# Patient Record
Sex: FEMALE | Race: WHITE | NOT HISPANIC OR LATINO | Employment: OTHER | URBAN - METROPOLITAN AREA
[De-identification: names, ages, dates, MRNs, and addresses within clinical notes are randomized per-mention and may not be internally consistent; named-entity substitution may affect disease eponyms.]

---

## 2019-06-09 ENCOUNTER — OFFICE VISIT (OUTPATIENT)
Dept: URGENT CARE | Facility: CLINIC | Age: 69
End: 2019-06-09
Payer: COMMERCIAL

## 2019-06-09 VITALS
SYSTOLIC BLOOD PRESSURE: 130 MMHG | RESPIRATION RATE: 16 BRPM | OXYGEN SATURATION: 100 % | DIASTOLIC BLOOD PRESSURE: 64 MMHG | TEMPERATURE: 99.3 F | HEART RATE: 84 BPM | BODY MASS INDEX: 31.18 KG/M2 | HEIGHT: 66 IN | WEIGHT: 194 LBS

## 2019-06-09 DIAGNOSIS — J06.9 VIRAL URI WITH COUGH: Primary | ICD-10-CM

## 2019-06-09 PROCEDURE — 99203 OFFICE O/P NEW LOW 30 MIN: CPT | Performed by: NURSE PRACTITIONER

## 2019-06-09 RX ORDER — SIMVASTATIN 10 MG
10 TABLET ORAL DAILY
Refills: 0 | COMMUNITY
Start: 2019-05-11 | End: 2021-08-24

## 2019-06-09 RX ORDER — METFORMIN HYDROCHLORIDE 500 MG/1
1000 TABLET, EXTENDED RELEASE ORAL 2 TIMES DAILY
Refills: 0 | COMMUNITY
Start: 2019-06-02

## 2021-04-13 ENCOUNTER — EVALUATION (OUTPATIENT)
Dept: PHYSICAL THERAPY | Facility: CLINIC | Age: 71
End: 2021-04-13
Payer: MEDICARE

## 2021-04-13 ENCOUNTER — TRANSCRIBE ORDERS (OUTPATIENT)
Dept: PHYSICAL THERAPY | Facility: CLINIC | Age: 71
End: 2021-04-13

## 2021-04-13 DIAGNOSIS — M25.552 LEFT HIP PAIN: ICD-10-CM

## 2021-04-13 DIAGNOSIS — M54.16 LUMBAR RADICULOPATHY: Primary | ICD-10-CM

## 2021-04-13 PROCEDURE — 97162 PT EVAL MOD COMPLEX 30 MIN: CPT | Performed by: PHYSICAL THERAPIST

## 2021-04-13 PROCEDURE — 97110 THERAPEUTIC EXERCISES: CPT | Performed by: PHYSICAL THERAPIST

## 2021-04-13 NOTE — LETTER
2021    Ifeoma Patel MD  [address] [place] [id number]    Patient: Trina Winters   YOB: 1950   Date of Visit: 2021     Encounter Diagnosis     ICD-10-CM    1  Lumbar radiculopathy  M54 16    2  Left hip pain  M25 552        Dear Dr Nelly Saez:    Thank you for your recent referral of Trina Winters  Please review the attached evaluation summary from [facility]'s recent visit  Please verify that you agree with the plan of care by signing the attached order  If you have any questions or concerns, please do not hesitate to call  I sincerely appreciate the opportunity to share in the care of one of your patients and hope to have another opportunity to work with you in the near future  Sincerely,    Celestine Lora, PT      Referring Provider:      I certify that I have read the below Plan of Care and certify the need for these services furnished under this plan of treatment while under my care  Ifeoma Patel MD  [address] [place] [id number]  Via Fax: [phone]          PT Evaluation     Today's date: 2021  Patient name: Trina Winters  : 1950  MRN: [id number]  Referring provider: Roz Arroyo MD  Dx:   Encounter Diagnosis     ICD-10-CM    1  Lumbar radiculopathy  M54 16    2  Left hip pain  M25 552                   Assessment  Assessment details: Trina Winters is a 72 y o  female who presents with pain, decreased strength, decreased ROM, decreased joint mobility and ambulatory dysfunction  Due to these impairments, patient has difficulty performing ADL's, prolonged standing, prolonged sitting, functional mobility  Patient's clinical presentation is consistent with early signs of left hip OA combined with lumbar stenosis  Patient has been educated in postural education, home exercise program and plan of care   Patient would benefit from skilled physical therapy services to address their aforementioned functional limitations and progress towards prior level of function and independence with home exercise program         Impairments: abnormal gait, abnormal or restricted ROM, activity intolerance, impaired physical strength, lacks appropriate home exercise program, pain with function, poor posture  and poor body mechanics  Understanding of Dx/Px/POC: excellent  Goals  Short term goals to be accomplished in 2-4weeks:  STG 1: Pt will demo I with HEP to maximize progress between therapy sessions  STG 2: Pt will demo 1/2 MMT grade core/hip strength to improve lumbar stability with functional challenges  STG 3: Pt will reports pain dec freq and intensity 50%    Long term goals to be accomplished in 4-8 weeks:  LTG 1: Pt will be able to negotiate stairs reciprocally >75% of time  LTG 2: Pt will be able to return to walking activity for >1hour pain free as per PLOF  LTG 3: Pt will demo improved SLS balance to >20 sec B  Plan  Plan details: HEP development, stretching, strengthening, A/AA/PROM, joint mobilizations, posture education, body mechanics training for bending and lifting, STM/MI as needed to reduce muscle tension, balance and proprioceptive training, muscle reeducation, PLOC discussed and agreed upon with patient  Patient would benefit from: skilled physical therapy  Planned modality interventions: cryotherapy and thermotherapy: hydrocollator packs  Planned therapy interventions: manual therapy, neuromuscular re-education, self care, therapeutic activities, therapeutic exercise, home exercise program, body mechanics training, patient education, postural training, strengthening and stretching  Frequency: 2x week  Duration in weeks: 6  Treatment plan discussed with: patient        Subjective Evaluation    History of Present Illness  Mechanism of injury: Miesha Champagne reports an onset of left low back and lateral hip pain that began in June 2020    She received an injection at that time by her physiatrist with some relief of pain  She has also been exercises and staying active since then but continues with symptoms in the region  She recently returned to her physiatrist who has referred her to Chuckie Arriaga prior to further injections  Marybel Moraes reports left low back, lateral hip and anterior groin pain that is intermittent  She states she feels stiffness and pain that increase with inactivity and improve with movement  She states pain and stiffness can cause her leg to feel weak which impacts her ability to negotiate stairs  Marybel Moraes reports "Limited a lot" with : moderate activities (moving a table, pushing a vacuum ), climbing several flights of stairs, "limiteed a little" with: walking several blocks, "moderate difficulty" with: performing heavy activities around home, usual hobbies, recreational activities performing usual work, housework  Pain  Current pain ratin  At best pain ratin  At worst pain ratin  Quality: dull ache (stiffness)    Social Support  Steps to enter house: yes  Stairs in house: yes   Lives in: multiple-level home  Lives with: significant other    Employment status: not working  Exercise history: Daily exercise - walking and pilates  Treatments  Previous treatment: injection treatment and physical therapy  Patient Goals  Patient goals for therapy: increased strength, return to sport/leisure activities, independence with ADLs/IADLs, improved balance and decreased pain          Objective     Postural Observations  Seated posture: fair  Standing posture: fair        Palpation   Left   Hypertonic in the quadratus lumborum  Tenderness of the gluteus marla, gluteus medius and quadratus lumborum  Tenderness     Left Hip   Tenderness in the greater trochanter       Active Range of Motion     Lumbar   Flexion:  WFL  Extension:  Restriction level: moderate  Left lateral flexion:  Restriction level: moderate  Right lateral flexion:  Restriction level: minimal  Left Hip   Abduction: 16 degrees with pain  External rotation (90/90): 32 degrees   Internal rotation (90/90): 23 degrees with pain    Right Hip   Abduction: 27 degrees   External rotation (90/90): 22 degrees   Internal rotation (90/90): 29 degrees     Strength/Myotome Testing     Left Hip   Planes of Motion   Flexion: 4  Extension: 3+  Abduction: 3+  External rotation: 4- (Pain)  Internal rotation: 5    Right Hip   Planes of Motion   Flexion: 4-  Extension: 3+  Abduction: 3+  External rotation: 4+  Internal rotation: 4+    Left Knee   Flexion: 4+  Extension: 5    Right Knee   Flexion: 4+  Extension: 5    Left Ankle/Foot   Dorsiflexion: 5    Right Ankle/Foot   Dorsiflexion: 5    Tests     Lumbar     Left   Positive crossed SLR  Negative passive SLR  Left Pelvic Girdle/Sacrum   Negative: active SLR test      Right Pelvic Girdle/Sacrum   Negative: active SLR test      Left Hip   Positive FADIR  Negative KATARZYNA    SLR: Positive  Knee extension: 42  Right Hip   SLR: Negative  Knee extension: 58  Ambulation     Ambulation: Stairs   Ascend stairs: independent  Pattern: non-reciprocal  Railings: one rail  Descend stairs: independent  Pattern: non-reciprocal  Railings: one rail    Observational Gait   Decreased walking speed and stride length  Additional Observational Gait Details  Patient ambulates with increased left hip external rotation and stiff lumbar spine      Functional Assessment        Single Leg Stance   Left: 10 seconds  Right: 5 seconds      Flowsheet Rows      Most Recent Value   PT/OT G-Codes   Current Score  52   Projected Score  63             Precautions: None  [facility]    Manuals 4/13                                       Neuro Re-Ed        Hook PPT 10x5s                                                       Ther Ex        Hook LS rot 15x       Hook knee fallouts 15x                                                       Ther Activity Gait Training                        Modalities

## 2021-04-13 NOTE — PROGRESS NOTES
PT Evaluation     Today's date: 2021  Patient name: Mahsa Villarreal  : 1950  MRN: [id number]  Referring provider: Myesha Gaviria MD  Dx:   Encounter Diagnosis     ICD-10-CM    1  Lumbar radiculopathy  M54 16    2  Left hip pain  M25 552                   Assessment  Assessment details: Mahsa Villarreal is a 72 y o  female who presents with pain, decreased strength, decreased ROM, decreased joint mobility and ambulatory dysfunction  Due to these impairments, patient has difficulty performing ADL's, prolonged standing, prolonged sitting, functional mobility  Patient's clinical presentation is consistent with early signs of left hip OA combined with lumbar stenosis  Patient has been educated in postural education, home exercise program and plan of care  Patient would benefit from skilled physical therapy services to address their aforementioned functional limitations and progress towards prior level of function and independence with home exercise program         Impairments: abnormal gait, abnormal or restricted ROM, activity intolerance, impaired physical strength, lacks appropriate home exercise program, pain with function, poor posture  and poor body mechanics  Understanding of Dx/Px/POC: excellent  Goals  Short term goals to be accomplished in 2-4weeks:  STG 1: Pt will demo I with HEP to maximize progress between therapy sessions  STG 2: Pt will demo 1/2 MMT grade core/hip strength to improve lumbar stability with functional challenges  STG 3: Pt will reports pain dec freq and intensity 50%    Long term goals to be accomplished in 4-8 weeks:  LTG 1: Pt will be able to negotiate stairs reciprocally >75% of time  LTG 2: Pt will be able to return to walking activity for >1hour pain free as per PLOF  LTG 3: Pt will demo improved SLS balance to >20 sec B      Plan  Plan details: HEP development, stretching, strengthening, A/AA/PROM, joint mobilizations, posture education, body mechanics training for bending and lifting, STM/MI as needed to reduce muscle tension, balance and proprioceptive training, muscle reeducation, PLOC discussed and agreed upon with patient  Patient would benefit from: skilled physical therapy  Planned modality interventions: cryotherapy and thermotherapy: hydrocollator packs  Planned therapy interventions: manual therapy, neuromuscular re-education, self care, therapeutic activities, therapeutic exercise, home exercise program, body mechanics training, patient education, postural training, strengthening and stretching  Frequency: 2x week  Duration in weeks: 6  Treatment plan discussed with: patient        Subjective Evaluation    History of Present Illness  Mechanism of injury: Mahsa Villarreal reports an onset of left low back and lateral hip pain that began in 2020  She received an injection at that time by her physiatrist with some relief of pain  She has also been exercises and staying active since then but continues with symptoms in the region  She recently returned to her physiatrist who has referred her to Chuckie Arriaga prior to further injections  Mahsa Villarreal reports left low back, lateral hip and anterior groin pain that is intermittent  She states she feels stiffness and pain that increase with inactivity and improve with movement  She states pain and stiffness can cause her leg to feel weak which impacts her ability to negotiate stairs  Mahsa Villarreal reports "Limited a lot" with : moderate activities (moving a table, pushing a vacuum ), climbing several flights of stairs, "limiteed a little" with: walking several blocks, "moderate difficulty" with: performing heavy activities around home, usual hobbies, recreational activities performing usual work, housework    Pain  Current pain ratin  At best pain ratin  At worst pain ratin  Quality: dull ache (stiffness)    Social Support  Steps to enter house: yes  Stairs in house: yes   Lives in: multiple-level home  Lives with: significant other    Employment status: not working  Exercise history: Daily exercise - walking and pilates  Treatments  Previous treatment: injection treatment and physical therapy  Patient Goals  Patient goals for therapy: increased strength, return to sport/leisure activities, independence with ADLs/IADLs, improved balance and decreased pain          Objective     Postural Observations  Seated posture: fair  Standing posture: fair        Palpation   Left   Hypertonic in the quadratus lumborum  Tenderness of the gluteus marla, gluteus medius and quadratus lumborum  Tenderness     Left Hip   Tenderness in the greater trochanter  Active Range of Motion     Lumbar   Flexion:  WFL  Extension:  Restriction level: moderate  Left lateral flexion:  Restriction level: moderate  Right lateral flexion:  Restriction level: minimal  Left Hip   Abduction: 16 degrees with pain  External rotation (90/90): 32 degrees   Internal rotation (90/90): 23 degrees with pain    Right Hip   Abduction: 27 degrees   External rotation (90/90): 22 degrees   Internal rotation (90/90): 29 degrees     Strength/Myotome Testing     Left Hip   Planes of Motion   Flexion: 4  Extension: 3+  Abduction: 3+  External rotation: 4- (Pain)  Internal rotation: 5    Right Hip   Planes of Motion   Flexion: 4-  Extension: 3+  Abduction: 3+  External rotation: 4+  Internal rotation: 4+    Left Knee   Flexion: 4+  Extension: 5    Right Knee   Flexion: 4+  Extension: 5    Left Ankle/Foot   Dorsiflexion: 5    Right Ankle/Foot   Dorsiflexion: 5    Tests     Lumbar     Left   Positive crossed SLR  Negative passive SLR  Left Pelvic Girdle/Sacrum   Negative: active SLR test      Right Pelvic Girdle/Sacrum   Negative: active SLR test      Left Hip   Positive FADIR  Negative KATARZYNA    SLR: Positive  Knee extension: 42  Right Hip   SLR: Negative  Knee extension: 58       Ambulation     Ambulation: Stairs   Ascend stairs: independent  Pattern: non-reciprocal  Railings: one rail  Descend stairs: independent  Pattern: non-reciprocal  Railings: one rail    Observational Gait   Decreased walking speed and stride length  Additional Observational Gait Details  Patient ambulates with increased left hip external rotation and stiff lumbar spine      Functional Assessment        Single Leg Stance   Left: 10 seconds  Right: 5 seconds      Flowsheet Rows      Most Recent Value   PT/OT G-Codes   Current Score  52   Projected Score  63             Precautions: None  HEP initiated as per [facility]    Manuals 4/13                                       Neuro Re-Ed        Hook PPT 10x5s                                                       Ther Ex        Hook LS rot 15x       Hook knee fallouts 15x                                                       Ther Activity                        Gait Training                        Modalities

## 2021-04-16 ENCOUNTER — OFFICE VISIT (OUTPATIENT)
Dept: PHYSICAL THERAPY | Facility: CLINIC | Age: 71
End: 2021-04-16
Payer: MEDICARE

## 2021-04-16 DIAGNOSIS — M25.552 LEFT HIP PAIN: ICD-10-CM

## 2021-04-16 DIAGNOSIS — M54.16 LUMBAR RADICULOPATHY: Primary | ICD-10-CM

## 2021-04-16 PROCEDURE — 97110 THERAPEUTIC EXERCISES: CPT | Performed by: PHYSICAL THERAPIST

## 2021-04-16 PROCEDURE — 97112 NEUROMUSCULAR REEDUCATION: CPT | Performed by: PHYSICAL THERAPIST

## 2021-04-16 NOTE — PROGRESS NOTES
Daily Note     Today's date: 2021  Patient name: Shaina Prasad  : 1950  MRN: [id number]  Referring provider: Satya Hough MD  Dx:   Encounter Diagnosis     ICD-10-CM    1  Lumbar radiculopathy  M54 16    2  Left hip pain  M25 552                   Subjective: Shaina Prasad states she was experiencing some pain radiating down her left anterior thigh last night after a busy day  She states when she wakes up she feels better  Objective: See treatment diary below      Assessment: Tolerated treatment well  Patient demonstrated fatigue post treatment with severe glut weakness and signs of lateral hip soft tissue irritability  Plan: Continue per plan of care        Precautions: None  HEP initiated as per [facility]: [id number] progressed HEP on     Manuals                                       Neuro Re-Ed        Hook PPT 10x5s 2x10 5s      Hook abd stab SLR  10x B      Bridge with PPT  2x10 5s                                      Ther Ex        Hook LS rot 15x 2x10       Hook knee fallouts 15x 2x10      Rec bike  L2 6'      SL hip abd  10x B      Seated hip IR/ER  10x B                               Ther Activity                        Gait Training                        Modalities

## 2021-04-21 ENCOUNTER — OFFICE VISIT (OUTPATIENT)
Dept: PHYSICAL THERAPY | Facility: CLINIC | Age: 71
End: 2021-04-21
Payer: MEDICARE

## 2021-04-21 DIAGNOSIS — M54.16 LUMBAR RADICULOPATHY: Primary | ICD-10-CM

## 2021-04-21 DIAGNOSIS — M25.552 LEFT HIP PAIN: ICD-10-CM

## 2021-04-21 PROCEDURE — 97110 THERAPEUTIC EXERCISES: CPT | Performed by: PHYSICAL THERAPIST

## 2021-04-21 PROCEDURE — 97112 NEUROMUSCULAR REEDUCATION: CPT | Performed by: PHYSICAL THERAPIST

## 2021-04-21 NOTE — PROGRESS NOTES
Daily Note     Today's date: 2021  Patient name: Anabela Piper  : 1950  MRN: [id number]  Referring provider: Narcisa Holloway MD  Dx:   Encounter Diagnosis     ICD-10-CM    1  Lumbar radiculopathy  M54 16    2  Left hip pain  M25 552                   Subjective: Anabela Piper reports she went to [employer] this week and modified her routine to be able to complete it  She continues with intermittent left lateral hip pain  Objective: See treatment diary below      Assessment: Tolerated treatment well  Patient demonstrated fatigue post treatment and needs cues for correct performance of all exercise and activation of lower abdominals  Plan: Continue per plan of care        Precautions: None  HEP initiated as per [facility]: [id number] progressed HEP on     Manuals                                      Neuro Re-Ed        Hook PPT 10x5s 2x10 5s 2x10 5s     Hook abd stab SLR  10x B      Bridge with PPT  2x10 5s      QPD PPT   20x5s     Biodex DL balance   2x1 min static  2x1 min L10     Sit to stand   15x             Ther Ex        Hook LS rot 15x 2x10  2x10     Hook knee fallouts 15x 2x10      Rec bike  L2 6'      SL hip abd  10x B 20x B     Seated hip IR/ER  10x B                               Ther Activity                        Gait Training                        Modalities

## 2021-04-23 ENCOUNTER — OFFICE VISIT (OUTPATIENT)
Dept: PHYSICAL THERAPY | Facility: CLINIC | Age: 71
End: 2021-04-23
Payer: MEDICARE

## 2021-04-23 DIAGNOSIS — M25.552 LEFT HIP PAIN: ICD-10-CM

## 2021-04-23 DIAGNOSIS — M54.16 LUMBAR RADICULOPATHY: Primary | ICD-10-CM

## 2021-04-23 PROCEDURE — 97110 THERAPEUTIC EXERCISES: CPT | Performed by: PHYSICAL THERAPIST

## 2021-04-23 PROCEDURE — 97112 NEUROMUSCULAR REEDUCATION: CPT | Performed by: PHYSICAL THERAPIST

## 2021-04-23 NOTE — PROGRESS NOTES
Daily Note     Today's date: 2021  Patient name: Chris Urban  : 1950  MRN: [id number]  Referring provider: Amy Johnson MD  Dx:   Encounter Diagnosis     ICD-10-CM    1  Lumbar radiculopathy  M54 16    2  Left hip pain  M25 552                   Subjective: Chris Urban states she hasn't done as much of her HEP as her grandson is in hospital   Today she has soreness along lateral hip and anterior thigh  Objective: See treatment diary below      Assessment: Tolerated treatment well  Patient demonstrated fatigue post treatment and continued left ITB/glut soreness and tightness  She requires tactile cues to achieve SLS with proper patello femoral alignment and recruitment of glut med  Plan: Continue per plan of care        Precautions: None  HEP initiated as per [facility]: [id number] progressed HEP on     Manuals                                     Neuro Re-Ed        Hook PPT 10x5s 2x10 5s 2x10 5s     Hook abd stab SLR  10x B      Bridge with PPT  2x10 5s  Bridge with hip abd 2x10    QPD PPT   20x5s     Biodex DL balance   2x1 min static  2x1 min L10     Sit to stand   15x      ML    15x5s with counter support    Ther Ex        Hook LS rot 15x 2x10  2x10 2x10    Hook knee fallouts 15x 2x10  2x10    Rec bike  L2 6'      SL hip abd  10x B 20x B     Seated hip IR/ER  10x B   2x10 B    SL clamshell     3x10 B    Hook KATARZYNA str    3x30s B    Hook ITB str    3x30s B    Ther Activity                        Gait Training                        Modalities

## 2021-04-28 ENCOUNTER — OFFICE VISIT (OUTPATIENT)
Dept: PHYSICAL THERAPY | Facility: CLINIC | Age: 71
End: 2021-04-28
Payer: MEDICARE

## 2021-04-28 DIAGNOSIS — M25.552 LEFT HIP PAIN: ICD-10-CM

## 2021-04-28 DIAGNOSIS — M54.16 LUMBAR RADICULOPATHY: Primary | ICD-10-CM

## 2021-04-28 PROCEDURE — 97110 THERAPEUTIC EXERCISES: CPT | Performed by: PHYSICAL THERAPIST

## 2021-04-28 PROCEDURE — 97112 NEUROMUSCULAR REEDUCATION: CPT | Performed by: PHYSICAL THERAPIST

## 2021-04-28 NOTE — PROGRESS NOTES
Daily Note     Today's date: 2021  Patient name: Shaina Prasad  : 1950  MRN: [id number]  Referring provider: Satya Hough MD  Dx:   Encounter Diagnosis     ICD-10-CM    1  Lumbar radiculopathy  M54 16    2  Left hip pain  M25 552                   Subjective: Shaina Prasad reports soreness in hip and thigh after piliates yesterday but no pain  Objective: See treatment diary below      Assessment: Tolerated treatment well  Patient demonstrated fatigue post treatment and would benefit from continued PT to improve glut med strength and activation in standing to avoid trendelenburg hip drop in SLS  Plan: Continue per plan of care        Precautions: None  HEP initiated as per [facility]: [id number] progressed HEP on     Manuals                                    Neuro Re-Ed        Hook PPT 10x5s 2x10 5s 2x10 5s     Hook abd stab SLR  10x B      Bridge with PPT  2x10 5s  Bridge with hip abd 2x10    QPD PPT   20x5s     Biodex DL balance   2x1 min static  2x1 min L10     Sit to stand   15x      ML    15x5s with counter support    Side step     [id number]   Stand hip hike     10x5s B   SLS hip abd     15x5s   Ther Ex        Hook LS rot 15x 2x10  2x10 2x10    Hook knee fallouts 15x 2x10  2x10    Rec bike  L2 6'   L2 6'   SL hip abd  10x B 20x B     Seated hip IR/ER  10x B   2x10 B    SL clamshell     3x10 B    Hook KATARZYNA str    3x30s B    Hook ITB str    3x30s B    Seated Ball roll out - L/M/R     10x5s ea   Seated hip flex/ER (cross Leg)     2x10 B                                   Modalities

## 2021-04-30 ENCOUNTER — OFFICE VISIT (OUTPATIENT)
Dept: PHYSICAL THERAPY | Facility: CLINIC | Age: 71
End: 2021-04-30
Payer: MEDICARE

## 2021-04-30 DIAGNOSIS — M54.16 LUMBAR RADICULOPATHY: Primary | ICD-10-CM

## 2021-04-30 DIAGNOSIS — M25.552 LEFT HIP PAIN: ICD-10-CM

## 2021-04-30 PROCEDURE — 97110 THERAPEUTIC EXERCISES: CPT | Performed by: PHYSICAL THERAPIST

## 2021-04-30 PROCEDURE — 97112 NEUROMUSCULAR REEDUCATION: CPT | Performed by: PHYSICAL THERAPIST

## 2021-04-30 NOTE — PROGRESS NOTES
Daily Note     Today's date: 2021  Patient name: Kendlel Holliday  : 1950  MRN: [id number]  Referring provider: Ashish Burroughs MD  Dx:   Encounter Diagnosis     ICD-10-CM    1  Lumbar radiculopathy  M54 16    2  Left hip pain  M25 552                   Subjective: Kendell Holliday states her lateral hip was sore the day after PT but not in pain  Objective: See treatment diary below      Assessment: Tolerated treatment well  Patient demonstrated fatigue post treatment especially in glute med  She requires tactile and verbal cues for forward hip hinge with step ups and SLS activity  Plan: Continue per plan of care        Precautions: None  HEP initiated as per medbridge: [id number] progressed HEP on     Manuals                                    Neuro Re-Ed        Hook PPT   2x10 5s     Hook abd stab SLR        Bridge with PPT Bridge with leg ext  3x5 B   Bridge with hip abd 2x10    QPD PPT   20x5s     Biodex DL balance   2x1 min static  2x1 min L10     Sit to stand 2x10  15x      ML    15x5s with counter support    Side step     [id number]   Stand hip hike     10x5s B   SLS hip abd 2x10 B    15x5s   Fwd step up 15x B 8"       Ther Ex        Hook LS rot 15x  Ft up 2x10  2x10 2x10    Hook knee fallouts    2x10    Rec bike     L2 6'   SL hip abd   20x B     Seated hip IR/ER    2x10 B    SL clamshell     3x10 B    Hook KATARZYNA str Seated 2x30s   3x30s B    Hook ITB str 2x30s   3x30s B    Seated Ball roll out - L/M/R     10x5s ea   Seated hip flex/ER (cross Leg)     2x10 B   Seated piriformis str 2x30s

## 2021-05-05 ENCOUNTER — OFFICE VISIT (OUTPATIENT)
Dept: PHYSICAL THERAPY | Facility: CLINIC | Age: 71
End: 2021-05-05
Payer: MEDICARE

## 2021-05-05 DIAGNOSIS — M54.16 LUMBAR RADICULOPATHY: ICD-10-CM

## 2021-05-05 DIAGNOSIS — M25.552 LEFT HIP PAIN: Primary | ICD-10-CM

## 2021-05-05 PROCEDURE — 97110 THERAPEUTIC EXERCISES: CPT | Performed by: PHYSICAL THERAPIST

## 2021-05-05 PROCEDURE — 97112 NEUROMUSCULAR REEDUCATION: CPT | Performed by: PHYSICAL THERAPIST

## 2021-05-05 NOTE — PROGRESS NOTES
Daily Note     Today's date: 2021  Patient name: Katherine Luz  : 1950  MRN: [id number]  Referring provider: Dominga Fletcher MD  Dx:   Encounter Diagnosis     ICD-10-CM    1  Left hip pain  M25 552    2  Lumbar radiculopathy  M54 16                   Subjective: Katherine Luz states she is having little to no pain  She has had muscle soreness after PT and pilates but decreasing pain  She reports minimal pain along TFL today  Objective: See treatment diary below      Assessment: Tolerated treatment well  Patient demonstrated fatigue post treatment in glutes lateral hip musculature  She also demos limited hip IR ROM on right  Plan: Continue per plan of care        Precautions: None  HEP initiated as per oan[facility]: [id number] progressed HEP on     Manuals    PROM hip IR in prone  15x5s                              Neuro Re-Ed        Hook PPT - hip flex off table  2x15      Hook abd stab SLR        Bridge with PPT Bridge with leg ext  3x5 B   Bridge with hip abd 2x10    Stand on/off horse  10x/5x B      Biodex DL balance        Sit to stand 2x10        ML    15x5s with counter support    Side step     [id number]   Stand hip hike     10x5s B   SLS hip abd 2x10 B    15x5s   Fwd step up 15x B 8" 10x B 6"      Lat step up  10x B 6"      Ther Ex        Hook LS rot 15x  Ft up 2x10   2x10    Hook knee fallouts    2x10    Rec bike     L2 6'   SL hip abd        Seated hip IR/ER    2x10 B    SL clamshell     3x10 B    Hook KATARZYNA str Seated 2x30s   3x30s B    Hook ITB str 2x30s   3x30s B    Seated Ball roll out - L/M/R     10x5s ea   Seated hip flex/ER (cross Leg)     2x10 B   Seated piriformis str 2x30s       Sup quad str w/SOS  3x30s B      Prone hip IR/ER  2x15 2lb

## 2021-05-07 ENCOUNTER — OFFICE VISIT (OUTPATIENT)
Dept: PHYSICAL THERAPY | Facility: CLINIC | Age: 71
End: 2021-05-07
Payer: MEDICARE

## 2021-05-07 DIAGNOSIS — M54.16 LUMBAR RADICULOPATHY: Primary | ICD-10-CM

## 2021-05-07 DIAGNOSIS — M25.552 LEFT HIP PAIN: ICD-10-CM

## 2021-05-07 PROCEDURE — 97110 THERAPEUTIC EXERCISES: CPT | Performed by: PHYSICAL THERAPIST

## 2021-05-07 PROCEDURE — 97112 NEUROMUSCULAR REEDUCATION: CPT | Performed by: PHYSICAL THERAPIST

## 2021-05-07 NOTE — PROGRESS NOTES
Daily Note     Today's date: 2021  Patient name: Chris Urban  : 1950  MRN: [id number]  Referring provider: Amy Johnson MD  Dx:   Encounter Diagnosis     ICD-10-CM    1  Lumbar radiculopathy  M54 16    2  Left hip pain  M25 552                   Subjective: Chris Urban states she feels like she is walking better and feels stronger  Objective: See treatment diary below      Assessment: Tolerated treatment well  Patient demonstrated fatigue post treatment and limited proprioceptive responses during balance activity  Plan: Continue per plan of care        Precautions: None  HEP initiated as per oanh: [id number] progressed HEP on     Manuals    PROM hip IR in prone  15x5s                              Neuro Re-Ed        Hook PPT - hip flex off table  2x15      Hook abd stab SLR        Bridge with PPT Bridge with leg ext  3x5 B  Bridge with band abd  3x10 blue     Stand on/off horse  10x/5x B      Tandem stance EC   2x30s B     Sit to stand 2x10       Tandem walk fwd/retro   5x20ft     Side step     [id number]   Stand hip hike     10x5s B   SLS hip abd 2x10 B    15x5s   Fwd step up 15x B 8" 10x B 6" 15x B BOSU     Lat step up  10x B 6"      Ther Ex        Hook LS rot 15x  Ft up 2x10  15x  Ft up 2x10     Hook knee fallouts        Rec bike   L3 6'  L2 6'   SL hip abd        SL hip IR/ER   2x10 B     SL clamshell         Hook KATARZYNA str Seated 2x30s       Hook ITB str 2x30s       Seated Ball roll out - L/M/R     10x5s ea   Seated hip flex/ER (cross Leg)     2x10 B   Seated piriformis str 2x30s       Sup quad str w/SOS  3x30s B      Prone hip IR/ER  2x15 2lb

## 2021-05-12 ENCOUNTER — OFFICE VISIT (OUTPATIENT)
Dept: PHYSICAL THERAPY | Facility: CLINIC | Age: 71
End: 2021-05-12
Payer: MEDICARE

## 2021-05-12 DIAGNOSIS — M54.16 LUMBAR RADICULOPATHY: ICD-10-CM

## 2021-05-12 DIAGNOSIS — M25.552 LEFT HIP PAIN: Primary | ICD-10-CM

## 2021-05-12 PROCEDURE — 97110 THERAPEUTIC EXERCISES: CPT | Performed by: PHYSICAL THERAPIST

## 2021-05-12 PROCEDURE — 97112 NEUROMUSCULAR REEDUCATION: CPT | Performed by: PHYSICAL THERAPIST

## 2021-05-12 NOTE — PROGRESS NOTES
Daily Note     Today's date: 2021  Patient name: Jovita Mason  : 1950  MRN: [id number]  Referring provider: Jey Logan MD  Dx:   Encounter Diagnosis     ICD-10-CM    1  Left hip pain  M25 552    2  Lumbar radiculopathy  M54 16                   Subjective: Jovita Mason states she had incresed left lateral hip and thigh pain over weekend since last session  Objective: See treatment diary below      Assessment: Tolerated treatment well  Patient demonstrated fatigue post treatment and irritation of left lateral hip and ITB  She requires tactile, visual and verbal cues to improve weight shift to SLS to activate glut med in WB, negotiation of stairs  Plan: Continue per plan of care        Precautions: None  HEP initiated as per medbridge: [id number] progressed HEP on     Manuals     PROM hip IR in prone  15x5s                              Neuro Re-Ed        Hook PPT - hip flex off table  2x15      Hook abd stab SLR        Bridge with PPT Bridge with leg ext  3x5 B  Bridge with band abd  3x10 blue     Stand on/off horse  10x/5x B      Tandem stance EC   2x30s B     Sit to stand 2x10       Tandem walk fwd/retro   5x20ft 5x20ft    Side step    5x20ft    Ice skaters ML    15x5s    SLS hip abd 2x10 B       Fwd step up 15x B 8" 10x B 6" 15x B BOSU 2x10 6" B    Lat step up  10x B 6"      Ther Ex        Hook LS rot 15x  Ft up 2x10  15x  Ft up 2x10 15x    Hook knee fallouts        Rec bike   L3 6'     SL hip abd    2x10 B    SL hip IR/ER   2x10 B     SL clamshell         Hook KATARZYNA str Seated 2x30s   2x30s    Hook ITB str 2x30s   3x30s    Seated Ball roll out - L/M/R        Seated hip flex/ER (cross Leg)        Seated piriformis str 2x30s       Sup quad str w/SOS  3x30s B      Prone hip IR/ER  2x15 2lb

## 2021-05-14 ENCOUNTER — EVALUATION (OUTPATIENT)
Dept: PHYSICAL THERAPY | Facility: CLINIC | Age: 71
End: 2021-05-14
Payer: MEDICARE

## 2021-05-14 DIAGNOSIS — M54.16 LUMBAR RADICULOPATHY: ICD-10-CM

## 2021-05-14 DIAGNOSIS — M25.552 LEFT HIP PAIN: Primary | ICD-10-CM

## 2021-05-14 PROCEDURE — 97110 THERAPEUTIC EXERCISES: CPT | Performed by: PHYSICAL THERAPIST

## 2021-05-14 NOTE — PROGRESS NOTES
PT Re-Evaluation     Today's date: 2021  Patient name: Gurwinder Massey  : 1950  MRN: [id number]  Referring provider: Black Odonnell MD  Dx:   Encounter Diagnosis     ICD-10-CM    1  Left hip pain  M25 552    2  Lumbar radiculopathy  M54 16                   Assessment  Assessment details: Gurwinder Massey has been seen for 10 visits of physical therapy  Gurwinder Massey is demonstrating excellent progress with improving hip and spine strength, ROM, decreasing pain levels, and overall functional mobility levels  She continues with intermittent left low back pain, lateral hip soreness and anterior thigh pain that can radiate down thigh  These symptoms and provocation tests listed below indicate possible mild hip OA related to pain  Patient will continue to benefit from skilled physical therapy to continually address the remaining strength, ROM, balance and proprioceptive deficits, limited walking endurance to be able to return to iADLs, recreational activities at [facility]  Impairments: abnormal gait, abnormal or restricted ROM, activity intolerance, impaired physical strength, lacks appropriate home exercise program, pain with function, poor posture  and poor body mechanics  Understanding of Dx/Px/POC: excellent  Goals  Short term goals to be accomplished in 2-4weeks: - Met  STG 1: Pt will demo I with HEP to maximize progress between therapy sessions  STG 2: Pt will demo 1/2 MMT grade core/hip strength to improve lumbar stability with functional challenges  STG 3: Pt will reports pain dec freq and intensity 50%    Long term goals to be accomplished in 4-8 weeks: - In Progress  LTG 1: Pt will be able to negotiate stairs reciprocally >75% of time  LTG 2: Pt will be able to return to walking activity for >1hour pain free as per PLOF  LTG 3: Pt will demo improved SLS balance to >20 sec B      Plan  Plan details: HEP development, stretching, strengthening, A/AA/PROM, joint mobilizations, posture education, body mechanics training for bending and lifting, STM/MI as needed to reduce muscle tension, balance and proprioceptive training, muscle reeducation, PLOC discussed and agreed upon with patient  Patient would benefit from: skilled physical therapy  Planned modality interventions: thermotherapy: hydrocollator packs and cryotherapy  Planned therapy interventions: manual therapy, neuromuscular re-education, self care, therapeutic activities, therapeutic exercise, home exercise program, body mechanics training, patient education, postural training, strengthening and stretching  Frequency: 2x week  Duration in weeks: 6  Treatment plan discussed with: patient        Subjective Evaluation    History of Present Illness  Mechanism of injury: 65% overall improvement  Right low back pain but less sharp - sitting prolonged, driving  She contninues with left lateral hip and anterior thigh pain after inacitvity and impoves quickly after beginning to move, prolonged walking (>1mile)  She reports its easier to bend now then prior to PT  Michelleeros Andrew reports "Limited a little" with: walking > 1 mile, moderate activies (like vacuuming, moving a table), lifting or carrying groceries, walking several blocks; "Moderate difficulty" with: performing heavy activities around home, performing usual work or housework  Pain  Current pain ratin  At best pain ratin  At worst pain rating: 3  Quality: dull ache (stiffness)    Social Support  Steps to enter house: yes  Stairs in house: yes   Lives in: multiple-level home  Lives with: significant other    Employment status: not working  Exercise history: Daily exercise - walking and pilates      Treatments  Previous treatment: injection treatment and physical therapy  Patient Goals  Patient goals for therapy: increased strength, return to sport/leisure activities, independence with ADLs/IADLs, improved balance and decreased pain          Objective     Postural Observations  Seated posture: fair  Standing posture: fair        Palpation   Left   Hypertonic in the quadratus lumborum  Tenderness of the gluteus marla, gluteus medius and quadratus lumborum  Tenderness     Left Hip   Tenderness in the greater trochanter  Active Range of Motion     Lumbar   Flexion:  WFL  Extension:  Restriction level: minimal  Left lateral flexion:  Restriction level: minimal  Right lateral flexion:  Restriction level: minimal  Left Hip   Abduction: 25 degrees with pain  External rotation (90/90): 36 degrees   Internal rotation (90/90): 23 degrees with pain    Right Hip   Abduction: 29 degrees   External rotation (90/90): 23 degrees   Internal rotation (90/90): 35 degrees     Strength/Myotome Testing     Left Hip   Planes of Motion   Flexion: 4+  Extension: 3+  Abduction: 4-  External rotation: 5 (Pain)  Internal rotation: 4+    Right Hip   Planes of Motion   Flexion: 4+  Extension: 4  Abduction: 4-  External rotation: 5  Internal rotation: 4+    Left Knee   Flexion: 4+  Extension: 5    Right Knee   Flexion: 4+  Extension: 5    Left Ankle/Foot   Dorsiflexion: 5    Right Ankle/Foot   Dorsiflexion: 5    Tests     Lumbar     Left   Positive crossed SLR  Negative passive SLR  Left Pelvic Girdle/Sacrum   Negative: active SLR test      Right Pelvic Girdle/Sacrum   Negative: active SLR test      Left Hip   Positive NEVILLE COLÓN and sepideh  SLR: Positive  Knee extension: 47  Right Hip   SLR: Negative  Knee extension: 58  Ambulation     Ambulation: Stairs   Ascend stairs: independent  Pattern: reciprocal  Railings: one rail  Descend stairs: independent  Pattern: reciprocal  Railings: one rail    Observational Gait   Gait: within functional limits   Walking speed and stride length within functional limits       Functional Assessment        Single Leg Stance - Eyes Open   Left  Trial 1: 5 seconds  Trial 2: 4 seconds  Trial 3: 5 seconds  Average: 4 67 seconds     Right  Trial 1: 10 seconds  Trial 2: 8 seconds  Trial 3: 9 seconds  Average: 9 seconds       Flowsheet Rows      Most Recent Value   PT/OT G-Codes   Current Score  56   Projected Score  63                   Precautions: None  HEP initiated as per oanh: [id number] progressed HEP on 4/16    Manuals 4/30 5/5 5/7 5/12 5/14   PROM hip IR in prone  15x5s                              Neuro Re-Ed        Hook PPT - hip flex off table  2x15      SLS     5x10s B   Bridge with PPT Bridge with leg ext  3x5 B  Bridge with band abd  3x10 blue     Stand on/off horse  10x/5x B      Tandem stance EC   2x30s B     Sit to stand 2x10       Tandem walk fwd/retro   5x20ft 5x20ft    Side step    5x20ft    Ice skaters ML    15x5s    SLS hip abd 2x10 B       Fwd step up 15x B 8" 10x B 6" 15x B BOSU 2x10 6" B    Lat step up  10x B 6"      Ther Ex        Hook LS rot 15x  Ft up 2x10  15x  Ft up 2x10 15x 15x   Hook knee fallouts        Rec bike   L3 6'     SL hip abd    2x10 B 5x B   SL hip IR/ER   2x10 B     SL clamshell         Hook KATARZYNA str Seated 2x30s   2x30s    Hook ITB str 2x30s   3x30s    Seated Ball roll out - L/M/R        Seated hip flex/ER (cross Leg)        Seated piriformis str 2x30s       Sup quad str w/SOS  3x30s B      Prone hip IR/ER  2x15 2lb      Prone hip ext     5x B                              Time spent during todays treatment educating on plan to build independence towards a HEP, cause of pain related to hip pathology and lumbar region  Also continued postural education

## 2021-05-14 NOTE — LETTER
May 17, 2021    Sayra Hernandez MD  [address] [place] [id number]    Patient: Dulce Ibarra   YOB: 1950   Date of Visit: 2021     Encounter Diagnosis     ICD-10-CM    1  Left hip pain  M25 552    2  Lumbar radiculopathy  M54 16        Dear Dr Nemesio Sommer:    Thank you for your recent referral of Dulce Ibarra  Please review the attached evaluation summary from Sahara's recent visit  Please verify that you agree with the plan of care by signing the attached order  If you have any questions or concerns, please do not hesitate to call  I sincerely appreciate the opportunity to share in the care of one of your patients and hope to have another opportunity to work with you in the near future  Sincerely,    Jonathan Snider, PT      Referring Provider:      I certify that I have read the below Plan of Care and certify the need for these services furnished under this plan of treatment while under my care  Sayra Hernandez MD  [address] [place] [id number]  Via Fax: [phone]          PT Re-Evaluation     Today's date: 2021  Patient name: Dulce Ibarra  : 1950  MRN: [id number]  Referring provider: Barney Patiño MD  Dx:   Encounter Diagnosis     ICD-10-CM    1  Left hip pain  M25 552    2  Lumbar radiculopathy  M54 16                   Assessment  Assessment details: Dulce Ibarra has been seen for 10 visits of physical therapy  Dulce Ibarra is demonstrating excellent progress with improving hip and spine strength, ROM, decreasing pain levels, and overall functional mobility levels  She continues with intermittent left low back pain, lateral hip soreness and anterior thigh pain that can radiate down thigh  These symptoms and provocation tests listed below indicate possible mild hip OA related to pain    Patient will continue to benefit from skilled physical therapy to continually address the remaining strength, ROM, balance and proprioceptive deficits, limited walking endurance to be able to return to iADLs, recreational activities at [facility]  Impairments: abnormal gait, abnormal or restricted ROM, activity intolerance, impaired physical strength, lacks appropriate home exercise program, pain with function, poor posture  and poor body mechanics  Understanding of Dx/Px/POC: excellent  Goals  Short term goals to be accomplished in 2-4weeks: - Met  STG 1: Pt will demo I with HEP to maximize progress between therapy sessions  STG 2: Pt will demo 1/2 MMT grade core/hip strength to improve lumbar stability with functional challenges  STG 3: Pt will reports pain dec freq and intensity 50%    Long term goals to be accomplished in 4-8 weeks: - In Progress  LTG 1: Pt will be able to negotiate stairs reciprocally >75% of time  LTG 2: Pt will be able to return to walking activity for >1hour pain free as per PLOF  LTG 3: Pt will demo improved SLS balance to >20 sec B  Plan  Plan details: HEP development, stretching, strengthening, A/AA/PROM, joint mobilizations, posture education, body mechanics training for bending and lifting, STM/MI as needed to reduce muscle tension, balance and proprioceptive training, muscle reeducation, PLOC discussed and agreed upon with patient  Patient would benefit from: skilled physical therapy  Planned modality interventions: thermotherapy: hydrocollator packs and cryotherapy  Planned therapy interventions: manual therapy, neuromuscular re-education, self care, therapeutic activities, therapeutic exercise, home exercise program, body mechanics training, patient education, postural training, strengthening and stretching  Frequency: 2x week  Duration in weeks: 6  Treatment plan discussed with: patient        Subjective Evaluation    History of Present Illness  Mechanism of injury: 65% overall improvement  Right low back pain but less sharp - sitting prolonged, driving   She contninues with left lateral hip and anterior thigh pain after inacitvity and impoves quickly after beginning to move, prolonged walking (>1mile)  She reports its easier to bend now then prior to PT  Calli Chacon reports "Limited a little" with: walking > 1 mile, moderate activies (like vacuuming, moving a table), lifting or carrying groceries, walking several blocks; "Moderate difficulty" with: performing heavy activities around home, performing usual work or housework  Pain  Current pain ratin  At best pain ratin  At worst pain rating: 3  Quality: dull ache (stiffness)    Social Support  Steps to enter house: yes  Stairs in house: yes   Lives in: multiple-level home  Lives with: significant other    Employment status: not working  Exercise history: Daily exercise - walking and pilates  Treatments  Previous treatment: injection treatment and physical therapy  Patient Goals  Patient goals for therapy: increased strength, return to sport/leisure activities, independence with ADLs/IADLs, improved balance and decreased pain          Objective     Postural Observations  Seated posture: fair  Standing posture: fair        Palpation   Left   Hypertonic in the quadratus lumborum  Tenderness of the gluteus marla, gluteus medius and quadratus lumborum  Tenderness     Left Hip   Tenderness in the greater trochanter       Active Range of Motion     Lumbar   Flexion:  WFL  Extension:  Restriction level: minimal  Left lateral flexion:  Restriction level: minimal  Right lateral flexion:  Restriction level: minimal  Left Hip   Abduction: 25 degrees with pain  External rotation (90/90): 36 degrees   Internal rotation (90/90): 23 degrees with pain    Right Hip   Abduction: 29 degrees   External rotation (90/90): 23 degrees   Internal rotation (90/90): 35 degrees     Strength/Myotome Testing     Left Hip   Planes of Motion   Flexion: 4+  Extension: 3+  Abduction: 4-  External rotation: 5 (Pain)  Internal rotation: 4+    Right Hip Planes of Motion   Flexion: 4+  Extension: 4  Abduction: 4-  External rotation: 5  Internal rotation: 4+    Left Knee   Flexion: 4+  Extension: 5    Right Knee   Flexion: 4+  Extension: 5    Left Ankle/Foot   Dorsiflexion: 5    Right Ankle/Foot   Dorsiflexion: 5    Tests     Lumbar     Left   Positive crossed SLR  Negative passive SLR  Left Pelvic Girdle/Sacrum   Negative: active SLR test      Right Pelvic Girdle/Sacrum   Negative: active SLR test      Left Hip   Positive KATARZYNA, FADIR and scour  SLR: Positive  Knee extension: 47  Right Hip   SLR: Negative  Knee extension: 58  Ambulation     Ambulation: Stairs   Ascend stairs: independent  Pattern: reciprocal  Railings: one rail  Descend stairs: independent  Pattern: reciprocal  Railings: one rail    Observational Gait   Gait: within functional limits   Walking speed and stride length within functional limits       Functional Assessment        Single Leg Stance - Eyes Open   Left  Trial 1: 5 seconds  Trial 2: 4 seconds  Trial 3: 5 seconds  Average: 4 67 seconds     Right  Trial 1: 10 seconds  Trial 2: 8 seconds  Trial 3: 9 seconds  Average: 9 seconds       Flowsheet Rows      Most Recent Value   PT/OT G-Codes   Current Score  56   Projected Score  63                   Precautions: None  HEP initiated as per medbridge: [id number] progressed HEP on 4/16    Manuals 4/30 5/5 5/7 5/12 5/14   PROM hip IR in prone  15x5s                              Neuro Re-Ed        Hook PPT - hip flex off table  2x15      SLS     5x10s B   Bridge with PPT Bridge with leg ext  3x5 B  Bridge with band abd  3x10 blue     Stand on/off horse  10x/5x B      Tandem stance EC   2x30s B     Sit to stand 2x10       Tandem walk fwd/retro   5x20ft 5x20ft    Side step    5x20ft    Ice skaters ML    15x5s    SLS hip abd 2x10 B       Fwd step up 15x B 8" 10x B 6" 15x B BOSU 2x10 6" B    Lat step up  10x B 6"      Ther Ex        Hook LS rot 15x  Ft up 2x10  15x  Ft up 2x10 15x 15x   Hook knee fallouts        Rec bike   L3 6'     SL hip abd    2x10 B 5x B   SL hip IR/ER   2x10 B     SL clamshell         Hook KATARZYNA str Seated 2x30s   2x30s    Hook ITB str 2x30s   3x30s    Seated Ball roll out - L/M/R        Seated hip flex/ER (cross Leg)        Seated piriformis str 2x30s       Sup quad str w/SOS  3x30s B      Prone hip IR/ER  2x15 2lb      Prone hip ext     5x B                              Time spent during todays treatment educating on plan to build independence towards a HEP, cause of pain related to hip pathology and lumbar region  Also continued postural education

## 2021-05-17 ENCOUNTER — TRANSCRIBE ORDERS (OUTPATIENT)
Dept: PHYSICAL THERAPY | Facility: CLINIC | Age: 71
End: 2021-05-17

## 2021-05-17 DIAGNOSIS — M25.552 LEFT HIP PAIN: Primary | ICD-10-CM

## 2021-05-17 DIAGNOSIS — M54.16 LUMBAR RADICULOPATHY: ICD-10-CM

## 2021-05-18 ENCOUNTER — APPOINTMENT (OUTPATIENT)
Dept: PHYSICAL THERAPY | Facility: CLINIC | Age: 71
End: 2021-05-18
Payer: MEDICARE

## 2021-05-19 ENCOUNTER — OFFICE VISIT (OUTPATIENT)
Dept: PHYSICAL THERAPY | Facility: CLINIC | Age: 71
End: 2021-05-19
Payer: MEDICARE

## 2021-05-19 DIAGNOSIS — M25.552 LEFT HIP PAIN: Primary | ICD-10-CM

## 2021-05-19 DIAGNOSIS — M54.16 LUMBAR RADICULOPATHY: ICD-10-CM

## 2021-05-19 PROCEDURE — 97112 NEUROMUSCULAR REEDUCATION: CPT | Performed by: PHYSICAL THERAPIST

## 2021-05-19 PROCEDURE — 97110 THERAPEUTIC EXERCISES: CPT | Performed by: PHYSICAL THERAPIST

## 2021-05-19 NOTE — PROGRESS NOTES
Daily Note     Today's date: 2021  Patient name: Lilian Duarte  : 1950  MRN: [id number]  Referring provider: Obed Magallanes MD  Dx:   Encounter Diagnosis     ICD-10-CM    1  Left hip pain  M25 552    2  Lumbar radiculopathy  M54 16                   Subjective: Lilian Duarte states she was very active cleaning around the home and going to pilates  She noticed later in day after sitting she had increased pain when initiating movement following  Objective: See treatment diary below      Assessment: Tolerated treatment well  Patient demonstrated fatigue post treatment but exhibited improved ability to recruit glutes during weight shift after tactile cues and cues to remember "volleyball" movements from her youth  She continues with sign L glut weakness vs R  Plan: Continue per plan of care           Precautions: None  HEP initiated as per oanh: [id number] progressed HEP on     Manuals    PROM hip IR in prone                                Neuro Re-Ed        Hook PPT - hip flex off table        SLS     5x10s B   Bridge with PPT   Bridge with band abd  3x10 blue     Stand hip hike 2x10 5s       Tandem stance EC   2x30s B     Sit to stand [id number]       Tandem walk fwd/retro   5x20ft 5x20ft    Side step    5x20ft    Ice skaters ML    15x5s    Squat weight shift 2x10 5s       Fwd step up   15x B BOSU 2x10 6" B    Lat step up        Ther Ex        Hook LS rot 15x  15x  Ft up 2x10 15x 15x   Hook knee fallouts        Rec bike L3 6'  L3 6'     SL hip abd    2x10 B 5x B   SL hip IR/ER   2x10 B     SL clamshell         Hook KATARZYNA str 2x30s B   2x30s    Hook ITB str 2x30s B   3x30s    Seated Ball roll out - L/M/R        Seated hip flex/ER (cross Leg)        Seated piriformis str        Sup quad str w/SOS        Prone hip IR/ER        Prone hip ext     5x B

## 2021-05-21 ENCOUNTER — OFFICE VISIT (OUTPATIENT)
Dept: PHYSICAL THERAPY | Facility: CLINIC | Age: 71
End: 2021-05-21
Payer: MEDICARE

## 2021-05-21 DIAGNOSIS — M54.16 LUMBAR RADICULOPATHY: ICD-10-CM

## 2021-05-21 DIAGNOSIS — M25.552 LEFT HIP PAIN: Primary | ICD-10-CM

## 2021-05-21 PROCEDURE — 97110 THERAPEUTIC EXERCISES: CPT | Performed by: PHYSICAL THERAPIST

## 2021-05-21 NOTE — PROGRESS NOTES
Daily Note     Today's date: 2021  Patient name: Shaina Prasad  : 1950  MRN: [id number]  Referring provider: Satya Hough MD  Dx:   Encounter Diagnosis     ICD-10-CM    1  Left hip pain  M25 552    2  Lumbar radiculopathy  M54 16                   Subjective: Shaina Prasad states she was able to ride on the motorcycle for >1 hr without hip pain  She still reports soreness and stiffness after a lot of activity around home but responds well to stretching  She reports her left shoulder is really painful and hard to raise overhead  Objective: See treatment diary below  Pt with TTP along left biceps tendon, supra, infra, teres minor  She hikes prematurely with active flexion overhead and reports pain  Assessment: Tolerated treatment well  Patient with signs of left RTC tendonitis with possible underlying tear  She was given basic HEP to work on  Her left lateral/anterior hip pain continues to improve with decreasing pain and improving understanding in proper performance of her HEP  Plan: Continue per plan of care  Progress left hip program to build strength, balance and towards I HEP  May need to fully evaluate left shoulder in the future          Precautions: None  HEP initiated as per [facility]: [id number] progressed HEP on     Manuals    PROM hip IR in prone                                Neuro Re-Ed        Hook PPT - hip flex off table        SLS     5x10s B   Bridge with PPT        Stand hip hike 2x10 5s       Tandem stance EC        Sit to stand [id number]       Tandem walk fwd/retro    5x20ft    Side step    5x20ft    Ice skaters ML    15x5s    Squat weight shift 2x10 5s       Fwd step up    2x10 6" B    Lat step up        Ther Ex        Hook LS rot 15x 20x  15x 15x   Hook knee fallouts        Rec bike L3 6'       SL hip abd    2x10 B 5x B   SL hip IR/ER        SL clamshell         Hook KATARZYNA str 2x30s B   2x30s    Hook ITB str 2x30s B 2x30s B  3x30s Seated Ball roll out - L/M/R        Seated hip flex/ER (cross Leg)        Seated piriformis str        Sup quad str w/SOS        Prone hip IR/ER        Prone hip ext     5x B   Hook cane flex  3x10      Sup cane ER 45 abd  3x10      0 abd ER  3x10

## 2021-05-26 ENCOUNTER — OFFICE VISIT (OUTPATIENT)
Dept: PHYSICAL THERAPY | Facility: CLINIC | Age: 71
End: 2021-05-26
Payer: MEDICARE

## 2021-05-26 DIAGNOSIS — M54.16 LUMBAR RADICULOPATHY: ICD-10-CM

## 2021-05-26 DIAGNOSIS — M25.552 LEFT HIP PAIN: Primary | ICD-10-CM

## 2021-05-26 PROCEDURE — 97110 THERAPEUTIC EXERCISES: CPT | Performed by: PHYSICAL THERAPIST

## 2021-05-26 PROCEDURE — 97112 NEUROMUSCULAR REEDUCATION: CPT | Performed by: PHYSICAL THERAPIST

## 2021-05-28 ENCOUNTER — OFFICE VISIT (OUTPATIENT)
Dept: PHYSICAL THERAPY | Facility: CLINIC | Age: 71
End: 2021-05-28
Payer: MEDICARE

## 2021-05-28 DIAGNOSIS — M25.552 LEFT HIP PAIN: Primary | ICD-10-CM

## 2021-05-28 DIAGNOSIS — M54.16 LUMBAR RADICULOPATHY: ICD-10-CM

## 2021-05-28 PROCEDURE — 97110 THERAPEUTIC EXERCISES: CPT | Performed by: PHYSICAL THERAPIST

## 2021-05-28 PROCEDURE — 97112 NEUROMUSCULAR REEDUCATION: CPT | Performed by: PHYSICAL THERAPIST

## 2021-05-28 NOTE — PROGRESS NOTES
Discharge Note     Today's date: 2021  Patient name: Lilina Duarte  : 1950  MRN: [id number]  Referring provider: Obed Magallanes MD  Dx:   Encounter Diagnosis     ICD-10-CM    1  Left hip pain  M25 552    2  Lumbar radiculopathy  M54 16                   Subjective: Lilian Duarte reports intermittent stiffness and/or pain in the left lateral hip and groin  She reports this occurs following a lot of weight bearing activity  She reports weakness with lifting her left leg to get onto her boyfriends motorcycle  Lilian Duarte reports pain rates from a 0-3/10  She reports a 70% overall improvement regarding left low back hip pain  She reports she continues to have to negotiate stairs non-reciprocal pattern when fatigued  Objective: See treatment diary below  L SLS >30sec  MMT: Left hip flexion 5/5  Hip abd 4/5    Assessment: Tolerated treatment well  Patient has made great progress at [facility]  for her left hip/low back pain  She continues to exhibit signs of left hip OA but has reported decreased pain levels and improved functional mobility  She is now I with [facility] and with continued compliance she will be able to continually address remaining deficits  FOTO Score: 69  Projected Score: 63  Goals  Short term goals to be accomplished in 2-4weeks: - Met  STG 1: Pt will demo I with HEP to maximize progress between therapy sessions  STG 2: Pt will demo 1/2 MMT grade core/hip strength to improve lumbar stability with functional challenges  STG 3: Pt will reports pain dec freq and intensity 50%    Long term goals to be accomplished in 4-8 weeks: - Met  LTG 1: Pt will be able to negotiate stairs reciprocally >75% of time  LTG 2: Pt will be able to return to walking activity for >1hour pain free as per PLOF  LTG 3: Pt will demo improved SLS balance to >20 sec B  Plan: Lilian Duarte is to be discharged to her I HEP to continually address remaining deficits for left hip OA/lumbar radiculopathy    She is to return to [facility] to begin treatment for left shoulder pain          Precautions: None  HEP initiated as per oanh: [id number] progressed HEP on 4/16    Manuals 5/19 5/21 5/26 5/28 5/14   PROM hip IR in prone                                Neuro Re-Ed        Fwd lunge   10xB     SLS   Hip abd  5x10s B   Sup scap retr   15x5s     Stand hip hike 2x10 5s       Tandem stance EC        Sit to stand [id number]       Side step    [id number]    Squat weight shift 2x10 5s       Fwd step up    10x 8" fwd up/lateral/down    Ther Ex        Hook LS rot 15x 20x  15x 15x   Hook knee fallouts    15x    Rec bike L3 6'       SL hip abd    10x 5x B   SL hip IR/ER        SL clamshell         Hook KATARZYNA str 2x30s B   2x30s    Hook ITB str 2x30s B 2x30s B Stand 2x30s 2x30s    Seated Ball roll out - L/M/R        Seated hip flex/ER (cross Leg)        Seated piriformis str        Sup quad str w/SOS        Prone hip IR/ER        Prone hip ext     5x B   Hook cane flex  3x10 2x10 2x10    Sup cane ER 45 abd  3x10  2x10    0 abd ER  3x10 SL ER 2x10 Stand 20x

## 2021-06-02 ENCOUNTER — OFFICE VISIT (OUTPATIENT)
Dept: PHYSICAL THERAPY | Facility: CLINIC | Age: 71
End: 2021-06-02
Payer: MEDICARE

## 2021-06-02 DIAGNOSIS — M25.512 CHRONIC LEFT SHOULDER PAIN: Primary | ICD-10-CM

## 2021-06-02 DIAGNOSIS — M75.82 ROTATOR CUFF TENDONITIS, LEFT: ICD-10-CM

## 2021-06-02 DIAGNOSIS — G89.29 CHRONIC LEFT SHOULDER PAIN: Primary | ICD-10-CM

## 2021-06-02 PROCEDURE — 97112 NEUROMUSCULAR REEDUCATION: CPT | Performed by: PHYSICAL THERAPIST

## 2021-06-02 PROCEDURE — 97110 THERAPEUTIC EXERCISES: CPT | Performed by: PHYSICAL THERAPIST

## 2021-06-02 NOTE — LETTER
2021    Alec Sidhu MD  [address]    Patient: Miesha Champagne   YOB: 1950   Date of Visit: 2021     Encounter Diagnosis     ICD-10-CM    1  Chronic left shoulder pain  M25 512     G89 29    2  Rotator cuff tendonitis, left  M75 82        Dear Dr Althea Quiñonez: Thank you for your recent referral of Miesha Champagne  Please review the attached evaluation summary from [facility]'s recent visit  Please verify that you agree with the plan of care by signing the attached order  If you have any questions or concerns, please do not hesitate to call  I sincerely appreciate the opportunity to share in the care of one of your patients and hope to have another opportunity to work with you in the near future  Sincerely,    Fermin Parrish, PT      Referring Provider:      I certify that I have read the below Plan of Care and certify the need for these services furnished under this plan of treatment while under my care  Alec Sidhu MD  [address]  Via Fax: [phone]          PT Evaluation     Today's date: 2021  Patient name: Miesha Champagne  : 1950  MRN: [id number]  Referring provider: Self, Referral  Dx:   Encounter Diagnosis     ICD-10-CM    1  Chronic left shoulder pain  M25 512     G89 29    2  Rotator cuff tendonitis, left  M75 82                   Assessment  Assessment details: Miesha Champagne is a 72 y o  female who presents with pain, decreased strength and decreased ROM Due to these impairments, patient has difficulty performing ADL's, recreational activities, lifting/carrying, reaching  Patient's clinical presentation is consistent with left rotator cuff tendonitis  Patient has been educated in postural education, home exercise program and plan of care   Patient would benefit from skilled physical therapy services to address their aforementioned functional limitations and progress towards prior level of function and independence with home exercise program     Impairments: abnormal or restricted ROM, activity intolerance, impaired physical strength, lacks appropriate home exercise program, pain with function, poor posture  and poor body mechanics  Understanding of Dx/Px/POC: good   Prognosis: good    Goals  STG:  to be achieved within 2-4 weeks  1  Pt will demo 50% improvement in shoulder AROM to improve self care and household ADLs   2  Improve shoulder strength in all deficient planes by 1/2 MMT  3  Pt will have good understanding of basic HEP  LTG:  to be achieved within 4-8 weeks  1  Improve strength to be able to put dishes into an overhead cabinet  2  Pt will be able to sleep without disturbance secondary to shoulder pain  3  Pt will demo full shoulder AROM to return to household duties  4  Pt will be I with comprehensive I HEP  Plan  Plan details:  HEP development, stretching, strengthening, A/AA/PROM, joint mobilizations, posture education, STM/MI as needed to reduce muscle tension, muscle reeducation, PLOC discussed and agreed upon with patient  Patient would benefit from: PT eval and skilled physical therapy  Planned modality interventions: cryotherapy and thermotherapy: hydrocollator packs  Planned therapy interventions: manual therapy, neuromuscular re-education, self care, therapeutic exercise, therapeutic activities, home exercise program, joint mobilization, postural training, patient education, strengthening and stretching  Frequency: 2x week  Duration in weeks: 6  Treatment plan discussed with: patient        Subjective Evaluation    History of Present Illness  Mechanism of injury: Khushboo Shane states she has been experiencing left shoulder pain since June 2020  She believes is began as a result of compensation from increased left low back and hip pain       She reports difficulty and pain with lifting and reaching her arm overhead and she feels weak   She reports pain and tightness with difficulty reaching behind her back  She also reports pain with laying on left shoulder pain  As per  FOTO pt reports "Much difficulty" with: Reach a shelf that is at shoulder height; Turn on a faucet in the same direction as your affected arm; Turn on a faucet in the opposite direction as your affect arm  "Some difficulty" with: Flushing the toilet  "Little bit of difficulty" with: Taking off glasses or sunglasses  Pain  Current pain ratin  At best pain ratin  At worst pain ratin  Location: Anterior and posterior left shoulder pain  Quality: sharp  Relieving factors: medications    Social Support  Lives with: alone    Employment status: not working  Hand dominance: right      Diagnostic Tests  No diagnostic tests performed  Patient Goals  Patient goals for therapy: increased strength, return to sport/leisure activities, independence with ADLs/IADLs, decreased pain and increased motion          Objective     Palpation   Left   Hypertonic in the latissimus, levator scapulae and pectoralis minor  Tenderness of the biceps, infraspinatus, pectoralis minor, supraspinatus and teres minor       Active Range of Motion   Left Shoulder   Flexion: 142 degrees with pain  Abduction: 90 degrees with pain  External rotation BTH: T2 with pain  Internal rotation BTB: T9 with pain    Right Shoulder   Flexion: 167 degrees   Abduction: 180 degrees   External rotation BTH: T3   Internal rotation BTB: T5     Scapular Mobility   Left Shoulder   Scapular Mobility with Shoulder to 90° FF   Scapular elevation: minimal  Upward rotation: premature    Strength/Myotome Testing     Left Shoulder     Planes of Motion   Flexion: 3 (Pain with all MMT on Left)   Abduction: 3-   External rotation at 0°: 3+   Internal rotation at 0°: 4     Right Shoulder     Planes of Motion   Flexion: 5   Abduction: 4+   External rotation at 0°: 4+   Internal rotation at 0°: 5     Tests     Left Shoulder Positive drop arm, empty can, Neer's and painful arc         Flowsheet Rows      Most Recent Value   PT/OT G-Codes   Current Score  38   Projected Score  59             Precautions: None  Initiated HEP as per [employer] access code: [email or web address]    Manuals 6/2                                       Neuro Re-Ed        Sup scap ret 2x10 5s       Sup GH alpha 1x 90degrees                                               Ther Ex        SL ER 2x10       Sup cane flex 2x10       Sup cane ER 2x10 45 abd       Pendulums 30x cw/ccw                                       Ther Activity                        Gait Training                        Modalities

## 2021-06-02 NOTE — PROGRESS NOTES
PT Evaluation     Today's date: 2021  Patient name: Sol Erazo  : 1950  MRN: [id number]  Referring provider: Self, Referral  Dx:   Encounter Diagnosis     ICD-10-CM    1  Chronic left shoulder pain  M25 512     G89 29    2  Rotator cuff tendonitis, left  M75 82                   Assessment  Assessment details: Sol Erazo is a 72 y o  female who presents with pain, decreased strength and decreased ROM Due to these impairments, patient has difficulty performing ADL's, recreational activities, lifting/carrying, reaching  Patient's clinical presentation is consistent with left rotator cuff tendonitis  Patient has been educated in postural education, home exercise program and plan of care  Patient would benefit from skilled physical therapy services to address their aforementioned functional limitations and progress towards prior level of function and independence with home exercise program     Impairments: abnormal or restricted ROM, activity intolerance, impaired physical strength, lacks appropriate home exercise program, pain with function, poor posture  and poor body mechanics  Understanding of Dx/Px/POC: good   Prognosis: good    Goals  STG:  to be achieved within 2-4 weeks  1  Pt will demo 50% improvement in shoulder AROM to improve self care and household ADLs   2  Improve shoulder strength in all deficient planes by 1/2 MMT  3  Pt will have good understanding of basic HEP  LTG:  to be achieved within 4-8 weeks  1  Improve strength to be able to put dishes into an overhead cabinet  2  Pt will be able to sleep without disturbance secondary to shoulder pain  3  Pt will demo full shoulder AROM to return to household duties  4  Pt will be I with comprehensive I HEP      Plan  Plan details:  HEP development, stretching, strengthening, A/AA/PROM, joint mobilizations, posture education, STM/MI as needed to reduce muscle tension, muscle reeducation, PLOC discussed and agreed upon with patient  Patient would benefit from: PT eval and skilled physical therapy  Planned modality interventions: cryotherapy and thermotherapy: hydrocollator packs  Planned therapy interventions: manual therapy, neuromuscular re-education, self care, therapeutic exercise, therapeutic activities, home exercise program, joint mobilization, postural training, patient education, strengthening and stretching  Frequency: 2x week  Duration in weeks: 6  Treatment plan discussed with: patient        Subjective Evaluation    History of Present Illness  Mechanism of injury: Devon Heard states she has been experiencing left shoulder pain since 2020  She believes is began as a result of compensation from increased left low back and hip pain  She reports difficulty and pain with lifting and reaching her arm overhead and she feels weak  She reports pain and tightness with difficulty reaching behind her back  She also reports pain with laying on left shoulder pain  As per  FOTO pt reports "Much difficulty" with: Reach a shelf that is at shoulder height; Turn on a faucet in the same direction as your affected arm; Turn on a faucet in the opposite direction as your affect arm  "Some difficulty" with: Flushing the toilet  "Little bit of difficulty" with: Taking off glasses or sunglasses  Pain  Current pain ratin  At best pain ratin  At worst pain ratin  Location: Anterior and posterior left shoulder pain  Quality: sharp  Relieving factors: medications    Social Support  Lives with: alone    Employment status: not working  Hand dominance: right      Diagnostic Tests  No diagnostic tests performed  Patient Goals  Patient goals for therapy: increased strength, return to sport/leisure activities, independence with ADLs/IADLs, decreased pain and increased motion          Objective     Palpation   Left   Hypertonic in the latissimus, levator scapulae and pectoralis minor     Tenderness of the biceps, infraspinatus, pectoralis minor, supraspinatus and teres minor  Active Range of Motion   Left Shoulder   Flexion: 142 degrees with pain  Abduction: 90 degrees with pain  External rotation BTH: T2 with pain  Internal rotation BTB: T9 with pain    Right Shoulder   Flexion: 167 degrees   Abduction: 180 degrees   External rotation BTH: T3   Internal rotation BTB: T5     Scapular Mobility   Left Shoulder   Scapular Mobility with Shoulder to 90° FF   Scapular elevation: minimal  Upward rotation: premature    Strength/Myotome Testing     Left Shoulder     Planes of Motion   Flexion: 3 (Pain with all MMT on Left)   Abduction: 3-   External rotation at 0°: 3+   Internal rotation at 0°: 4     Right Shoulder     Planes of Motion   Flexion: 5   Abduction: 4+   External rotation at 0°: 4+   Internal rotation at 0°: 5     Tests     Left Shoulder   Positive drop arm, empty can, Neer's and painful arc         Flowsheet Rows      Most Recent Value   PT/OT G-Codes   Current Score  38   Projected Score  59             Precautions: None  Initiated HEP as per [employer] access code: [email or web address]    Manuals 6/2                                       Neuro Re-Ed        Sup scap ret 2x10 5s       Sup GH alpha 1x 90degrees                                               Ther Ex        SL ER 2x10       Sup cane flex 2x10       Sup cane ER 2x10 45 abd       Pendulums 30x cw/ccw                                       Ther Activity                        Gait Training                        Modalities

## 2021-06-04 ENCOUNTER — OFFICE VISIT (OUTPATIENT)
Dept: PHYSICAL THERAPY | Facility: CLINIC | Age: 71
End: 2021-06-04
Payer: MEDICARE

## 2021-06-04 DIAGNOSIS — M25.512 CHRONIC LEFT SHOULDER PAIN: Primary | ICD-10-CM

## 2021-06-04 DIAGNOSIS — G89.29 CHRONIC LEFT SHOULDER PAIN: Primary | ICD-10-CM

## 2021-06-04 DIAGNOSIS — M75.82 ROTATOR CUFF TENDONITIS, LEFT: ICD-10-CM

## 2021-06-04 PROCEDURE — 97112 NEUROMUSCULAR REEDUCATION: CPT | Performed by: PHYSICAL THERAPIST

## 2021-06-04 NOTE — PROGRESS NOTES
Daily Note     Today's date: 2021  Patient name: Jovita Mason  : 1950  MRN: [id number]  Referring provider: Self, Referral  Dx:   Encounter Diagnosis     ICD-10-CM    1  Chronic left shoulder pain  M25 512     G89 29    2  Rotator cuff tendonitis, left  M75 82                   Subjective: Jovita Mason reports she felt a little soreness in her shoulder since starting new HEP but not significant pain  Objective: See treatment diary below      Assessment: Tolerated treatment well  Patient needed cues to correctly perform all of her HEP and time was spent reviewing for ability to perform I at home  She continues with poor RTC strength and scapular stability with tendency to compensate with anterior musculature  Plan: Continue per plan of care        Precautions: None  Initiated HEP as per [employer] access code: [email or web address]    Manuals                                       Neuro Re-Ed        Sup scap ret 2x10 5s 2x10 5s      Sup GH alpha 1x 90degrees 2x 90      Sup GH stabs  90 2x30s                                      Ther Ex        SL ER 2x10 2x10      Sup cane flex 2x10 2x10      Sup cane ER 2x10 45 abd 2x10      Pendulums 30x cw/ccw 30x cw/ccw      Behind Back IR str  3x30s                              Ther Activity                        Gait Training                        Modalities

## 2021-06-09 ENCOUNTER — OFFICE VISIT (OUTPATIENT)
Dept: PHYSICAL THERAPY | Facility: CLINIC | Age: 71
End: 2021-06-09
Payer: MEDICARE

## 2021-06-09 DIAGNOSIS — G89.29 CHRONIC LEFT SHOULDER PAIN: Primary | ICD-10-CM

## 2021-06-09 DIAGNOSIS — M75.82 ROTATOR CUFF TENDONITIS, LEFT: ICD-10-CM

## 2021-06-09 DIAGNOSIS — M25.512 CHRONIC LEFT SHOULDER PAIN: Primary | ICD-10-CM

## 2021-06-09 PROCEDURE — 97110 THERAPEUTIC EXERCISES: CPT | Performed by: PHYSICAL THERAPIST

## 2021-06-09 PROCEDURE — 97112 NEUROMUSCULAR REEDUCATION: CPT | Performed by: PHYSICAL THERAPIST

## 2021-06-09 NOTE — PROGRESS NOTES
Daily Note     Today's date: 2021  Patient name: Yogi Gutiérrez  : 1950  MRN: [id number]  Referring provider: Self, Referral  Dx:   Encounter Diagnosis     ICD-10-CM    1  Chronic left shoulder pain  M25 512     G89 29    2  Rotator cuff tendonitis, left  M75 82                   Subjective: Yoig Gutiérrez reports her shoulder is feeling better and is able to reach into cabinet easier while feeling stronger  Objective: See treatment diary below      Assessment: Tolerated treatment well  Patient continues to demo low RTC strength and scapular stability with need for cues to avoid fwd rounding of shoulder with active flexion against gravity  Plan: Continue per plan of care  Precautions: None  Initiated HEP as per [employer] access code: [email or web address]    Manuals                                      Neuro Re-Ed        Sup scap ret 2x10 5s 2x10 5s      Sup GH alpha 1x 90degrees 2x 90 2x 90  1x 60     Sup GH stabs  90 2x30s      Seated 90/90 ER   2x10 cues to scap stab     Seated 90/90 IR   2x10 conc only w/MR                     Ther Ex        SL ER 2x10 2x10      Sup cane flex 2x10 2x10 2x10     Sup cane ER 2x10 45 abd 2x10      Pendulums 30x cw/ccw 30x cw/ccw      Behind Back IR str  3x30s      Dasha ER/IR   3x10 ea   0 5 ER  1 5 IR                     Ther Activity                        Gait Training                        Modalities                        Patient treated by YOLI Reyes under my direct supervision

## 2021-06-11 ENCOUNTER — OFFICE VISIT (OUTPATIENT)
Dept: PHYSICAL THERAPY | Facility: CLINIC | Age: 71
End: 2021-06-11
Payer: MEDICARE

## 2021-06-11 DIAGNOSIS — G89.29 CHRONIC LEFT SHOULDER PAIN: Primary | ICD-10-CM

## 2021-06-11 DIAGNOSIS — M25.512 CHRONIC LEFT SHOULDER PAIN: Primary | ICD-10-CM

## 2021-06-11 DIAGNOSIS — M75.82 ROTATOR CUFF TENDONITIS, LEFT: ICD-10-CM

## 2021-06-11 PROCEDURE — 97112 NEUROMUSCULAR REEDUCATION: CPT | Performed by: PHYSICAL THERAPIST

## 2021-06-11 PROCEDURE — 97110 THERAPEUTIC EXERCISES: CPT | Performed by: PHYSICAL THERAPIST

## 2021-06-11 NOTE — PROGRESS NOTES
Daily Note     Today's date: 2021  Patient name: Khushboo Shane  : 1950  MRN: [id number]  Referring provider: Self, Referral  Dx:   Encounter Diagnosis     ICD-10-CM    1  Chronic left shoulder pain  M25 512     G89 29    2  Rotator cuff tendonitis, left  M75 82        Start Time: 930  Stop Time: 1015  Total time in clinic (min): 45 minutes    Subjective: Khushboo Shane stated that the previous day, she was experiencing some aching pain in her left upper trapezius muscle and left RTC  Objective: See treatment diary below      Assessment: Tolerated treatment well  Patient exhibited good technique with therapeutic exercises but required frequent cueing throughout the session to maintain proper scapular position  Patient was unable to consistently flex shoulder beyond shoulder height without active assistance and tactile cues for proper scapular position  Patient demonstrated fatigue in RTC musculature post-treatment  Plan: Continue per plan of care  Precautions: None  Initiated HEP as per [employer] access code: [email or web address]    Manuals                                     Neuro Re-Ed        Sup scap ret 2x10 5s 2x10 5s      Sup GH alpha 1x 90degrees 2x 90 2x 90  1x 60     Sup GH stabs  90 2x30s      Seated 90/90 ER   2x10 cues to scap stab     Seated 90/90 IR   2x10 conc only w/MR     Standing flex AROM    3x10  AAROM 3x10 w/ cane    SL flexion    3x10    SL ER    3x10    SL abd    3x10    Ther Ex        SL ER 2x10 2x10      Sup cane flex 2x10 2x10 2x10     Sup cane ER 2x10 45 abd 2x10      Pendulums 30x cw/ccw 30x cw/ccw      Behind Back IR str  3x30s      Dasha ER/IR   3x10 ea   0 5 ER  1 5 IR     Upper trap str    4x30s BL    UBE    5'    Ther Activity                        Gait Training                        Modalities                        Patient treated by YOLI Mckenzie under my direct supervision

## 2021-06-14 ENCOUNTER — OFFICE VISIT (OUTPATIENT)
Dept: PHYSICAL THERAPY | Facility: CLINIC | Age: 71
End: 2021-06-14
Payer: MEDICARE

## 2021-06-14 ENCOUNTER — APPOINTMENT (OUTPATIENT)
Dept: PHYSICAL THERAPY | Facility: CLINIC | Age: 71
End: 2021-06-14
Payer: MEDICARE

## 2021-06-14 DIAGNOSIS — M25.512 CHRONIC LEFT SHOULDER PAIN: Primary | ICD-10-CM

## 2021-06-14 DIAGNOSIS — G89.29 CHRONIC LEFT SHOULDER PAIN: Primary | ICD-10-CM

## 2021-06-14 DIAGNOSIS — M75.82 ROTATOR CUFF TENDONITIS, LEFT: ICD-10-CM

## 2021-06-14 PROCEDURE — 97110 THERAPEUTIC EXERCISES: CPT | Performed by: PHYSICAL THERAPIST

## 2021-06-14 PROCEDURE — 97112 NEUROMUSCULAR REEDUCATION: CPT | Performed by: PHYSICAL THERAPIST

## 2021-06-14 NOTE — PROGRESS NOTES
Daily Note     Today's date: 2021  Patient name: Mahsa Villarreal  : 1950  MRN: [id number]  Referring provider: Self, Referral  Dx:   Encounter Diagnosis     ICD-10-CM    1  Chronic left shoulder pain  M25 512     G89 29    2  Rotator cuff tendonitis, left  M75 82                   Subjective: Mahsa Villarreal reports her shoulder had pain over weekend with reaching in certain directions, especially reaching overhead  Objective: See treatment diary below      Assessment: Tolerated treatment fair  Patient continues with low RTC strength limiting active flexion against gravity leading to scapular hiking and increased signs of pain and subacromial impingement  Plan: Continue per plan of care  Goal to focus on improving RTC strength/endurance to restore reaching at and above shld height       Precautions: None  Initiated HEP as per [employer] access code: [email or web address]    Manuals                                    Neuro Re-Ed        Sup scap ret 2x10 5s 2x10 5s      Sup GH alpha 1x 90degrees 2x 90 2x 90  1x 60  Sup GH flex 60-full 2x10   Sup GH stabs  90 2x30s      Seated 90/90 ER   2x10 cues to scap stab     Seated 90/90 IR   2x10 conc only w/MR     Standing flex AROM    3x10  AAROM 3x10 w/ cane    SL flexion    3x10 2x10   SL ER    3x10 Stand ecc MR 2x10   SL abd    3x10    Prone scap ret     2x10 5s   + w/row 2x10   Ther Ex        SL ER 2x10 2x10      Sup cane flex 2x10 2x10 2x10     Sup cane ER 2x10 45 abd 2x10   45/90 abd 15x ea (PT assist)   Pendulums 30x cw/ccw 30x cw/ccw   30x cw/ccw   Behind Back IR str  3x30s      Dasha ER/IR   3x10 ea   0 5 ER  1 5 IR     Upper trap str    4x30s BL    UBE    5'    Ther Activity                        Gait Training                        Modalities

## 2021-06-16 ENCOUNTER — OFFICE VISIT (OUTPATIENT)
Dept: PHYSICAL THERAPY | Facility: CLINIC | Age: 71
End: 2021-06-16
Payer: MEDICARE

## 2021-06-16 DIAGNOSIS — G89.29 CHRONIC LEFT SHOULDER PAIN: Primary | ICD-10-CM

## 2021-06-16 DIAGNOSIS — M25.512 CHRONIC LEFT SHOULDER PAIN: Primary | ICD-10-CM

## 2021-06-16 DIAGNOSIS — M75.82 ROTATOR CUFF TENDONITIS, LEFT: ICD-10-CM

## 2021-06-16 PROCEDURE — 97110 THERAPEUTIC EXERCISES: CPT | Performed by: PHYSICAL THERAPIST

## 2021-06-16 PROCEDURE — 97112 NEUROMUSCULAR REEDUCATION: CPT | Performed by: PHYSICAL THERAPIST

## 2021-06-16 NOTE — PROGRESS NOTES
Daily Note     Today's date: 2021  Patient name: Chris Urban  : 1950  MRN: [id number]  Referring provider: Self, Referral  Dx:   Encounter Diagnosis     ICD-10-CM    1  Chronic left shoulder pain  M25 512     G89 29    2  Rotator cuff tendonitis, left  M75 82                   Subjective: Chris Urban stated that she was feeling sore following her last session  However, she reported no significant increases in pain  She stated that she is avoiding lifting objects, especially beyond shoulder height  Objective: See treatment diary below      Assessment: Tolerated treatment well  Patient exhibited good technique with therapeutic exercises but required frequent verbal and tactile cueing to maintain proper scapular position  Patient experienced some pain and decreased ROM with ER that was addressed with PROM  Patient demonstrates increased tolerance to activity without pain  Plan: Continue per plan of care        Precautions: None  Initiated HEP as per [employer] access code: [email or web address]    Manuals    PROM ER                               Neuro Re-Ed        Sup scap ret  2x10 5s      Sup GH alpha  2x 90 2x 90  1x 60  Sup GH flex 60-full 2x10   Sup GH stabs  90 2x30s      Seated 90/90 ER   2x10 cues to scap stab     Seated 90/90 IR   2x10 conc only w/MR     Standing flex AROM    3x10  AAROM 3x10 w/ cane    SL flexion    3x10 2x10   SL ER 3x10 ecc MR   3x10 Stand ecc MR 2x10   SL abd 2x10   3x10    Prone scap ret     2x10 5s   + w/row 2x10   SL scaption 2x10 with VCs for proper scapular position        Serratus wall walks 2x10 M/L  2x10 A/P       Ther Ex        SL ER  2x10      Sup cane flex 2x10 2x10 2x10     Sup cane ER  2x10   45/90 abd 15x ea (PT assist)   Pendulums  30x cw/ccw   30x cw/ccw   Behind Back IR str  3x30s      [place] ER/IR   3x10 ea   0 5 ER  1 5 IR     Upper trap str    4x30s BL    ER AROM 2x20       UBE    5'    Ther Activity                        Gait Training                        Modalities                  Patient treated by YOLI Araujo under my direct supervision

## 2021-06-16 NOTE — PROGRESS NOTES
Daily Note     Today's date: 2021  Patient name: Miesha Champagne  : 1950  MRN: [id number]  Referring provider: Self, Referral  Dx:   Encounter Diagnosis     ICD-10-CM    1  Chronic left shoulder pain  M25 512     G89 29    2  Rotator cuff tendonitis, left  M75 82                   Subjective: ***      Objective: See treatment diary below      Assessment: Tolerated treatment {Tolerated treatment :1173258661}   Patient {assessment:8944381016}      Plan: {PLAN:7202179288}     Precautions: None  Initiated HEP as per [employer] access code: [email or web address]    Manuals                                    Neuro Re-Ed        Sup scap ret 2x10 5s 2x10 5s      Sup GH alpha 1x 90degrees 2x 90 2x 90  1x 60  Sup GH flex 60-full 2x10   Sup GH stabs  90 2x30s      Seated 90/90 ER   2x10 cues to scap stab     Seated 90/90 IR   2x10 conc only w/MR     Standing flex AROM    3x10  AAROM 3x10 w/ cane    SL flexion    3x10 2x10   SL ER    3x10 Stand ecc MR 2x10   SL abd    3x10    Prone scap ret     2x10 5s   + w/row 2x10   Ther Ex        SL ER 2x10 2x10      Sup cane flex 2x10 2x10 2x10     Sup cane ER 2x10 45 abd 2x10   45/90 abd 15x ea (PT assist)   Pendulums 30x cw/ccw 30x cw/ccw   30x cw/ccw   Behind Back IR str  3x30s      Dasha ER/IR   3x10 ea   0 5 ER  1 5 IR     Upper trap str    4x30s BL    UBE    5'    Ther Activity                        Gait Training                        Modalities

## 2021-06-18 ENCOUNTER — APPOINTMENT (OUTPATIENT)
Dept: PHYSICAL THERAPY | Facility: CLINIC | Age: 71
End: 2021-06-18
Payer: MEDICARE

## 2021-06-23 ENCOUNTER — OFFICE VISIT (OUTPATIENT)
Dept: PHYSICAL THERAPY | Facility: CLINIC | Age: 71
End: 2021-06-23
Payer: MEDICARE

## 2021-06-23 DIAGNOSIS — M75.82 ROTATOR CUFF TENDONITIS, LEFT: ICD-10-CM

## 2021-06-23 DIAGNOSIS — G89.29 CHRONIC LEFT SHOULDER PAIN: Primary | ICD-10-CM

## 2021-06-23 DIAGNOSIS — M25.512 CHRONIC LEFT SHOULDER PAIN: Primary | ICD-10-CM

## 2021-06-23 PROCEDURE — 97110 THERAPEUTIC EXERCISES: CPT

## 2021-06-23 PROCEDURE — 97140 MANUAL THERAPY 1/> REGIONS: CPT

## 2021-06-23 PROCEDURE — 97112 NEUROMUSCULAR REEDUCATION: CPT

## 2021-06-23 NOTE — PROGRESS NOTES
Daily Note     Today's date: 2021  Patient name: Anabela Piper  : 1950  MRN: [id number]  Referring provider: Self, Referral  Dx:   Encounter Diagnosis     ICD-10-CM    1  Chronic left shoulder pain  M25 512     G89 29    2  Rotator cuff tendonitis, left  M75 82        Start Time: 1615  Stop Time: 1700  Total time in clinic (min): 45 minutes    Subjective: Pt reports slight pinching in her shoulder today and it is sore from pilates earlier  Pt reports that she felt sore after her previous session but knows that the exercises are helping her and that it is going to take time for her shoulder to get better  Pt reports 2/10 pain prior to the start of the treatment session  Objective: See treatment diary below      Assessment: Tolerated treatment well  Patient demonstrated fatigue post treatment, exhibited good technique with therapeutic exercises and would benefit from continued PT  Pt required moderate verbal and tactile cueing to maintain scapular retraction and depression during active [address] ER and scaption exercises  Plan: Continue per plan of care  Progress treatment as tolerated         Precautions: None  Initiated HEP as per [employer] access code: [facility]    Manuals    PROM ER Flx, Abd, ER                              Neuro Re-Ed        Sup scap ret        Sup GH alpha   2x 90  1x 60  Sup GH flex 60-full 2x10   Sup GH stabs        Seated 90/90 ER   2x10 cues to scap stab     Seated 90/90 IR   2x10 conc only w/MR     Standing flex AROM    3x10  AAROM 3x10 w/ cane    SL flexion    3x10 2x10   SL ER 3x10 ecc MR 3x10 ecc MR  3x10 Stand ecc MR 2x10   SL abd 2x10 2x10  3x10    Prone scap ret     2x10 5s   + w/row 2x10   SL scaption 2x10 with VCs for proper scapular position  2x10 with VCs for proper scapular position       Serratus wall walks 2x10 M/L  2x10 A/P       Ther Ex        SL ER        Sup cane flex 2x10 2x10 2x10     Sup cane ER     45/90 abd 15x ea (PT assist)   Pendulums     30x cw/ccw   Pulleys  Flx&Abd 3 min ea      Dasha ER/IR   3x10 ea   0 5 ER  1 5 IR     Upper trap str    4x30s BL    ER AROM 2x20 2x20      UBE    5'    Ther Activity                        Gait Training                        Modalities

## 2021-06-25 ENCOUNTER — OFFICE VISIT (OUTPATIENT)
Dept: PHYSICAL THERAPY | Facility: CLINIC | Age: 71
End: 2021-06-25
Payer: MEDICARE

## 2021-06-25 DIAGNOSIS — G89.29 CHRONIC LEFT SHOULDER PAIN: Primary | ICD-10-CM

## 2021-06-25 DIAGNOSIS — M25.512 CHRONIC LEFT SHOULDER PAIN: Primary | ICD-10-CM

## 2021-06-25 DIAGNOSIS — M75.82 ROTATOR CUFF TENDONITIS, LEFT: ICD-10-CM

## 2021-06-25 PROCEDURE — 97112 NEUROMUSCULAR REEDUCATION: CPT

## 2021-06-25 PROCEDURE — 97110 THERAPEUTIC EXERCISES: CPT

## 2021-06-25 NOTE — PROGRESS NOTES
Daily Note     Today's date: 2021  Patient name: Anabela Piper  : 1950  MRN: [id number]  Referring provider: Self, Referral  Dx:   Encounter Diagnosis     ICD-10-CM    1  Chronic left shoulder pain  M25 512     G89 29    2  Rotator cuff tendonitis, left  M75 82        Start Time: 0845  Stop Time: 09  Total time in clinic (min): 45 minutes    Subjective: Anabela Piper stated that she was experiencing some pain and soreness today  She stated that she understands her functional limitations and tries not to utilize her left shoulder for overhead lifting activities, like putting dishes away  Objective: See treatment diary below      Assessment: Tolerated treatment well  Patient demonstrated fatigue post treatment and required frequent verbal and tactile cueing throughout the session to maintain proper scapular position during therapeutic exercise  Patient also experienced intermittent sharp pain throughout the session, which required some exercise modification or termination  Plan: Continue per plan of care        Precautions: None  Initiated HEP as per [employer] access code: [facility]    Manuals    PROM ER Flx, Abd, ER Flex, abd, ER                             Neuro Re-Ed        Sup scap ret   2x10  Standing 1x10     Sup GH alpha     Sup GH flex 60-full 2x10   Sup GH stabs        Seated 90/90 ER        Seated 90/90 IR        Standing flex AROM    3x10  AAROM 3x10 w/ cane    SL flexion    3x10 2x10   SL ER 3x10 ecc MR 3x10 ecc MR 2x15 3x10 Stand ecc MR 2x10   SL abd 2x10 2x10 Attempted 4x but terminated due to pain 3x10    Prone scap ret     2x10 5s   + w/row 2x10   SL scaption 2x10 with VCs for proper scapular position  2x10 with VCs for proper scapular position       Serratus wall walks 2x10 M/L  2x10 A/P       Standing low row   3x10 yellow tubing  Required constant TC for scap stab     Ther Ex        SL ER        Sup cane flex 2x10 2x10      Sup cane ER   3x10 45/90 abd 15x ea (PT assist)   Pendulums     30x cw/ccw   Pulleys  Flx&Abd 3 min ea      Dasha ER/IR        Upper trap str    4x30s BL    ER AROM 2x20 2x20 2x10 yellow tube     UBE    5'    Ther Activity                        Gait Training                        Modalities                    Patient treated by YOLI Cabrera under my direct supervision

## 2021-06-30 ENCOUNTER — OFFICE VISIT (OUTPATIENT)
Dept: PHYSICAL THERAPY | Facility: CLINIC | Age: 71
End: 2021-06-30
Payer: MEDICARE

## 2021-06-30 DIAGNOSIS — M25.512 CHRONIC LEFT SHOULDER PAIN: Primary | ICD-10-CM

## 2021-06-30 DIAGNOSIS — G89.29 CHRONIC LEFT SHOULDER PAIN: Primary | ICD-10-CM

## 2021-06-30 DIAGNOSIS — M75.82 ROTATOR CUFF TENDONITIS, LEFT: ICD-10-CM

## 2021-06-30 PROCEDURE — 97112 NEUROMUSCULAR REEDUCATION: CPT

## 2021-06-30 PROCEDURE — 97110 THERAPEUTIC EXERCISES: CPT

## 2021-07-02 ENCOUNTER — OFFICE VISIT (OUTPATIENT)
Dept: PHYSICAL THERAPY | Facility: CLINIC | Age: 71
End: 2021-07-02
Payer: MEDICARE

## 2021-07-02 DIAGNOSIS — G89.29 CHRONIC LEFT SHOULDER PAIN: Primary | ICD-10-CM

## 2021-07-02 DIAGNOSIS — M25.512 CHRONIC LEFT SHOULDER PAIN: Primary | ICD-10-CM

## 2021-07-02 DIAGNOSIS — M75.82 ROTATOR CUFF TENDONITIS, LEFT: ICD-10-CM

## 2021-07-02 PROCEDURE — 97140 MANUAL THERAPY 1/> REGIONS: CPT

## 2021-07-02 PROCEDURE — 97112 NEUROMUSCULAR REEDUCATION: CPT

## 2021-07-02 PROCEDURE — 97110 THERAPEUTIC EXERCISES: CPT

## 2021-07-02 NOTE — PROGRESS NOTES
Daily Note     Today's date: 2021  Patient name: Cheryl Gutierrez  : 1950  MRN: [id number]  Referring provider: Self, Referral  Dx:   Encounter Diagnosis     ICD-10-CM    1  Chronic left shoulder pain  M25 512     G89 29    2  Rotator cuff tendonitis, left  M75 82        Start Time: 0845  Stop Time: 09  Total time in clinic (min): 50 minutes    Subjective: Pt reports minimal pain prior to session  She reports her pain is increased with certain movements  Objective: See treatment diary below      Assessment: Tolerated treatment well  Patient Consistent cues to focus on task and [address] setting  Plan: Continue per plan of care        Precautions: None  Initiated HEP as per [employer] access code: [facility]    Manuals    PROM ER Flx, Abd, ER Flex, abd, ER Flex/scap/ER in 45/abd/ER in 90                            Neuro Re-Ed        Sup scap ret   2x10  Standing 1x10     Sup GH alpha        Sup GH stabs        Seated 90/90 ER    2x10 with VCs for scap stab 2x10 with VCs for scap stab   Seated 90/90 IR        Standing flex AROM        SL flexion        SL ER 3x10 ecc MR 3x10 ecc MR 2x15 AROM 1x10  AROM with OP 2x10    SL abd 2x10 2x10 Attempted 4x but terminated due to pain  10x   Prone scap ret        SL scaption 2x10 with VCs for proper scapular position  2x10 with VCs for proper scapular position   AROM 3x10  AROM with OP 1x10, attempted a second set but terminated due to fatigue Standing 3x10   Serratus wall walks 2x10 M/L  2x10 A/P       Standing low row   3x10 yellow tubing  Required constant TC for scap stab  YTB 2x10   Ther Ex        SL ER        Sup cane flex 2x10 2x10      Sup cane ER   3x10     Pendulums        Pulleys  Flx&Abd 3 min ea      [place] ER/IR        Upper trap str        ER AROM 2x20 2x20 2x10 yellow tube     UBE    5' 5'   Ther Activity                        Gait Training                        Modalities                    Patient treated by Jackson Montemayor Mikaela, SPT under my direct supervision

## 2021-07-07 ENCOUNTER — EVALUATION (OUTPATIENT)
Dept: PHYSICAL THERAPY | Facility: CLINIC | Age: 71
End: 2021-07-07
Payer: MEDICARE

## 2021-07-07 DIAGNOSIS — M75.82 ROTATOR CUFF TENDONITIS, LEFT: ICD-10-CM

## 2021-07-07 DIAGNOSIS — G89.29 CHRONIC LEFT SHOULDER PAIN: Primary | ICD-10-CM

## 2021-07-07 DIAGNOSIS — M25.512 CHRONIC LEFT SHOULDER PAIN: Primary | ICD-10-CM

## 2021-07-07 PROCEDURE — 97110 THERAPEUTIC EXERCISES: CPT | Performed by: PHYSICAL THERAPIST

## 2021-07-07 PROCEDURE — 97112 NEUROMUSCULAR REEDUCATION: CPT | Performed by: PHYSICAL THERAPIST

## 2021-07-07 NOTE — PROGRESS NOTES
PT Re-Evaluation     Today's date: 2021  Patient name: Wolf Mobley  : 1950  MRN: [id number]  Referring provider: Self, Referral  Dx:   Encounter Diagnosis     ICD-10-CM    1  Chronic left shoulder pain  M25 512     G89 29    2  Rotator cuff tendonitis, left  M75 82                   Assessment  Assessment details: Wolf Mobley has been seen for 11 visits of physical therapy  Wolf Mobley is demonstrating fair progress with improving RTC strength, GH AROM, decreasing pain levels  She continues with overall decreased left RTC weakness, signs of GH impingement and possible RTC tear  These impairments impact her ability to perform activity at and above shoulder height without pain  Patient will continue to benefit from skilled physical therapy to continually address the remaining strength, ROM, proprioceptive deficits to be able to return to iADLs, reaching, recreational activities at [facility]  Impairments: abnormal or restricted ROM, abnormal movement, activity intolerance, impaired physical strength, pain with function, poor posture  and poor body mechanics  Understanding of Dx/Px/POC: good   Prognosis: good    Goals  STG:  to be achieved within 2-4 weeks  1  Pt will demo 50% improvement in shoulder AROM to improve self care and household ADLs - In progress  2  Improve shoulder strength in all deficient planes by 1/2 MMT  - Met  3  Pt will have good understanding of basic HEP  - Met      LTG:  to be achieved within 4-8 weeks  1  Improve strength to be able to put dishes into an overhead cabinet  - Not met  2  Pt will be able to sleep without disturbance secondary to shoulder pain  - Met  3  Pt will demo full shoulder AROM to return to household duties  - Not met  4   Pt will be I with comprehensive I HEP  - Not met    Plan  Plan details:  HEP development, stretching, strengthening, A/AA/PROM, joint mobilizations, posture education, STM/MI as needed to reduce muscle tension, muscle reeducation, PLOC discussed and agreed upon with patient  Patient would benefit from: skilled physical therapy  Planned modality interventions: cryotherapy and thermotherapy: hydrocollator packs  Planned therapy interventions: manual therapy, neuromuscular re-education, self care, therapeutic exercise, therapeutic activities, home exercise program, joint mobilization, postural training, patient education, strengthening and stretching  Frequency: 2x week  Duration in weeks: 6  Treatment plan discussed with: patient        Subjective Evaluation    History of Present Illness  Mechanism of injury: Paresh Otto presents to therapy today stating she was busy over the weekend and was not as compliant as usual with her HEP  She noticed increased pain as a result compared to last week  Overall she states she feels "a little bit stronger", improved ability to reach behind her back and to lay on her left side with decreased pain  She does report she continues to avoid movements overhead and carrying weigh due to pain  As per  FOTO pt reports  "Much difficulty" with: Reach a shelf that is at shoulder height;   "Some difficulty" with:  and drink out of a full glass of water  Turn on a faucet in the same direction as your affected arm; Turn on a faucet in the opposite direction as your affect arm  Pain  Current pain ratin  At best pain ratin  At worst pain ratin  Location: Anterior and posterior left shoulder pain  Quality: sharp  Relieving factors: medications    Social Support  Lives with: alone    Employment status: not working  Hand dominance: right      Diagnostic Tests  No diagnostic tests performed  Patient Goals  Patient goals for therapy: increased strength, return to sport/leisure activities, independence with ADLs/IADLs, decreased pain and increased motion          Objective     Palpation   Left   No palpable tenderness to the levator scapulae and pectoralis major     Hypertonic in the latissimus, levator scapulae, pectoralis minor and teres minor  Tenderness of the biceps, infraspinatus, pectoralis minor, supraspinatus and teres minor  Active Range of Motion   Left Shoulder   Flexion: 152 degrees with pain  Abduction: 165 degrees with pain  External rotation BTH: T2 with pain  Internal rotation BTB: T6 with pain    Right Shoulder   Flexion: 167 degrees   Abduction: 180 degrees   External rotation BTH: T3   Internal rotation BTB: T5     Scapular Mobility   Left Shoulder   Scapular Mobility with Shoulder to 90° FF   Scapular elevation: minimal  Upward rotation: premature    Strength/Myotome Testing     Left Shoulder     Planes of Motion   Flexion: 3+ (Pain with all MMT on Left)   Abduction: 3+   External rotation at 0°: 3+   Internal rotation at 0°: 5     Right Shoulder     Planes of Motion   Flexion: 5   Abduction: 4+   External rotation at 0°: 4+   Internal rotation at 0°: 5     Tests     Left Shoulder   Positive drop arm, empty can, Neer's and painful arc         Flowsheet Rows      Most Recent Value   PT/OT G-Codes   Current Score  45   Projected Score  59             Precautions: None  [facility]iated [facility] [employer] [facility]        Manuals 7/7 6/23 6/25 6/30 7/2   PROM  Flx, Abd, ER Flex, abd, ER Flex/scap/ER in 45/abd/ER in 90                            Neuro Re-Ed        Sup scap ret 20x5s  2x10  Standing 1x10     Sup GH alpha        Sup GH stabs        Seated 90/90 ER    2x10 with VCs for scap stab 2x10 with VCs for scap stab   Seated 90/90 IR        Standing flex AROM Flex + scap to 90  10x ea with need for cues for scap retr/dep       SL flexion        SL ER  3x10 ecc MR 2x15 AROM 1x10  AROM with OP 2x10    SL abd  2x10 Attempted 4x but terminated due to pain  10x   Prone scap ret        SL scaption  2x10 with VCs for proper scapular position   AROM 3x10  AROM with OP 1x10, attempted a second set but terminated due to fatigue Standing 3x10   Serratus wall walks Standing low row   3x10 yellow tubing  Required constant TC for scap stab  YTB 2x10   Ther Ex        SL ER 2x10 0lb       Sup cane flex 20x 2x10      Sup cane ER 20x  3x10     Pendulums 30x cw/ccw       Pulleys  Flx&Abd 3 min ea      Dasha ER/IR        Upper trap str        ER AROM Standing 30x 0lb 2x20 2x10 yellow tube     UBE    5' 5'   Ther Activity                        Gait Training                        Modalities

## 2021-07-09 ENCOUNTER — OFFICE VISIT (OUTPATIENT)
Dept: PHYSICAL THERAPY | Facility: CLINIC | Age: 71
End: 2021-07-09
Payer: MEDICARE

## 2021-07-09 DIAGNOSIS — M75.82 ROTATOR CUFF TENDONITIS, LEFT: ICD-10-CM

## 2021-07-09 DIAGNOSIS — G89.29 CHRONIC LEFT SHOULDER PAIN: Primary | ICD-10-CM

## 2021-07-09 DIAGNOSIS — M25.512 CHRONIC LEFT SHOULDER PAIN: Primary | ICD-10-CM

## 2021-07-09 PROCEDURE — 97110 THERAPEUTIC EXERCISES: CPT | Performed by: PHYSICAL THERAPIST

## 2021-07-09 PROCEDURE — 97112 NEUROMUSCULAR REEDUCATION: CPT | Performed by: PHYSICAL THERAPIST

## 2021-07-09 NOTE — PROGRESS NOTES
Daily Note     Today's date: 2021  Patient name: Shamika Mulligan  : 1950  MRN: [id number]  Referring provider: Self, Referral  Dx:   Encounter Diagnosis     ICD-10-CM    1  Chronic left shoulder pain  M25 512     G89 29    2  Rotator cuff tendonitis, left  M75 82                   Subjective: Shamika Mulligan reports her shoulder is doing ok today but still has pain along the front when attempting to raise overhead  Objective: See treatment diary below      Assessment: Tolerated increased strengthening activity well with pain at end ranges of 90/90 ER but decreased with limiting ROM during exercise  Patient demonstrated fatigue post treatment and continued cues throughout session for scapular positioning and proper recruitment of posterior cuff  Pt's significant RTC weakness and poor SH rhythm continue to lead to signs of subacromial impingement with functional reaching with left UE  Plan: Continue per plan of care  Focus on improving RTC strength and SH rhythm with elevation of arm       Precautions: None  Initiated HEP as per [employer] access code: [facility]        Manuals    PROM   Flex, abd, ER Flex/scap/ER in 45/abd/ER in 90                            Neuro Re-Ed        Sup scap ret 20x5s  2x10  Standing 1x10     Sup GH alpha        Sup GH stabs        Seated 90/90 ER  Prone 90/90 ER 2x10 5s  2x10 with VCs for scap stab 2x10 with VCs for scap stab   Serratus wall walk  horiz + vertical 10x ea B      Standing flex AROM Flex + scap to 90  10x ea with need for cues for scap retr/dep       SL flexion        SL ER   2x15 AROM 1x10  AROM with OP 2x10    SL abd   Attempted 4x but terminated due to pain  10x   Prone horizontal abd in ER  2x10      SL scaption    AROM 3x10  AROM with OP 1x10, attempted a second set but terminated due to fatigue Standing 3x10   Standing low row   3x10 yellow tubing  Required constant TC for scap stab  YTB 2x10   Ther Ex        SL ER 2x10 0lb Sup cane flex 20x       Sup cane ER 20x  3x10     Pendulums 30x cw/ccw       Door pec str  3x30s      Tube ER + IR  3x10 ea  Yellow  Blue        Behind Back IR str  3x30s      ER AROM Standing 30x 0lb  2x10 yellow tube     UBE    5' 5'   Ther Activity                        Gait Training                        Modalities

## 2021-07-14 ENCOUNTER — OFFICE VISIT (OUTPATIENT)
Dept: PHYSICAL THERAPY | Facility: CLINIC | Age: 71
End: 2021-07-14
Payer: MEDICARE

## 2021-07-14 DIAGNOSIS — G89.29 CHRONIC LEFT SHOULDER PAIN: Primary | ICD-10-CM

## 2021-07-14 DIAGNOSIS — M25.512 CHRONIC LEFT SHOULDER PAIN: Primary | ICD-10-CM

## 2021-07-14 DIAGNOSIS — M75.82 ROTATOR CUFF TENDONITIS, LEFT: ICD-10-CM

## 2021-07-14 PROCEDURE — 97110 THERAPEUTIC EXERCISES: CPT | Performed by: PHYSICAL THERAPIST

## 2021-07-14 PROCEDURE — 97112 NEUROMUSCULAR REEDUCATION: CPT | Performed by: PHYSICAL THERAPIST

## 2021-07-14 NOTE — PROGRESS NOTES
Daily Note     Today's date: 2021  Patient name: Amanda Aaron  : 1950  MRN: [id number]  Referring provider: Vinh Gaxiola MD  Dx:   Encounter Diagnosis     ICD-10-CM    1  Chronic left shoulder pain  M25 512     G89 29    2  Rotator cuff tendonitis, left  M75 82                   Subjective: Amanda Aaron reports her arm was sore from exercise after last session but no increase in pain  She states she has been able to raise her arm overhead easier  Objective: See treatment diary below      Assessment: Tolerated treatment well  Patient tolerated progression of strengthening well with improving ability to recruit posterior cuff with cues from PT  She however continued with significant RTC weakness impacting ability to complete activities in gravity dependant positions requiring RTC stabilization  Plan: Continue per plan of care        Precautions: None  Initiated HEP as per [employer] access code: [facility]        Manuals  7   PROM    Flex/scap/ER in 45/abd/ER in 90                            Neuro Re-Ed        Sup scap ret 20x5s       Ball wall cw/ccw   90 only 30x/15x ea     Sup GH stabs        Seated 90/90 ER  Prone 90/90 ER 2x10 5s Seated ER 3x10 0lb  IR 10x Green TB  Tactile cues throughout 2x10 with VCs for scap stab 2x10 with VCs for scap stab   Serratus wall walk  horiz + vertical 10x ea B      Standing flex AROM Flex + scap to 90  10x ea with need for cues for scap retr/dep       0 abd ER iso walk outs   3x5 yellow tube     SL ER    AROM 1x10  AROM with OP 2x10    SL abd     10x   Prone horizontal abd in ER  2x10      SL scaption    AROM 3x10  AROM with OP 1x10, attempted a second set but terminated due to fatigue Standing 3x10   Standing low row     YTB 2x10   Ther Ex        SL ER 2x10 0lb       Sup cane flex 20x       Sup cane ER 20x       Pendulums 30x cw/ccw       Door pec str  3x30s      Tube ER + IR  3x10 ea  Yellow  Blue        Behind Back IR str 3x30s 3x30s     ER AROM Standing 30x 0lb       UBE   5' 5' 5'   Ther Activity                        Gait Training                        Modalities

## 2021-07-16 ENCOUNTER — OFFICE VISIT (OUTPATIENT)
Dept: PHYSICAL THERAPY | Facility: CLINIC | Age: 71
End: 2021-07-16
Payer: MEDICARE

## 2021-07-16 DIAGNOSIS — M25.512 CHRONIC LEFT SHOULDER PAIN: Primary | ICD-10-CM

## 2021-07-16 DIAGNOSIS — G89.29 CHRONIC LEFT SHOULDER PAIN: Primary | ICD-10-CM

## 2021-07-16 DIAGNOSIS — M75.82 ROTATOR CUFF TENDONITIS, LEFT: ICD-10-CM

## 2021-07-16 PROCEDURE — 97112 NEUROMUSCULAR REEDUCATION: CPT | Performed by: PHYSICAL THERAPIST

## 2021-07-16 PROCEDURE — 97110 THERAPEUTIC EXERCISES: CPT | Performed by: PHYSICAL THERAPIST

## 2021-07-16 NOTE — PROGRESS NOTES
Daily Note     Today's date: 2021  Patient name: Paresh Otto  : 1950  MRN: [id number]  Referring provider: Jayson Dumont MD  Dx:   Encounter Diagnosis     ICD-10-CM    1  Chronic left shoulder pain  M25 512     G89 29    2  Rotator cuff tendonitis, left  M75 82                   Subjective: Paresh Otto reports her shoulder felt tired after last session but improved the next day  She states she continues to feel stronger  Objective: See treatment diary below      Assessment: Tolerated treatment well  Patient demonstrated fatigue post treatment, low RTC strength leading to impingement and pain with active flexion > shoulder height against gravity  She does demo slow strength gains and ability to self correct scapular position to decrease signs of impingement to 90 flexion  Plan: Continue per plan of care  Focus on improving RTC strength and ability to reach and lift pain free overhead       Precautions: None  Initiated HEP as per [employer] access code: [facility]        Manuals  7   PROM                                Neuro Re-Ed        Sup scap ret 20x5s       Ball wall cw/ccw   90 only 30x/15x ea 90 only 30x->15x    Sup GH stabs        Seated 90/90 ER  Prone 90/90 ER 2x10 5s Seated ER 3x10 0lb  IR 10x Green TB  Tactile cues throughout  2x10 with VCs for scap stab   Serratus wall walk  horiz + vertical 10x ea B      Standing flex AROM Flex + scap to 90  10x ea with need for cues for scap retr/dep       0 abd ER iso walk outs   3x5 yellow tube     Punch to ceiling then eccentric lower in scaption    10x w/PT assist    SL abd     10x   Prone horizontal abd in ER  2x10  Prone horiz abd 10x  Prone scaption 10x  Prone flexion 10x    SL scaption     Standing 3x10   Standing low row     YTB 2x10   ecceentric ER at side    Eccentric ER 3x10 manual resistance by PT    Ther Ex        SL ER 2x10 0lb       Sup cane flex 20x   Sup gh flex to full 2x10    Sup cane ER 20x Pendulums 30x cw/ccw       Door pec str  3x30s      Tube ER + IR  3x10 ea  Yellow  Blue        Behind Back IR str  3x30s 3x30s     ER AROM Standing 30x 0lb       UBE   5' 5' L1 5'   Ther Activity                        Gait Training                        Modalities

## 2021-07-21 ENCOUNTER — OFFICE VISIT (OUTPATIENT)
Dept: PHYSICAL THERAPY | Facility: CLINIC | Age: 71
End: 2021-07-21
Payer: MEDICARE

## 2021-07-21 DIAGNOSIS — M75.82 ROTATOR CUFF TENDONITIS, LEFT: ICD-10-CM

## 2021-07-21 DIAGNOSIS — M25.512 CHRONIC LEFT SHOULDER PAIN: Primary | ICD-10-CM

## 2021-07-21 DIAGNOSIS — G89.29 CHRONIC LEFT SHOULDER PAIN: Primary | ICD-10-CM

## 2021-07-21 PROCEDURE — 97112 NEUROMUSCULAR REEDUCATION: CPT | Performed by: PHYSICAL THERAPIST

## 2021-07-21 PROCEDURE — 97110 THERAPEUTIC EXERCISES: CPT | Performed by: PHYSICAL THERAPIST

## 2021-07-21 NOTE — PROGRESS NOTES
Daily Note     Today's date: 2021  Patient name: Katelynn Lopez  : 1950  MRN: [id number]  Referring provider: Génesis Zacarias MD  Dx:   Encounter Diagnosis     ICD-10-CM    1  Chronic left shoulder pain  M25 512     G89 29    2  Rotator cuff tendonitis, left  M75 82                   Subjective: Katelynn Lopez reports she tried doing her ball on wall exercise at home but was unable to do it without pain  She continues to [facility] to hospitals[facility] but is unable to use any resistance with her right arm and has difficulty trying to raise her arm overhead without pain  Objective: See treatment diary below      Assessment: Tolerated treatment well  Patient continues with significant RTC weakness and poor scapular stability with need for cues for proper positioning  Plan: Continue per plan of care        Precautions: None  Initiated HEP as per [employer] access code: [facility]        Manuals    PROM     Abduction + ER in 90 abd           Neuro Re-Ed        Sup scap ret 20x5s       Ball wall cw/ccw   90 only 30x/15x ea 90 only 30x->15x 90 only 30x->15x   Sup GH stabs        Seated 90/90 ER  Prone 90/90 ER 2x10 5s Seated ER 3x10 0lb  IR 10x Green TB  Tactile cues throughout     Serratus wall walk  horiz + vertical 10x ea B      Standing flex AROM Flex + scap to 90  10x ea with need for cues for scap retr/dep       0 abd ER iso walk outs   3x5 yellow tube     Punch to ceiling then eccentric lower in scaption    10x w/PT assist    Prone horizontal abd in ER  2x10  Prone horiz abd 10x  Prone scaption 10x  Prone flexion 10x Prone horiz abd 10x  Prone scaption 10x  Prone flexion 10x  5s each   Standing low row        ecceentric ER at side    Eccentric ER 3x10 manual resistance by PT Standing 2x10   Ther Ex        SL ER 2x10 0lb       Sup cane flex 20x   Sup gh flex to full 2x10    Sup cane ER 20x       Pendulums 30x cw/ccw       Door pec str  3x30s      Tube ER + IR  3x10 ea  Yellow  Blue Behind Back IR str  3x30s 3x30s     ER AROM Standing 30x 0lb       UBE   5' 5' L1 5' L1   SL gh abd     2x10   Ther Activity                        Gait Training                        Modalities

## 2021-07-23 ENCOUNTER — OFFICE VISIT (OUTPATIENT)
Dept: PHYSICAL THERAPY | Facility: CLINIC | Age: 71
End: 2021-07-23
Payer: MEDICARE

## 2021-07-23 DIAGNOSIS — M75.82 ROTATOR CUFF TENDONITIS, LEFT: ICD-10-CM

## 2021-07-23 DIAGNOSIS — M25.512 CHRONIC LEFT SHOULDER PAIN: Primary | ICD-10-CM

## 2021-07-23 DIAGNOSIS — G89.29 CHRONIC LEFT SHOULDER PAIN: Primary | ICD-10-CM

## 2021-07-23 PROCEDURE — 97112 NEUROMUSCULAR REEDUCATION: CPT | Performed by: PHYSICAL THERAPIST

## 2021-07-23 NOTE — PROGRESS NOTES
Daily Note     Today's date: 2021  Patient name: Katelynn Lopez  : 1950  MRN: [id number]  Referring provider: Génesis Zacarias MD  Dx:   Encounter Diagnosis     ICD-10-CM    1  Chronic left shoulder pain  M25 512     G89 29    2  Rotator cuff tendonitis, left  M75 82                   Subjective: Katelynn Lopez reported experiencing some soreness in her L shoulder since her previous session  She stated that she underwent a procedure the day before and was careful to protect her L shoulder prior and following  She reported some mild soreness throughout the session but no instances of sharp pain  At the conclusion of the session, Katelynn Lopez reported that her L shoulder was "tired "      Objective: See treatment diary below      Assessment: Tolerated treatment well  Patient exhibited good technique with therapeutic exercises and required consistent cueing throughout the session to maintain proper scapular positioning to prevent pain and compensation  When cued, patient demonstrated excellent neuromuscular control and stabilization  Patient demonstrated limited but progressively improving global shoulder strength  Plan: Continue per plan of care        Precautions: None  Initiated HEP as per [email or web address] access code: [facility]        Manuals    PROM ER in 90 abd    Abduction + ER in 90 abd           Neuro Re-Ed        Sup scap ret        Ball wall cw/ccw 2x30 90 only  3 rolls, reset, alternate cw/ccw  90 only 30x/15x ea 90 only 30x->15x 90 only 30x->15x   Sup GH stabs        Seated 90/90 ER  Prone 90/90 ER 2x10 5s Seated ER 3x10 0lb  IR 10x Green TB  Tactile cues throughout     Serratus wall walk  horiz + vertical 10x ea B      Standing flex AROM To 90 10x        0 abd ER iso walk outs   3x5 yellow tube     Punch to ceiling then eccentric lower in scaption    10x w/PT assist    Prone horizontal abd in ER Prone flexion 10x  Prone scap 10x  Prone horiz abd 10x 2x10  Prone horiz abd 10x  Prone scaption 10x  Prone flexion 10x Prone horiz abd 10x  Prone scaption 10x  Prone flexion 10x  5s each   Standing low row        ecceentric ER at side    Eccentric ER 3x10 manual resistance by PT Standing 2x10   Ther Ex        SL ER        Sup cane flex    Sup gh flex to full 2x10    Sup cane ER        Pendulums Intermittent throughout session for relief        Door pec str  3x30s      Tube ER + IR  3x10 ea  Yellow  Blue        Behind Back IR str  3x30s 3x30s     ER AROM        UBE   5' 5' L1 5' L1   SL gh abd 2x10    2x10   Ther Activity                        Gait Training                        Modalities                    Patient treated by Luke Waller SPT under my direct supervision

## 2021-07-28 ENCOUNTER — OFFICE VISIT (OUTPATIENT)
Dept: PHYSICAL THERAPY | Facility: CLINIC | Age: 71
End: 2021-07-28
Payer: MEDICARE

## 2021-07-28 DIAGNOSIS — M25.512 CHRONIC LEFT SHOULDER PAIN: Primary | ICD-10-CM

## 2021-07-28 DIAGNOSIS — G89.29 CHRONIC LEFT SHOULDER PAIN: Primary | ICD-10-CM

## 2021-07-28 DIAGNOSIS — M75.82 ROTATOR CUFF TENDONITIS, LEFT: ICD-10-CM

## 2021-07-28 PROCEDURE — 97112 NEUROMUSCULAR REEDUCATION: CPT | Performed by: PHYSICAL THERAPIST

## 2021-07-28 NOTE — PROGRESS NOTES
Daily Note     Today's date: 2021  Patient name: Shamika Mulligan  : 1950  MRN: [id number]  Referring provider: Lynnette Rodriguez MD  Dx:   Encounter Diagnosis     ICD-10-CM    1  Chronic left shoulder pain  M25 512     G89 29    2  Rotator cuff tendonitis, left  M75 82                   Subjective: Shamika Mulligan reported feeling sore prior to her session  She stated that she attended piliates the day before and modified some of the exercises to fit her needs  Objective: See treatment diary below      Assessment: Tolerated treatment well  Patient demonstrated improving technique with therapeutic exercise with intermittent cueing throughout the session to maintain proper scapular position  Patient experienced infrequent instances of pain throughout the session and reported that she felt tired at the conclusion of the session  Patient would continue to benefit from OPPT in order to address remaining strength deficits  Plan: Continue per plan of care        Precautions: None  Initiated HEP as per [employer] access code: [facility]        Manuals    PROM ER in 90 abd Flex/ER   Abduction + ER in 90 abd           Neuro Re-Ed        Sup scap ret        Ball wall cw/ccw 2x30 90 only  3 rolls, reset, alternate cw/ccw 2x30 90 only  3 rolls, reset, alternate cw/ccw 90 only 30x/15x ea 90 only 30x->15x 90 only 30x->15x   Sup GH stabs        Seated 90/90 ER  Seated ER 1x10  1x10 with elbow lift Seated ER 3x10 0lb  IR 10x Green TB  Tactile cues throughout     Serratus wall walk        Standing flex AROM To 90 10x        0 abd ER iso walk outs  2x10 yellow  2x10 IR yellow 3x5 yellow tube     Punch to ceiling then eccentric lower in scaption    10x w/PT assist    Prone horizontal abd in ER Prone flexion 10x  Prone scap 10x  Prone horiz abd 10x   Prone horiz abd 10x  Prone scaption 10x  Prone flexion 10x Prone horiz abd 10x  Prone scaption 10x  Prone flexion 10x  5s each   Standing low row        Ecceentric ER at side    Eccentric ER 3x10 manual resistance by PT Standing 2x10   Seated scap  Resting on table to 110 active 10x  110 active with horiz add 5x      Ther Ex        SL ER        Sup cane flex  Sup gh flex to full 2x10  Sup gh flex to full 2x10    Sup cane ER        Pendulums Intermittent throughout session for relief  2x30 ea cw/ccw      Door pec str        Tube ER + IR        Behind Back IR str   3x30s     ER AROM        UBE   5' 5' L1 5' L1   SL gh abd 2x10 2x10   2x10   Ther Activity                        Gait Training                        Modalities                    Patient treated by Luke Waller SPT under my direct supervision

## 2021-07-30 ENCOUNTER — OFFICE VISIT (OUTPATIENT)
Dept: PHYSICAL THERAPY | Facility: CLINIC | Age: 71
End: 2021-07-30
Payer: MEDICARE

## 2021-07-30 DIAGNOSIS — M75.82 ROTATOR CUFF TENDONITIS, LEFT: ICD-10-CM

## 2021-07-30 DIAGNOSIS — G89.29 CHRONIC LEFT SHOULDER PAIN: Primary | ICD-10-CM

## 2021-07-30 DIAGNOSIS — M25.512 CHRONIC LEFT SHOULDER PAIN: Primary | ICD-10-CM

## 2021-07-30 PROCEDURE — 97110 THERAPEUTIC EXERCISES: CPT | Performed by: PHYSICAL THERAPIST

## 2021-07-30 PROCEDURE — 97112 NEUROMUSCULAR REEDUCATION: CPT | Performed by: PHYSICAL THERAPIST

## 2021-07-30 NOTE — PROGRESS NOTES
Daily Note     Today's date: 2021  Patient name: Wendie Cushing  : 1950  MRN: [id number]  Referring provider: Feliberto Henry MD  Dx:   Encounter Diagnosis     ICD-10-CM    1  Chronic left shoulder pain  M25 512     G89 29    2  Rotator cuff tendonitis, left  M75 82                   Subjective: Wendie Cushing reported some soreness prior to her session  She stated that she had a busy day before and was experiencing some soreness and pain with overhead activities  She continued to report fluctuating pain levels throughout the session in addition to general fatigue of her L shoulder  Objective: See treatment diary below      Assessment: Tolerated treatment well  Patient required frequent verbal and tactile cueing throughout the session to maintain proper scapular positioning  When cued, patient experienced minimal pain levels with overhead and endurance based activities  Once patient began compensating, she began feeling pain with each overhead motion  Maintaining proper scapular stabilization frequently fatigued patient throughout session  Patient would continue to benefit from OPPT to address remaining L shoulder strength and endurance deficits  Plan: Continue per plan of care        Precautions: None  [facility]iated [facility] [employer] [facility]        Manuals    PROM ER in 90 abd Flex/ER Flex/ER in 90 abd  Abduction + ER in 90 abd           Neuro Re-Ed        Sup scap ret        Ball wall cw/ccw 2x30 90 only  3 rolls, reset, alternate cw/ccw 2x30 90 only  3 rolls, reset, alternate cw/ccw 30x 90 only  3 rolls, reset, alternate cw/ccw 90 only 30x->15x 90 only 30x->15x   Sup GH stabs        Seated 90/90 ER  Seated ER 1x10  1x10 with elbow lift      Serratus wall walk        Standing flex AROM To 90 10x        0 abd ER iso walk outs  2x10 yellow  2x10 IR yellow      Punch to ceiling then eccentric lower in scaption    10x w/PT assist    Prone horizontal abd in ER Prone flexion 10x  Prone scap 10x  Prone horiz abd 10x   Prone horiz abd 10x  Prone scaption 10x  Prone flexion 10x Prone horiz abd 10x  Prone scaption 10x  Prone flexion 10x  5s each   Standing low row        Ecceentric ER at side    Eccentric ER 3x10 manual resistance by PT Standing 2x10   Seated scap  Resting on table to 110 active 10x  110 active with horiz add 5x Resting on table to 110 active 15x  110 active with horiz add 10x     Ther Ex        SL ER   15x     Sup cane flex  Sup gh flex to full 2x10  Sup gh flex to full 2x10    Sup cane ER        Pendulums Intermittent throughout session for relief  2x30 ea cw/ccw 2x20 ea cw/ccw     Door pec str        Tube ER + IR        Behind Back IR str        ER AROM        UBE   5min L1 5' L1 5' L1   SL gh abd 2x10 2x10 15x  2x10   Ther Activity                        Gait Training                        Modalities                    Patient treated by Amina Iraheta, SPT under my direct supervision

## 2021-08-04 ENCOUNTER — OFFICE VISIT (OUTPATIENT)
Dept: PHYSICAL THERAPY | Facility: CLINIC | Age: 71
End: 2021-08-04
Payer: MEDICARE

## 2021-08-04 DIAGNOSIS — G89.29 CHRONIC LEFT SHOULDER PAIN: Primary | ICD-10-CM

## 2021-08-04 DIAGNOSIS — M75.82 ROTATOR CUFF TENDONITIS, LEFT: ICD-10-CM

## 2021-08-04 DIAGNOSIS — M25.512 CHRONIC LEFT SHOULDER PAIN: Primary | ICD-10-CM

## 2021-08-04 PROCEDURE — 97112 NEUROMUSCULAR REEDUCATION: CPT | Performed by: PHYSICAL THERAPIST

## 2021-08-04 NOTE — PROGRESS NOTES
Daily Note     Today's date: 2021  Patient name: Amanda Aaron  : 1950  MRN: [id number]  Referring provider: Vinh Gaxiola MD  Dx:   Encounter Diagnosis     ICD-10-CM    1  Chronic left shoulder pain  M25 512     G89 29    2  Rotator cuff tendonitis, left  M75 82                   Subjective: Amanda Aaron reported soreness and continued difficulty with ADLs  She reported that she requires BL UE support to lift dishes beyond shoulder height and into a cabinet  She confirmed compliance with HEP, and stated that some of the exercises also make her sore  Objective: See treatment diary below      Assessment: Tolerated treatment well  Patient demonstrated fatigue post treatment and exhibited good technique with therapeutic exercises  Patient required minimal cueing throughout session to maintain proper scapular positioning with therapeutic exercises  Patient was not progressed in therapeutic exercise due to high irritability levels with more challenging activity  Patient would continue to benefit from OPPT in order to address remaining strength and ROM deficits in addition to fluctuating pain levels  Plan: Continue per plan of care        Precautions: None  Initiated HEP as per [employer] access code: [email or web address]        Manuals    PROM ER in 90 abd Flex/ER Flex/ER in 90 abd  Abduction + ER in 90 abd           Neuro Re-Ed        Sup scap ret        Ball wall cw/ccw 2x30 90 only  3 rolls, reset, alternate cw/ccw 2x30 90 only  3 rolls, reset, alternate cw/ccw 30x 90 only  3 rolls, reset, alternate cw/ccw 40x 90 only  10 rolls, reset, alternate cw/ccw 90 only 30x->15x   Sup GH stabs        Seated 90/90 ER  Seated ER 1x10  1x10 with elbow lift  Seated ER  2x10  10x w elbow lift    Serratus wall walk        Standing flex AROM To 90 10x        0 abd ER iso walk outs  2x10 yellow  2x10 IR yellow      Punch to ceiling then eccentric lower in scaption        Prone horizontal abd in ER Prone flexion 10x  Prone scap 10x  Prone horiz abd 10x    Prone horiz abd 10x  Prone scaption 10x  Prone flexion 10x  5s each   Standing low row        Ecceentric ER at side     Standing 2x10   Seated scap  Resting on table to 110 active 10x  110 active with horiz add 5x Resting on table to 110 active 15x  110 active with horiz add 10x Resting on table to 120 active 15x  120 active w horiz abd 15x    Ther Ex        SL ER   15x     Sup cane flex  Sup gh flex to full 2x10  SL flex to full 2x10    Sup cane ER        Pendulums Intermittent throughout session for relief  2x30 ea cw/ccw 2x20 ea cw/ccw 2x20 ea cw/ccw    Door pec str        Tube ER + IR        Behind Back IR str        ER AROM        UBE   5min L1 5min L1 5' L1   SL gh abd 2x10 2x10 15x 2x10 2x10   Ther Activity                        Gait Training                        Modalities                    Patient treated by Carolyn Santana, SPT under my direct supervision

## 2021-08-06 ENCOUNTER — EVALUATION (OUTPATIENT)
Dept: PHYSICAL THERAPY | Facility: CLINIC | Age: 71
End: 2021-08-06
Payer: MEDICARE

## 2021-08-06 DIAGNOSIS — M75.82 ROTATOR CUFF TENDONITIS, LEFT: ICD-10-CM

## 2021-08-06 DIAGNOSIS — G89.29 CHRONIC LEFT SHOULDER PAIN: Primary | ICD-10-CM

## 2021-08-06 DIAGNOSIS — M25.512 CHRONIC LEFT SHOULDER PAIN: Primary | ICD-10-CM

## 2021-08-06 PROCEDURE — 97110 THERAPEUTIC EXERCISES: CPT | Performed by: PHYSICAL THERAPIST

## 2021-08-06 NOTE — LETTER
2021    Jacqueline Espinal MD  [address]    Patient: Xochitl Amin   YOB: 1950   Date of Visit: 2021     Encounter Diagnosis     ICD-10-CM    1  Chronic left shoulder pain  M25 512     G89 29    2  Rotator cuff tendonitis, left  M75 82        Dear Dr Anne Turner: Thank you for your recent referral of Xochitl Amin  Please review the attached evaluation summary from [facility]'s recent visit  Please verify that you agree with the plan of care by signing the attached order  If you have any questions or concerns, please do not hesitate to call  I sincerely appreciate the opportunity to share in the care of one of your patients and hope to have another opportunity to work with you in the near future  Sincerely,    Kieran Mayo, PT      Referring Provider:      I certify that I have read the below Plan of Care and certify the need for these services furnished under this plan of treatment while under my care  Jacqueline Espinal MD  [address]  Via Fax: [phone]          PT Re-Evaluation     Today's date: 2021  Patient name: Xochitl Amin  : 1950  MRN: [id number]  Referring provider: Eugenio Rojas MD  Dx:   Encounter Diagnosis     ICD-10-CM    1  Chronic left shoulder pain  M25 512     G89 29    2  Rotator cuff tendonitis, left  M75 82                   Assessment  Assessment details: Xochitl Amin has been seen for 19 visits of physical therapy  Xochitl Amin is demonstrating fair progress with slowly improving RTC strength, GH AROM, decreasing pain levels  She continues with overall decreased left RTC weakness, signs of GH impingement and possible RTC tear  These impairments impact her ability to perform activity at and above shoulder height without pain   Patient was instructed to contact orthopedist concerning rotator cuff repair as she continues to demonstrate limited and minimally improving L RTC strength  Patient will continue to benefit from skilled physical therapy to continually address the remaining strength, ROM, proprioceptive deficits to be able to return to iADLs, reaching, recreational activities at [facility]  Impairments: abnormal or restricted ROM, abnormal movement, activity intolerance, impaired physical strength, pain with function, poor posture  and poor body mechanics  Understanding of Dx/Px/POC: good   Prognosis: good    Goals  STG:  to be achieved within 2-4 weeks  1  Pt will demo 50% improvement in shoulder AROM to improve self care and household ADLs - In progress  2  Improve shoulder strength in all deficient planes by 1/2 MMT  - Met  3  Pt will have good understanding of basic HEP  - Met      LTG:  to be achieved within 4-8 weeks  1  Improve strength to be able to put dishes into an overhead cabinet  - In progress  2  Pt will be able to sleep without disturbance secondary to shoulder pain  - Met  3  Pt will demo full shoulder AROM to return to household duties  - Not met  4  Pt will be I with comprehensive I HEP  - Not met    Plan  Plan details:  HEP development, stretching, strengthening, A/AA/PROM, joint mobilizations, posture education, STM/MI as needed to reduce muscle tension, muscle reeducation, PLOC discussed and agreed upon with patient        Patient would benefit from: skilled physical therapy  Planned modality interventions: cryotherapy and thermotherapy: hydrocollator packs  Planned therapy interventions: manual therapy, neuromuscular re-education, self care, therapeutic exercise, therapeutic activities, home exercise program, joint mobilization, postural training, patient education, strengthening and stretching  Frequency: 2x week  Duration in weeks: 6  Treatment plan discussed with: patient        Subjective Evaluation    History of Present Illness  Mechanism of injury: Katelynn Lopez reports feeling overall "much better" following her previous re-evaluation  She reports that she feels like she has improved 60% since beginning OPPT for her L shoulder  She reports that the remaining 40% is from her lack of strength and pain she experiences while reaching overhead  When reaching overhead, she experiences most of her pain in her anterior shoulder, with a "pull" through her biceps  She reports that reaching behind her back has become easier, but she still experiences pain when she does it  She also reports that she is able to fall asleep on her L shoulder, but she will wake up with pain  She also states that she has been consistnetly compliant with HEP  Pain  Current pain ratin  At best pain ratin  At worst pain rating: 3  Location: anterior shoulder pain  Quality: sharp, dull ache and tight  Relieving factors: medications  Aggravating factors: overhead activity and lifting    Social Support  Lives with: alone    Employment status: not working  Hand dominance: right      Diagnostic Tests  No diagnostic tests performed  Patient Goals  Patient goals for therapy: increased strength, return to sport/leisure activities, independence with ADLs/IADLs, decreased pain and increased motion          Objective     Palpation   Left   Hypertonic in the pectoralis major and pectoralis minor  Tenderness of the biceps, infraspinatus, pectoralis major, pectoralis minor, supraspinatus and teres minor  Tenderness     Left Shoulder   Tenderness in the [facility] joint, biceps tendon (proximal), infraspinatus tendon and supraspinatus tendon       Active Range of Motion   Left Shoulder   Flexion: 160 degrees with pain  Abduction: 91 degrees with pain  External rotation BTH: T3 with pain  Internal rotation BTB: T11 with pain    Right Shoulder   Flexion: 165 degrees   Abduction: 180 degrees   External rotation BTH: T4   Internal rotation BTB: T6     Passive Range of Motion   Left Shoulder   Flexion: 150 degrees with pain  Abduction: 108 degrees with pain  External rotation 90°: 48 degrees with pain  Internal rotation 90°: 48 degrees with pain    Right Shoulder   Flexion: 170 degrees   Abduction: 180 degrees   External rotation 90°: 75 degrees   Internal rotation 90°: 55 degrees     Strength/Myotome Testing     Left Shoulder     Planes of Motion   Flexion: 3+ (Pain with all MMT on Left)   Abduction: 3+   External rotation at 0°: 4   Internal rotation at 0°: 5     Right Shoulder     Planes of Motion   Flexion: 5   Abduction: 4   External rotation at 90°: 4   Internal rotation at 90°: 5              Precautions: None  Initiated HEP as per [employer] access code: [email or web address]    Pt educated in possibility of RTC tear and urged to make appointment with orthopedist   Discussion regarding conservative vs operative treatments for RTC, what recovery from RTC repair entails in regards to timeline, restrictions and rehab              Manuals 7/23 7/28 7/30 8/4 8/6   PROM ER in 90 abd Flex/ER Flex/ER in 90 abd             Neuro Re-Ed        Sup scap ret        Ball wall cw/ccw 2x30 90 only  3 rolls, reset, alternate cw/ccw 2x30 90 only  3 rolls, reset, alternate cw/ccw 30x 90 only  3 rolls, reset, alternate cw/ccw 40x 90 only  10 rolls, reset, alternate cw/ccw    Sup GH stabs        Seated 90/90 ER  Seated ER 1x10  1x10 with elbow lift  Seated ER  2x10  10x w elbow lift    Serratus wall walk        Standing flex AROM To 90 10x        0 abd ER iso walk outs  2x10 yellow  2x10 IR yellow      Punch to ceiling then eccentric lower in scaption        Prone horizontal abd in ER Prone flexion 10x  Prone scap 10x  Prone horiz abd 10x       Standing low row        Ecceentric ER at side        Seated scap  Resting on table to 110 active 10x  110 active with horiz add 5x Resting on table to 110 active 15x  110 active with horiz add 10x Resting on table to 120 active 15x  120 active w horiz abd 15x    Ther Ex        SL ER   15x     Sup cane flex  Sup gh flex to full 2x10  SL flex to full 2x10    Sup cane ER Pendulums Intermittent throughout session for relief  2x30 ea cw/ccw 2x20 ea cw/ccw 2x20 ea cw/ccw    Door pec str        Tube ER + IR        Behind Back IR str        ER AROM        UBE   5min L1 5min L1    SL gh abd 2x10 2x10 15x 2x10    Education     PT   Ther Activity                        Gait Training                        Modalities                    Patient treated by Paige Duvall, SPT under my direct supervision

## 2021-08-06 NOTE — PROGRESS NOTES
PT Re-Evaluation     Today's date: 2021  Patient name: Paresh Otto  : 1950  MRN: [id number]  Referring provider: Jayson Dumont MD  Dx:   Encounter Diagnosis     ICD-10-CM    1  Chronic left shoulder pain  M25 512     G89 29    2  Rotator cuff tendonitis, left  M75 82                   Assessment  Assessment details: Paresh Otto has been seen for 19 visits of physical therapy  Paresh Otto is demonstrating fair progress with slowly improving RTC strength, GH AROM, decreasing pain levels  She continues with overall decreased left RTC weakness, signs of GH impingement and possible RTC tear  These impairments impact her ability to perform activity at and above shoulder height without pain  Patient was instructed to contact orthopedist concerning rotator cuff repair as she continues to demonstrate limited and minimally improving L RTC strength  Patient will continue to benefit from skilled physical therapy to continually address the remaining strength, ROM, proprioceptive deficits to be able to return to iADLs, reaching, recreational activities at [facility]  Impairments: abnormal or restricted ROM, abnormal movement, activity intolerance, impaired physical strength, pain with function, poor posture  and poor body mechanics  Understanding of Dx/Px/POC: good   Prognosis: good    Goals  STG:  to be achieved within 2-4 weeks  1  Pt will demo 50% improvement in shoulder AROM to improve self care and household ADLs - In progress  2  Improve shoulder strength in all deficient planes by 1/2 MMT  - Met  3  Pt will have good understanding of basic HEP  - Met      LTG:  to be achieved within 4-8 weeks  1  Improve strength to be able to put dishes into an overhead cabinet  - In progress  2  Pt will be able to sleep without disturbance secondary to shoulder pain  - Met  3  Pt will demo full shoulder AROM to return to household duties  - Not met  4   Pt will be I with comprehensive I HEP  - Not met    Plan  Plan details:  HEP development, stretching, strengthening, A/AA/PROM, joint mobilizations, posture education, STM/MI as needed to reduce muscle tension, muscle reeducation, PLOC discussed and agreed upon with patient  Patient would benefit from: skilled physical therapy  Planned modality interventions: cryotherapy and thermotherapy: hydrocollator packs  Planned therapy interventions: manual therapy, neuromuscular re-education, self care, therapeutic exercise, therapeutic activities, home exercise program, joint mobilization, postural training, patient education, strengthening and stretching  Frequency: 2x week  Duration in weeks: 6  Treatment plan discussed with: patient        Subjective Evaluation    History of Present Illness  Mechanism of injury: Lane Casas reports feeling overall "much better" following her previous re-evaluation  She reports that she feels like she has improved 60% since beginning OPPT for her L shoulder  She reports that the remaining 40% is from her lack of strength and pain she experiences while reaching overhead  When reaching overhead, she experiences most of her pain in her anterior shoulder, with a "pull" through her biceps  She reports that reaching behind her back has become easier, but she still experiences pain when she does it  She also reports that she is able to fall asleep on her L shoulder, but she will wake up with pain  She also states that she has been consistnetly compliant with HEP    Pain  Current pain ratin  At best pain ratin  At worst pain rating: 3  Location: anterior shoulder pain  Quality: sharp, dull ache and tight  Relieving factors: medications  Aggravating factors: overhead activity and lifting    Social Support  Lives with: alone    Employment status: not working  Hand dominance: right      Diagnostic Tests  No diagnostic tests performed  Patient Goals  Patient goals for therapy: increased strength, return to sport/leisure activities, independence with ADLs/IADLs, decreased pain and increased motion          Objective     Palpation   Left   Hypertonic in the pectoralis major and pectoralis minor  Tenderness of the biceps, infraspinatus, pectoralis major, pectoralis minor, supraspinatus and teres minor  Tenderness     Left Shoulder   Tenderness in the [facility] joint, biceps tendon (proximal), infraspinatus tendon and supraspinatus tendon  Active Range of Motion   Left Shoulder   Flexion: 160 degrees with pain  Abduction: 91 degrees with pain  External rotation BTH: T3 with pain  Internal rotation BTB: T11 with pain    Right Shoulder   Flexion: 165 degrees   Abduction: 180 degrees   External rotation BTH: T4   Internal rotation BTB: T6     Passive Range of Motion   Left Shoulder   Flexion: 150 degrees with pain  Abduction: 108 degrees with pain  External rotation 90°: 48 degrees with pain  Internal rotation 90°: 48 degrees with pain    Right Shoulder   Flexion: 170 degrees   Abduction: 180 degrees   External rotation 90°: 75 degrees   Internal rotation 90°: 55 degrees     Strength/Myotome Testing     Left Shoulder     Planes of Motion   Flexion: 3+ (Pain with all MMT on Left)   Abduction: 3+   External rotation at 0°: 4   Internal rotation at 0°: 5     Right Shoulder     Planes of Motion   Flexion: 5   Abduction: 4   External rotation at 90°: 4   Internal rotation at 90°: 5              Precautions: None  Initiated HEP as per medbridge access code: [email or web address]    Pt educated in possibility of RTC tear and urged to make appointment with orthopedist   Discussion regarding conservative vs operative treatments for RTC, what recovery from RTC repair entails in regards to timeline, restrictions and rehab              Manuals 7/23 7/28 7/30 8/4 8/6   PROM ER in 90 abd Flex/ER Flex/ER in 90 abd             Neuro Re-Ed        Sup scap ret        Ball wall cw/ccw 2x30 90 only  3 rolls, reset, alternate cw/ccw 2x30 90 only  3 rolls, reset, alternate cw/ccw 30x 90 only  3 rolls, reset, alternate cw/ccw 40x 90 only  10 rolls, reset, alternate cw/ccw    Sup GH stabs        Seated 90/90 ER  Seated ER 1x10  1x10 with elbow lift  Seated ER  2x10  10x w elbow lift    Serratus wall walk        Standing flex AROM To 90 10x        0 abd ER iso walk outs  2x10 yellow  2x10 IR yellow      Punch to ceiling then eccentric lower in scaption        Prone horizontal abd in ER Prone flexion 10x  Prone scap 10x  Prone horiz abd 10x       Standing low row        Ecceentric ER at side        Seated scap  Resting on table to 110 active 10x  110 active with horiz add 5x Resting on table to 110 active 15x  110 active with horiz add 10x Resting on table to 120 active 15x  120 active w horiz abd 15x    Ther Ex        SL ER   15x     Sup cane flex  Sup gh flex to full 2x10  SL flex to full 2x10    Sup cane ER        Pendulums Intermittent throughout session for relief  2x30 ea cw/ccw 2x20 ea cw/ccw 2x20 ea cw/ccw    Door pec str        Tube ER + IR        Behind Back IR str        ER AROM        UBE   5min L1 5min L1    SL gh abd 2x10 2x10 15x 2x10    Education     PT   Ther Activity                        Gait Training                        Modalities                    Patient treated by YOLI Bunch under my direct supervision

## 2021-08-11 ENCOUNTER — OFFICE VISIT (OUTPATIENT)
Dept: PHYSICAL THERAPY | Facility: CLINIC | Age: 71
End: 2021-08-11
Payer: MEDICARE

## 2021-08-11 DIAGNOSIS — M75.82 ROTATOR CUFF TENDONITIS, LEFT: ICD-10-CM

## 2021-08-11 DIAGNOSIS — M25.512 CHRONIC LEFT SHOULDER PAIN: Primary | ICD-10-CM

## 2021-08-11 DIAGNOSIS — G89.29 CHRONIC LEFT SHOULDER PAIN: Primary | ICD-10-CM

## 2021-08-11 PROCEDURE — 97112 NEUROMUSCULAR REEDUCATION: CPT | Performed by: PHYSICAL THERAPIST

## 2021-08-11 PROCEDURE — 97110 THERAPEUTIC EXERCISES: CPT | Performed by: PHYSICAL THERAPIST

## 2021-08-11 NOTE — PROGRESS NOTES
Daily Note     Today's date: 2021  Patient name: Amanda Aaron  : 1950  MRN: [id number]  Referring provider: Vinh Gaxiola MD  Dx:   Encounter Diagnosis     ICD-10-CM    1  Chronic left shoulder pain  M25 512     G89 29    2  Rotator cuff tendonitis, left  M75 82                   Subjective: Amanda Aaron reported some soreness prior to her session  She reported that she had lingering pain through the evening following her previous evaluation  She reported that she is seeking an appointment with an orthopedist       Objective: See treatment diary below      Assessment: Tolerated treatment well  Patient demonstrated fatigue post treatment and exhibited good technique with therapeutic exercises  Patient required intermittent cueing with seated supported scaption to maintain proper scapular positioning  Patient additionally required AA during scaption ROM due to weakness and fatigue  Patient would continue to benefit from OPPT in order to address remaining strength and ROM deficits in addition to fluctuating pain levels  Plan: Continue per plan of care        Precautions: None  Initiated HEP as per [employer] access code: [facility] AND [facility]        Manuals    PROM  Flex/ER Flex/ER in 90 abd             Neuro Re-Ed        Sup scap ret        Ball wall cw/ccw 3x15 3 rolls, reset, alternate cw/ccw 2x30 90 only  3 rolls, reset, alternate cw/ccw 30x 90 only  3 rolls, reset, alternate cw/ccw 40x 90 only  10 rolls, reset, alternate cw/ccw    Sup GH stabs        Seated 90/90 ER 3x10 Seated ER 1x10  1x10 with elbow lift  Seated ER  2x10  10x w elbow lift    Serratus wall walk        Standing flex AROM        0 abd ER iso walk outs  2x10 yellow  2x10 IR yellow      Punch to ceiling then eccentric lower in scaption        Prone horizontal abd in ER        Standing low row        Ecceentric ER at side        Seated scap 10x AROM to 110  2x10 AAROM to ~150 focus on eccentrics Resting on table to 110 active 10x  110 active with horiz add 5x Resting on table to 110 active 15x  110 active with horiz add 10x Resting on table to 120 active 15x  120 active w horiz abd 15x    Ther Ex        SL ER 2x10  15x     Sup cane flex  Sup gh flex to full 2x10  SL flex to full 2x10    Sup cane ER        Pendulums 30x ea cw/ccw 2x30 ea cw/ccw 2x20 ea cw/ccw 2x20 ea cw/ccw    Door pec str        Tube ER + IR        Behind Back IR str        ER AROM        UBE   5min L1 5min L1    SL gh abd 2x10 2x10 15x 2x10    Education     PT   Ther Activity                        Gait Training                        Modalities                    Patient treated by Rohini Telles, SPT under my direct supervision

## 2021-08-13 ENCOUNTER — OFFICE VISIT (OUTPATIENT)
Dept: PHYSICAL THERAPY | Facility: CLINIC | Age: 71
End: 2021-08-13
Payer: MEDICARE

## 2021-08-13 DIAGNOSIS — M75.82 ROTATOR CUFF TENDONITIS, LEFT: ICD-10-CM

## 2021-08-13 DIAGNOSIS — M25.512 CHRONIC LEFT SHOULDER PAIN: Primary | ICD-10-CM

## 2021-08-13 DIAGNOSIS — G89.29 CHRONIC LEFT SHOULDER PAIN: Primary | ICD-10-CM

## 2021-08-13 PROCEDURE — 97110 THERAPEUTIC EXERCISES: CPT

## 2021-08-13 PROCEDURE — 97112 NEUROMUSCULAR REEDUCATION: CPT

## 2021-08-13 NOTE — PROGRESS NOTES
Daily Note     Today's date: 2021  Patient name: Vivek Graves  : 1950  MRN: [id number]  Referring provider: Randy Galindo MD  Dx:   Encounter Diagnosis     ICD-10-CM    1  Chronic left shoulder pain  M25 512     G89 29    2  Rotator cuff tendonitis, left  M75 82                    Subjective: Vivek Graves reported some soreness and continued weakness in her L shoulder prior to her session  She reported fatigue throughout the session with fluctuating pain levels  Objective: See treatment diary below      Assessment: Tolerated treatment well  Patient demonstrated fatigue post treatment and exhibited good technique with therapeutic exercises  Patient required intermittent cueing throughout the session to address compensations and proper RTC and scapular recruitment with exercise  Patient reported high pain irritability with isometric ER walkouts on [place]  Patient additionally reported feeling biceps engagement with SL abduction and was cued to stabilize scapular in order to properly utilize RTC  Plan: Continue per plan of care        Precautions: None  Initiated HEP as per [employer] access code: [facility] AND [facility]        Manuals    PROM  Flex/ER in 90 abd Flex/ER in 90 abd             Neuro Re-Ed        Sup scap ret        Ball wall cw/ccw 3x15 3 rolls, reset, alternate cw/ccw 4x15 3 rolls, reset, alternate cw/ccw 30x 90 only  3 rolls, reset, alternate cw/ccw 40x 90 only  10 rolls, reset, alternate cw/ccw    Sup GH stabs        Seated 90/90 ER 3x10   Seated ER  2x10  10x w elbow lift    Serratus wall walk        Standing flex AROM        0 abd ER iso walk outs  10x [place] 2 0  10x [place] 0 2 due to fatigue and pain      Punch to ceiling then eccentric lower in scaption        Prone horizontal abd in ER        Standing low row  2x10 yellow tube      Ecceentric ER at side        Seated scap 10x AROM to 110  2x10 AAROM to ~150 focus on eccentrics  Resting on table to 110 active 15x  110 active with horiz add 10x Resting on table to 120 active 15x  120 active w horiz abd 15x    Ther Ex        SL ER 2x10 2x10 15x     Sup cane flex    SL flex to full 2x10    Sup cane ER        Pendulums 30x ea cw/ccw 30x ea cw/ccw 2x20 ea cw/ccw 2x20 ea cw/ccw    Door pec str  3x30s      Tube ER + IR        Behind Back IR str        ER AROM        UBE  5min L1 5min L1 5min L1    SL gh abd 2x10 2x10 15x 2x10    Education     PT   Ther Activity                        Gait Training                        Modalities                    Patient treated by Carolyn Santana, SPT under my direct supervision

## 2021-08-18 ENCOUNTER — OFFICE VISIT (OUTPATIENT)
Dept: PHYSICAL THERAPY | Facility: CLINIC | Age: 71
End: 2021-08-18
Payer: MEDICARE

## 2021-08-18 DIAGNOSIS — M75.82 ROTATOR CUFF TENDONITIS, LEFT: ICD-10-CM

## 2021-08-18 DIAGNOSIS — G89.29 CHRONIC LEFT SHOULDER PAIN: Primary | ICD-10-CM

## 2021-08-18 DIAGNOSIS — M25.512 CHRONIC LEFT SHOULDER PAIN: Primary | ICD-10-CM

## 2021-08-18 PROCEDURE — 97140 MANUAL THERAPY 1/> REGIONS: CPT

## 2021-08-18 PROCEDURE — 97112 NEUROMUSCULAR REEDUCATION: CPT

## 2021-08-18 PROCEDURE — 97110 THERAPEUTIC EXERCISES: CPT

## 2021-08-18 NOTE — PROGRESS NOTES
Daily Note     Today's date: 2021  Patient name: David Farah  : 1950  MRN: [id number]  Referring provider: Liliana Claire MD  Dx:   Encounter Diagnosis     ICD-10-CM    1  Chronic left shoulder pain  M25 512     G89 29    2  Rotator cuff tendonitis, left  M75 82                   Subjective: Bert Gil reports that her shoulder is a little sore and that lifting overhead is still difficult  Objective: See treatment diary below      Assessment: Tolerated treatment well  Patient demonstrated fatigue post treatment and would benefit from continued PT  She requires frequent verbal and tactile cues for scapular positioning throughout session  She was able to tolerate addition of rhythmic stabilization in supine today with cues for improved positioning  She demonstrates fatigue of scapular stabilizers requiring rest breaks  Plan: Continue per plan of care        Precautions: None  Initiated HEP as per [employer] access code: [facility]        Manuals  8   PROM Flex, abd, ER, IR   Flex/ER in 90 abd     MRE ER and rhythmic stab at 90 MRE ER in scapular plane; rhythmic stab at 90 flex       Neuro Re-Ed        Sup scap ret        Ball wall cw/ccw 3x15 cc/ccw 3x15 3 rolls, reset, alternate cw/ccw 4x15 3 rolls, reset, alternate cw/ccw 40x 90 only  10 rolls, reset, alternate cw/ccw    Sup GH stabs        Seated 90/90 ER  3x10  Seated ER  2x10  10x w elbow lift    Serratus wall walk        Standing flex AROM        0 abd ER iso walk outs 1 0 [place] 3x8 with cues throughout  10x [place] 2 0  10x [place] 0 2 due to fatigue and pain     Punch to ceiling then eccentric lower in scaption        Prone horizontal abd in ER        Standing low row 2x10 yellow   2x10 yellow tube     Lower trap pull Yellow 2x10       Ecceentric ER at side 2x10       Seated scap  10x AROM to 110  2x10 AAROM to ~150 focus on eccentrics  Resting on table to 120 active 15x  120 active w horiz abd 15x    Ther Ex SL ER 3x10 2x10 2x10     Sup cane flex    SL flex to full 2x10    Sup cane ER        Pendulums  30x ea cw/ccw 30x ea cw/ccw 2x20 ea cw/ccw    Door pec str   3x30s     Tube ER + IR        Behind Back IR str        ER AROM        UBE   5min L1 5min L1    SL gh abd  2x10 2x10 2x10    Education     PT   Ther Activity                        Gait Training                        Modalities                    Patient treated by Henrietta Chris, SPT under my direct supervision

## 2021-08-20 ENCOUNTER — OFFICE VISIT (OUTPATIENT)
Dept: PHYSICAL THERAPY | Facility: CLINIC | Age: 71
End: 2021-08-20
Payer: MEDICARE

## 2021-08-20 DIAGNOSIS — M25.512 CHRONIC LEFT SHOULDER PAIN: Primary | ICD-10-CM

## 2021-08-20 DIAGNOSIS — M75.82 ROTATOR CUFF TENDONITIS, LEFT: ICD-10-CM

## 2021-08-20 DIAGNOSIS — G89.29 CHRONIC LEFT SHOULDER PAIN: Primary | ICD-10-CM

## 2021-08-20 PROCEDURE — 97110 THERAPEUTIC EXERCISES: CPT | Performed by: PHYSICAL THERAPIST

## 2021-08-20 PROCEDURE — 97112 NEUROMUSCULAR REEDUCATION: CPT | Performed by: PHYSICAL THERAPIST

## 2021-08-20 NOTE — PROGRESS NOTES
Daily Note     Today's date: 2021  Patient name: Slade Turk  : 1950  MRN: [id number]  Referring provider: Dave Estes MD  Dx:   Encounter Diagnosis     ICD-10-CM    1  Chronic left shoulder pain  M25 512     G89 29    2  Rotator cuff tendonitis, left  M75 82                   Subjective: Slade Turk reports soreness along top of shoulder the next day but recovered quickly after PT  Objective: See treatment diary below      Assessment: Tolerated treatment well  Patient demonstrated fatigue post treatment in posterior cuff and scapular stabilizers  She demod improving ability to actively flex overhead but continues with anterior shoulder pain and forward rounding with eccentric lowering  Plan: Continue per plan of care  Pt to see orthopedist for consult next week  Plan for PT is to begin transition to 1x/wk pending ortho visit results  Precautions: None  Initiated HEP as per [employer] access code: [facility]        Manuals  8   PROM  Flex, abd, ER, IR   Flex/ER in 90 abd    MRE ER and rhythmic stab at 90  MRE ER in scapular plane; rhythmic stab at 90 flex      Neuro Re-Ed        Sup scap ret        Ball wall cw/ccw 90 only 40x ea cw/ccw 3x15 cc/ccw 3x15 3 rolls, reset, alternate cw/ccw 4x15 3 rolls, reset, alternate cw/ccw    Sup GH stabs        Seated 90/90 ER   3x10     Serratus wall walk horiz 10x B  vert 10x B       Standing flex AROM 15x with cues and AA to eccentrically lower         0 abd ER iso walk outs  1 0 [place] 3x8 with cues throughout  10x [place] 2 0  10x [place] 0 2 due to fatigue and pain    Punch to ceiling then eccentric lower in scaption        Prone horizontal abd in ER 3x10  +  2x10 in Y       Standing low row  2x10 yellow   2x10 yellow tube    Lower trap pull  Yellow 2x10      Ecceentric ER at side  2x10      Seated scap   10x AROM to 110  2x10 AAROM to ~150 focus on eccentrics     Ther Ex        SL ER  3x10 2x10 2x10    Sup cane flex Sup cane ER        Pendulums   30x ea cw/ccw 30x ea cw/ccw    Door pec str    3x30s    Tube ER + IR        Behind Back IR str        ER AROM [place] 3x10 0 4       UBE L1 5 min   5min L1    SL gh abd   2x10 2x10    Education     PT   Ther Activity                        Gait Training                        Modalities

## 2021-08-24 ENCOUNTER — OFFICE VISIT (OUTPATIENT)
Dept: OBGYN CLINIC | Facility: CLINIC | Age: 71
End: 2021-08-24
Payer: MEDICARE

## 2021-08-24 ENCOUNTER — APPOINTMENT (OUTPATIENT)
Dept: RADIOLOGY | Facility: CLINIC | Age: 71
End: 2021-08-24
Payer: MEDICARE

## 2021-08-24 VITALS
HEART RATE: 82 BPM | SYSTOLIC BLOOD PRESSURE: 128 MMHG | BODY MASS INDEX: 30.28 KG/M2 | DIASTOLIC BLOOD PRESSURE: 72 MMHG | WEIGHT: 188.4 LBS | HEIGHT: 66 IN

## 2021-08-24 DIAGNOSIS — M75.02 ADHESIVE CAPSULITIS OF LEFT SHOULDER: Primary | ICD-10-CM

## 2021-08-24 DIAGNOSIS — M25.512 LEFT SHOULDER PAIN, UNSPECIFIED CHRONICITY: ICD-10-CM

## 2021-08-24 DIAGNOSIS — M75.52 SUBACROMIAL BURSITIS OF LEFT SHOULDER JOINT: ICD-10-CM

## 2021-08-24 PROCEDURE — 20610 DRAIN/INJ JOINT/BURSA W/O US: CPT | Performed by: ORTHOPAEDIC SURGERY

## 2021-08-24 PROCEDURE — 99204 OFFICE O/P NEW MOD 45 MIN: CPT | Performed by: ORTHOPAEDIC SURGERY

## 2021-08-24 PROCEDURE — 73030 X-RAY EXAM OF SHOULDER: CPT

## 2021-08-24 RX ORDER — DEXAMETHASONE SODIUM PHOSPHATE 100 MG/10ML
40 INJECTION INTRAMUSCULAR; INTRAVENOUS
Status: COMPLETED | OUTPATIENT
Start: 2021-08-24 | End: 2021-08-24

## 2021-08-24 RX ORDER — BUPIVACAINE HYDROCHLORIDE 2.5 MG/ML
4 INJECTION, SOLUTION INFILTRATION; PERINEURAL
Status: COMPLETED | OUTPATIENT
Start: 2021-08-24 | End: 2021-08-24

## 2021-08-24 RX ADMIN — BUPIVACAINE HYDROCHLORIDE 4 ML: 2.5 INJECTION, SOLUTION INFILTRATION; PERINEURAL at 10:54

## 2021-08-24 RX ADMIN — DEXAMETHASONE SODIUM PHOSPHATE 40 MG: 100 INJECTION INTRAMUSCULAR; INTRAVENOUS at 10:54

## 2021-08-24 NOTE — PROGRESS NOTES
Assessment/Plan:  1  Adhesive capsulitis of left shoulder  Ambulatory referral to Physical Therapy    Large joint arthrocentesis: L subacromial bursa   2  Subacromial bursitis of left shoulder joint  Ambulatory referral to Physical Therapy    Large joint arthrocentesis: L subacromial bursa   3  Left shoulder pain, unspecified chronicity  XR shoulder 2+ vw left       Scribe Attestation    I,:  Buddy Burger am acting as a scribe while in the presence of the attending physician :       I,:  Andres Bowers MD personally performed the services described in this documentation    as scribed in my presence :             Left shoulder x-rays were obtained in the office and reviewed with Jhony Troy  Her signs and symptoms are consistent with subacromial bursitis and adhesive capsulitis  It was discussed with Jhony Troy that she may have a rotator cuff tear  A right shoulder MRI was offered today for further evaluation  Jhony Troy declined the MRI at this time as she is not interested in surgical intervention  A one time subacromial CSI was performed in the office without complication  Post injection protocol/expectations were reviewed  Jhony Troy will continue PT, a updated script was provided  Follow up in 6 weeks time for clinical re-evaluation  Subjective:   Vivek Graves is a 80 y o  female who presents to the office today for left shoulder pain  Jhony Troy states that she has been experiencing pain, weakness and stiffness to her left shoulder for aprox  1 year  Pain is located to the anterior aspect of her shoulder  [place] states pain will increase with lifting as she feels the shoulder is weak  Jhony Troy has been attending PT for close to 3 months regarding her left shoulder  Jhony Troy states that the physical therapist has been treating her for rotator cuff tendinitis and wanted to make sure the rotator cuff is not torn  She feels physical therapy has been beneficial for her   Jhony Troy is not currently taking anything for pain control  Review of Systems   Constitutional: Negative for chills, fever and unexpected weight change  HENT: Negative for hearing loss, nosebleeds and sore throat  Eyes: Negative for pain, redness and visual disturbance  Respiratory: Negative for cough, shortness of breath and wheezing  Cardiovascular: Negative for chest pain, palpitations and leg swelling  Gastrointestinal: Negative for abdominal pain, nausea and vomiting  Endocrine: Negative for polydipsia and polyuria  Genitourinary: Negative for difficulty urinating and hematuria  Musculoskeletal: Negative for arthralgias, joint swelling and myalgias  Skin: Negative for rash and wound  Neurological: Negative for dizziness, numbness and headaches  Psychiatric/Behavioral: Negative for decreased concentration, dysphoric mood and suicidal ideas  The patient is not nervous/anxious            Past Medical History:   Diagnosis Date    Borderline high cholesterol     Diabetes mellitus ([facility] [address] )        Past Surgical History:   Procedure Laterality Date    BREAST SURGERY      double masectomy     SECTION      LEG SURGERY      TONSILLECTOMY         Family History   Problem Relation Age of Onset    Cancer Mother     Heart disease Father        Social History     Occupational History    Not on file   Tobacco Use    Smoking status: Never Smoker    Smokeless tobacco: Never Used   Substance and Sexual Activity    Alcohol use: Never    Drug use: Never    Sexual activity: Not on file         Current Outpatient Medications:     metFORMIN (GLUCOPHAGE-XR) 500 mg 24 hr tablet, Take 1,000 mg by mouth 2 (two) times a day Every morning and at bedtime, Disp: , Rfl: 0    No Known Allergies    Objective:  Vitals:    21 1012   BP: 128/72   Pulse: 82       Left Shoulder Exam   Left shoulder exam is normal     Range of Motion   Active abduction: 100   External rotation: 70   Forward flexion: 160   Left shoulder internal rotation 0 degrees: 10      Muscle Strength   Abduction: 4/5   Internal rotation: 5/5   External rotation: 5/5     Other   Erythema: absent  Scars: absent  Sensation: normal  Pulse: present           Physical Exam  Vitals and nursing note reviewed  Constitutional:       Appearance: She is well-developed  HENT:      Head: Normocephalic and atraumatic  Eyes:      Pupils: Pupils are equal, round, and reactive to light  Pulmonary:      Effort: Pulmonary effort is normal       Breath sounds: Normal breath sounds  Musculoskeletal:      Cervical back: Neck supple  Comments: As per HPI   Skin:     General: Skin is warm and dry  Neurological:      Mental Status: She is alert and oriented to person, place, and time  Large joint arthrocentesis: L subacromial bursa  Universal Protocol:  Consent: Verbal consent obtained  Written consent not obtained  Risks and benefits: risks, benefits and alternatives were discussed  Consent given by: patient  Time out: Immediately prior to procedure a "time out" was called to verify the correct patient, procedure, equipment, support staff and site/side marked as required  Patient identity confirmed: verbally with patient    Supporting Documentation  Indications: pain   Procedure Details  Location: shoulder - L subacromial bursa  Preparation: Patient was prepped and draped in the usual sterile fashion  Needle size: 22 G  Ultrasound guidance: no  Medications administered: 4 mL bupivacaine 0 25 %; 40 mg dexamethasone 100 mg/10 mL    Patient tolerance: patient tolerated the procedure well with no immediate complications  Dressing:  Sterile dressing applied          I have personally reviewed pertinent films in PACS and my interpretation is as follows:    X-ray of the left shoulder demonstrates chronic grade 3 AC separation with small fracture and calcific tendinitis  No significant degenerative changes

## 2021-08-25 ENCOUNTER — OFFICE VISIT (OUTPATIENT)
Dept: PHYSICAL THERAPY | Facility: CLINIC | Age: 71
End: 2021-08-25
Payer: MEDICARE

## 2021-08-25 DIAGNOSIS — M25.512 CHRONIC LEFT SHOULDER PAIN: Primary | ICD-10-CM

## 2021-08-25 DIAGNOSIS — M75.82 ROTATOR CUFF TENDONITIS, LEFT: ICD-10-CM

## 2021-08-25 DIAGNOSIS — G89.29 CHRONIC LEFT SHOULDER PAIN: Primary | ICD-10-CM

## 2021-08-25 PROCEDURE — 97112 NEUROMUSCULAR REEDUCATION: CPT | Performed by: PHYSICAL THERAPIST

## 2021-08-25 PROCEDURE — 97110 THERAPEUTIC EXERCISES: CPT | Performed by: PHYSICAL THERAPIST

## 2021-08-25 PROCEDURE — 97140 MANUAL THERAPY 1/> REGIONS: CPT | Performed by: PHYSICAL THERAPIST

## 2021-08-26 NOTE — PROGRESS NOTES
Daily Note     Today's date: 2021  Patient name: Paresh Otto  : 1950  MRN: [id number]  Referring provider: Jayson Dumont MD  Dx:   Encounter Diagnosis     ICD-10-CM    1  Chronic left shoulder pain  M25 512     G89 29    2  Rotator cuff tendonitis, left  M75 82                   Subjective: Paresh Otto states she saw an orthopedist, Dr Lizy Herndon, for a consult who took x-rays which revealed calcific tendonitis  He administered a steroid injection and told her to continue with PT with follow up appt scheduled for 6 weeks  Objective: See treatment diary below      Assessment: Tolerated treatment well  Patient with slowly improving ability to raise arm overhead with decreasing pain levels but continued with scapular hiking that quickly worsens with fatigue  Her functional use of arm away from her side to reach, lift or push continues to be significantly limited  Plan: Continue per plan of care  Focus on improving RTC strength, SH rhythm with elevation of arm at and above shoulder height  Precautions: None  Initiated HEP as per [employer] access code: [facility]        Manuals    PROM All directions  Flex, abd, ER, IR   Flex/ER in 90 abd    MRE ER and rhythmic stab at 90   MRE ER in scapular plane; rhythmic stab at 90 flex      Neuro Re-Ed         Sup scap ret         Ball wall cw/ccw 90 only 40x cw/ccw 90 only 40x ea cw/ccw 3x15 cc/ccw 3x15 3 rolls, reset, alternate cw/ccw 4x15 3 rolls, reset, alternate cw/ccw    Sup GH stabs         Seated 90/90 ER 3x10   0lb - ER  IR red TB  TC for scap stab t/o   3x10     Serratus wall walk  horiz 10x B  vert 10x B       Standing flex AROM scaption  2x10 cues t/o for scapular position 15x with cues and AA to eccentrically lower         0 abd ER iso walk outs   1 0 [place] 3x8 with cues throughout  10x [place] 2 0  10x [place] 0 2 due to fatigue and pain    Punch to ceiling then eccentric lower in scaption         Prone horizontal abd in ER  3x10  +  2x10 in Y       Standing low row   2x10 yellow   2x10 yellow tube    Lower trap pull   Yellow 2x10      Ecceentric ER at side   2x10      Seated scap    10x AROM to 110  2x10 AAROM to ~150 focus on eccentrics     Ther Ex         SL ER   3x10 2x10 2x10    Sup cane flex         Sup cane ER         Pendulums    30x ea cw/ccw 30x ea cw/ccw    Door pec str     3x30s    Tube ER + IR         Behind Back IR str         ER AROM Ball under elbow 50x 0lb [place] 3x10 0 4       UBE L1 5min L1 5 min   5min L1    SL gh abd    2x10 2x10    Education      PT

## 2021-08-27 ENCOUNTER — APPOINTMENT (OUTPATIENT)
Dept: PHYSICAL THERAPY | Facility: CLINIC | Age: 71
End: 2021-08-27
Payer: MEDICARE

## 2021-09-01 ENCOUNTER — APPOINTMENT (OUTPATIENT)
Dept: PHYSICAL THERAPY | Facility: CLINIC | Age: 71
End: 2021-09-01
Payer: MEDICARE

## 2021-09-03 ENCOUNTER — OFFICE VISIT (OUTPATIENT)
Dept: PHYSICAL THERAPY | Facility: CLINIC | Age: 71
End: 2021-09-03
Payer: MEDICARE

## 2021-09-03 DIAGNOSIS — M25.512 CHRONIC LEFT SHOULDER PAIN: Primary | ICD-10-CM

## 2021-09-03 DIAGNOSIS — G89.29 CHRONIC LEFT SHOULDER PAIN: Primary | ICD-10-CM

## 2021-09-03 DIAGNOSIS — M75.82 ROTATOR CUFF TENDONITIS, LEFT: ICD-10-CM

## 2021-09-03 PROCEDURE — 97110 THERAPEUTIC EXERCISES: CPT | Performed by: PHYSICAL THERAPIST

## 2021-09-03 PROCEDURE — 97112 NEUROMUSCULAR REEDUCATION: CPT | Performed by: PHYSICAL THERAPIST

## 2021-09-03 NOTE — PROGRESS NOTES
Daily Note     Today's date: 9/3/2021  Patient name: Xochitl Amin  : 1950  MRN: [id number]  Referring provider: Eugenio Rojas MD  Dx:   Encounter Diagnosis     ICD-10-CM    1  Chronic left shoulder pain  M25 512     G89 29    2  Rotator cuff tendonitis, left  M75 82                   Subjective: Xochitl Amin reports she was busy this week helping her daughter who had surgery and caring for her granddaughter  She states she did not get as much of her HEP complete but was very active and noticed her shoulder held up pretty well with decreasing overall pain levels  Objective: See treatment diary below       Assessment: Tolerated treatment well  Patient demos slowly improving but limited postrior cuff strength and endurance impacting ability to elevate arm without premature and excessive scapular hiking  Plan: Continue per plan of care  Transitioning to 1x/wk and progression of hep  Precautions: None  Initiated HEP as per [employer] access code: [facility]        Manuals 9/3 8/25 8/20 8/18 8/13   PROM  All directions  Flex, abd, ER, IR  Flex/ER in 90 abd   MRE ER and rhythmic stab at 90    MRE ER in scapular plane; rhythmic stab at 90 flex    Neuro Re-Ed        Sup scap ret        Ball wall cw/ccw  90 only 40x cw/ccw 90 only 40x ea cw/ccw 3x15 cc/ccw 4x15 3 rolls, reset, alternate cw/ccw   Sup GH stabs        Seated 90/90 ER  3x10   0lb - ER  IR red TB  TC for scap stab t/o      Serratus wall walk   horiz 10x B  vert 10x B     Standing flex AROM Flex/scap 15x ea w/ scap up rot MWM scaption  2x10 cues t/o for scapular position 15x with cues and AA to eccentrically lower       0 abd ER iso walk outs    1 0 [place] 3x8 with cues throughout 10x [place] 2 0  10x [place] 0 2 due to fatigue and pain   Serratus wall pushups 2x10 5s       Prone horizontal abd in ER 3x10  3x10  +  2x10 in Y     Standing low row    2x10 yellow  2x10 yellow tube   Lower trap pull    Yellow 2x10    Ecceentric ER at side    2x10    Seated scap        Ther Ex        SL ER 2x15 2lb cues for scap pos and inhibit biceps   3x10 2x10   Sup cane flex        Sup cane ER        Pendulums     30x ea cw/ccw   Door pec str     3x30s   Tube ER + IR        Behind Back IR str        ER AROM  Ball under elbow 50x 0lb [place] 3x10 0 4     UBE L1 5 min L1 5min L1 5 min  5min L1   SL gh abd     2x10   Education

## 2021-09-08 ENCOUNTER — APPOINTMENT (OUTPATIENT)
Dept: PHYSICAL THERAPY | Facility: CLINIC | Age: 71
End: 2021-09-08
Payer: MEDICARE

## 2021-09-10 ENCOUNTER — EVALUATION (OUTPATIENT)
Dept: PHYSICAL THERAPY | Facility: CLINIC | Age: 71
End: 2021-09-10
Payer: MEDICARE

## 2021-09-10 DIAGNOSIS — M25.512 CHRONIC LEFT SHOULDER PAIN: Primary | ICD-10-CM

## 2021-09-10 DIAGNOSIS — G89.29 CHRONIC LEFT SHOULDER PAIN: Primary | ICD-10-CM

## 2021-09-10 DIAGNOSIS — M75.82 ROTATOR CUFF TENDONITIS, LEFT: ICD-10-CM

## 2021-09-10 PROCEDURE — 97110 THERAPEUTIC EXERCISES: CPT | Performed by: PHYSICAL THERAPIST

## 2021-09-10 PROCEDURE — 97112 NEUROMUSCULAR REEDUCATION: CPT | Performed by: PHYSICAL THERAPIST

## 2021-09-10 NOTE — PROGRESS NOTES
PT Re-Evaluation     Today's date: 9/10/2021  Patient name: Arnav Limon  : 1950  MRN: [id number]  Referring provider: Ciro Hardwick MD  Dx:   Encounter Diagnosis     ICD-10-CM    1  Chronic left shoulder pain  M25 512     G89 29    2  Rotator cuff tendonitis, left  M75 82                   Assessment  Assessment details: Arnav Limon has been seen for 27 visits of physical therapy  Arnav Limon has shown a significant decrease in pain levels and improved ROM since her recent steroid injection  She has also seen some improvement in functional use of shoulder below shoulder height  She does however continue with significant RTC weakness indicative of a RTC tear  Due to the fact that she does not want to pursue surgery for any repair she will benefit from continued therapy for 1x/wk for 2-4 weeks to improve her independence with her HEP to address remaining ROM, strength deficits and educate in ways to prevent further damage to RTC  Impairments: abnormal or restricted ROM, abnormal movement, activity intolerance, impaired physical strength, pain with function, poor posture  and poor body mechanics  Understanding of Dx/Px/POC: good   Prognosis: good    Goals  STG:  to be achieved within 2-4 weeks  1  Pt will demo 50% improvement in shoulder AROM to improve self care and household ADLs - Met  2  Improve shoulder strength in all deficient planes by 1/2 MMT  - Met  3  Pt will have good understanding of basic HEP  - Met      LTG:  to be achieved within 4-8 weeks  1  Improve strength to be able to put dishes into an overhead cabinet  - In progress  2  Pt will be able to sleep without disturbance secondary to shoulder pain  - Met  3  Pt will demo full shoulder AROM to return to household duties  - Not met  4   Pt will be I with comprehensive I HEP  - In Progress    Plan  Plan details:  HEP development, stretching, strengthening, A/AA/PROM, joint mobilizations, posture education, STM/MI as needed to reduce muscle tension, muscle reeducation, PLOC discussed and agreed upon with patient  Patient would benefit from: skilled physical therapy  Planned modality interventions: cryotherapy and thermotherapy: hydrocollator packs  Planned therapy interventions: manual therapy, neuromuscular re-education, self care, therapeutic exercise, therapeutic activities, home exercise program, joint mobilization, postural training, patient education, strengthening and stretching  Frequency: 1x week  Duration in weeks: 4  Treatment plan discussed with: patient        Subjective Evaluation    History of Present Illness  Mechanism of injury: Amanda Aaron report momentary left shoulder pain that is sharp in nature when moving in certain directions  She is in little to no pain while at rest at this time  She has difficulty lifting things with left arm, reaching to overhead shelf  She reports a "catching" while lowering arm from overhead  As per  FOTO pt reports:  "Some difficulty" with: Performing heavy activities around your home, reach a shelf that is overhead, lower a lightweight object from top shelf  "Little bit of difficulty" with: Reach a shelf that is at shoulder height  Pain  Current pain ratin  At best pain ratin  At worst pain rating: 3  Location: anterior shoulder pain  Quality: sharp, dull ache and tight  Relieving factors: medications  Aggravating factors: overhead activity and lifting    Social Support  Lives with: alone    Employment status: not working  Hand dominance: right      Diagnostic Tests  No diagnostic tests performed  Patient Goals  Patient goals for therapy: increased strength, return to sport/leisure activities, independence with ADLs/IADLs, decreased pain and increased motion          Objective     Palpation   Left   No palpable tenderness to the biceps  Hypertonic in the latissimus, pectoralis major and pectoralis minor     Tenderness of the infraspinatus, latissimus, pectoralis major and pectoralis minor  Tenderness     Left Shoulder   Tenderness in the infraspinatus tendon  No tenderness in the [facility] joint, biceps tendon (proximal) and supraspinatus tendon  Active Range of Motion   Left Shoulder   Flexion: 168 degrees with pain  Abduction: 153 degrees with pain  External rotation BTH: T4 with pain  Internal rotation BTB: T6 with pain    Right Shoulder   Flexion: 165 degrees   Abduction: 180 degrees   External rotation BTH: T4   Internal rotation BTB: T6     Passive Range of Motion   Left Shoulder   Flexion: 158 degrees with pain  Abduction: 160 degrees with pain  External rotation 90°: 72 degrees with pain  Internal rotation 90°: 55 degrees with pain    Right Shoulder   Flexion: 170 degrees   Abduction: 180 degrees   External rotation 90°: 75 degrees   Internal rotation 90°: 55 degrees     Strength/Myotome Testing     Left Shoulder     Planes of Motion   Flexion: 4- (Pain with all MMT on Left)   Abduction: 3+   External rotation at 0°: 4   Internal rotation at 0°: 5     Right Shoulder     Planes of Motion   Flexion: 5   Abduction: 4   External rotation at 90°: 4   Internal rotation at 90°: 5     Tests     Left Shoulder   Positive empty can and painful arc  Precautions: None  Initiated HEP as per [employer] access code: [email or web address] updated and reviewed on 9/10/21        Manuals 9/10 9/3 8/25 8/20 8/18   PROM   All directions  Flex, abd, ER, IR    MRE ER and rhythmic stab at 90     MRE ER in scapular plane; rhythmic stab at 90 flex   Neuro Re-Ed        Sup scap ret        Ball wall cw/ccw   90 only 40x cw/ccw 90 only 40x ea cw/ccw 3x15 cc/ccw   Sup GH stabs        Seated 90/90 ER   3x10   0lb - ER  IR red TB  TC for scap stab t/o     Serratus wall walk    horiz 10x B  vert 10x B    Standing flex AROM  Flex/scap 15x ea w/ scap up rot MWM scaption  2x10 cues t/o for scapular position 15x with cues and AA to eccentrically lower      0 abd ER iso walk outs     1 0 [place] 3x8 with cues throughout   Serratus wall pushups 2x10 5s 2x10 5s      Scaption 2x10       Prone horizontal abd in ER  3x10  3x10  +  2x10 in Y    Standing low row     2x10 yellow    Lower trap pull     Yellow 2x10   Ecceentric ER at side     2x10   Seated scap        Ther Ex        SL ER 3x10 1lb 2x15 2lb cues for scap pos and inhibit biceps   3x10   Sup cane flex        Sup cane ER        Pendulums        Door pec str        Tube ER + IR        Behind Back IR str        ER AROM   Ball under elbow 50x 0lb [place] 3x10 0 4    UBE  L1 5 min L1 5min L1 5 min    SL gh abd        Education Review of HEP

## 2021-09-10 NOTE — LETTER
2021    Ally Young MD  [address]    Patient: Casi Vallejo   YOB: 1950   Date of Visit: 9/10/2021     Encounter Diagnosis     ICD-10-CM    1  Chronic left shoulder pain  M25 512     G89 29    2  Rotator cuff tendonitis, left  M75 82        Dear Dr Aidee Rubi: Thank you for your recent referral of Casi Vallejo  Please review the attached evaluation summary from [facility]'s recent visit  Please verify that you agree with the plan of care by signing the attached order  If you have any questions or concerns, please do not hesitate to call  I sincerely appreciate the opportunity to share in the care of one of your patients and hope to have another opportunity to work with you in the near future  Sincerely,    Isabel Funk, PT      Referring Provider:      I certify that I have read the below Plan of Care and certify the need for these services furnished under this plan of treatment while under my care  Ally Young MD  [address]  Via Fax: [phone]          PT Re-Evaluation     Today's date: 9/10/2021  Patient name: Casi Vallejo  : 1950  MRN: [id number]  Referring provider: Yaya Tamayo MD  Dx:   Encounter Diagnosis     ICD-10-CM    1  Chronic left shoulder pain  M25 512     G89 29    2  Rotator cuff tendonitis, left  M75 82                   Assessment  Assessment details: Casi Vallejo has been seen for 27 visits of physical therapy  Casi Vallejo has shown a significant decrease in pain levels and improved ROM since her recent steroid injection  She has also seen some improvement in functional use of shoulder below shoulder height  She does however continue with significant RTC weakness indicative of a RTC tear    Due to the fact that she does not want to pursue surgery for any repair she will benefit from continued therapy for 1x/wk for 2-4 weeks to improve her independence with her HEP to address remaining ROM, strength deficits and educate in ways to prevent further damage to RTC  Impairments: abnormal or restricted ROM, abnormal movement, activity intolerance, impaired physical strength, pain with function, poor posture  and poor body mechanics  Understanding of Dx/Px/POC: good   Prognosis: good    Goals  STG:  to be achieved within 2-4 weeks  1  Pt will demo 50% improvement in shoulder AROM to improve self care and household ADLs - Met  2  Improve shoulder strength in all deficient planes by 1/2 MMT  - Met  3  Pt will have good understanding of basic HEP  - Met      LTG:  to be achieved within 4-8 weeks  1  Improve strength to be able to put dishes into an overhead cabinet  - In progress  2  Pt will be able to sleep without disturbance secondary to shoulder pain  - Met  3  Pt will demo full shoulder AROM to return to household duties  - Not met  4  Pt will be I with comprehensive I HEP  - In Progress    Plan  Plan details:  HEP development, stretching, strengthening, A/AA/PROM, joint mobilizations, posture education, STM/MI as needed to reduce muscle tension, muscle reeducation, PLOC discussed and agreed upon with patient  Patient would benefit from: skilled physical therapy  Planned modality interventions: cryotherapy and thermotherapy: hydrocollator packs  Planned therapy interventions: manual therapy, neuromuscular re-education, self care, therapeutic exercise, therapeutic activities, home exercise program, joint mobilization, postural training, patient education, strengthening and stretching  Frequency: 1x week  Duration in weeks: 4  Treatment plan discussed with: patient        Subjective Evaluation    History of Present Illness  Mechanism of injury: Ryan Quiroz report momentary left shoulder pain that is sharp in nature when moving in certain directions  She is in little to no pain while at rest at this time    She has difficulty lifting things with left arm, reaching to overhead shelf  She reports a "catching" while lowering arm from overhead  As per  FOTO pt reports:  "Some difficulty" with: Performing heavy activities around your home, reach a shelf that is overhead, lower a lightweight object from top shelf  "Little bit of difficulty" with: Reach a shelf that is at shoulder height  Pain  Current pain ratin  At best pain ratin  At worst pain rating: 3  Location: anterior shoulder pain  Quality: sharp, dull ache and tight  Relieving factors: medications  Aggravating factors: overhead activity and lifting    Social Support  Lives with: alone    Employment status: not working  Hand dominance: right      Diagnostic Tests  No diagnostic tests performed  Patient Goals  Patient goals for therapy: increased strength, return to sport/leisure activities, independence with ADLs/IADLs, decreased pain and increased motion          Objective     Palpation   Left   No palpable tenderness to the biceps  Hypertonic in the latissimus, pectoralis major and pectoralis minor  Tenderness of the infraspinatus, latissimus, pectoralis major and pectoralis minor  Tenderness     Left Shoulder   Tenderness in the infraspinatus tendon  No tenderness in the [facility] [place] joint, biceps tendon (proximal) and supraspinatus tendon       Active Range of Motion   Left Shoulder   Flexion: 168 degrees with pain  Abduction: 153 degrees with pain  External rotation BTH: T4 with pain  Internal rotation BTB: T6 with pain    Right Shoulder   Flexion: 165 degrees   Abduction: 180 degrees   External rotation BTH: T4   Internal rotation BTB: T6     Passive Range of Motion   Left Shoulder   Flexion: 158 degrees with pain  Abduction: 160 degrees with pain  External rotation 90°: 72 degrees with pain  Internal rotation 90°: 55 degrees with pain    Right Shoulder   Flexion: 170 degrees   Abduction: 180 degrees   External rotation 90°: 75 degrees   Internal rotation 90°: 55 degrees     Strength/Myotome Testing     Left Shoulder     Planes of Motion   Flexion: 4- (Pain with all MMT on Left)   Abduction: 3+   External rotation at 0°: 4   Internal rotation at 0°: 5     Right Shoulder     Planes of Motion   Flexion: 5   Abduction: 4   External rotation at 90°: 4   Internal rotation at 90°: 5     Tests     Left Shoulder   Positive empty can and painful arc  Precautions: None  Initiated HEP as per [employer] access code: [email or web address] updated and reviewed on 9/10/21        Manuals 9/10 9/3 8/25 8/20 8/18   PROM   All directions  Flex, abd, ER, IR    MRE ER and rhythmic stab at 90     MRE ER in scapular plane; rhythmic stab at 90 flex   Neuro Re-Ed        Sup scap ret        Ball wall cw/ccw   90 only 40x cw/ccw 90 only 40x ea cw/ccw 3x15 cc/ccw   Sup GH stabs        Seated 90/90 ER   3x10   0lb - ER  IR red TB  TC for scap stab t/o     Serratus wall walk    horiz 10x B  vert 10x B    Standing flex AROM  Flex/scap 15x ea w/ scap up rot MWM scaption  2x10 cues t/o for scapular position 15x with cues and AA to eccentrically lower      0 abd ER iso walk outs     1 0 [place] 3x8 with cues throughout   Serratus wall pushups 2x10 5s 2x10 5s      Scaption 2x10       Prone horizontal abd in ER  3x10  3x10  +  2x10 in Y    Standing low row     2x10 yellow    Lower trap pull     Yellow 2x10   Ecceentric ER at side     2x10   Seated scap        Ther Ex        SL ER 3x10 1lb 2x15 2lb cues for scap pos and inhibit biceps   3x10   Sup cane flex        Sup cane ER        Pendulums        Door pec str        Tube ER + IR        Behind Back IR str        ER AROM   Ball under elbow 50x 0lb [place] 3x10 0 4    UBE  L1 5 min L1 5min L1 5 min    SL gh abd        Education Review of HEP

## 2021-09-15 ENCOUNTER — APPOINTMENT (OUTPATIENT)
Dept: PHYSICAL THERAPY | Facility: CLINIC | Age: 71
End: 2021-09-15
Payer: MEDICARE

## 2021-09-17 ENCOUNTER — OFFICE VISIT (OUTPATIENT)
Dept: PHYSICAL THERAPY | Facility: CLINIC | Age: 71
End: 2021-09-17
Payer: MEDICARE

## 2021-09-17 DIAGNOSIS — M25.512 CHRONIC LEFT SHOULDER PAIN: Primary | ICD-10-CM

## 2021-09-17 DIAGNOSIS — M75.82 ROTATOR CUFF TENDONITIS, LEFT: ICD-10-CM

## 2021-09-17 DIAGNOSIS — G89.29 CHRONIC LEFT SHOULDER PAIN: Primary | ICD-10-CM

## 2021-09-17 PROCEDURE — 97112 NEUROMUSCULAR REEDUCATION: CPT | Performed by: PHYSICAL THERAPIST

## 2021-09-17 PROCEDURE — 97110 THERAPEUTIC EXERCISES: CPT | Performed by: PHYSICAL THERAPIST

## 2021-09-17 NOTE — PROGRESS NOTES
Daily Note     Today's date: 2021  Patient name: Slade Turk  : 1950  MRN: [id number]  Referring provider: Dave Estes MD  Dx:   Encounter Diagnosis     ICD-10-CM    1  Chronic left shoulder pain  M25 512     G89 29    2  Rotator cuff tendonitis, left  M75 82        Start Time: 0930  Stop Time: 1015  Total time in clinic (min): 45 minutes    Subjective: Slade Turk reports concerns about trying planks in her pilates class, in fear of hurting her L shoulder  Objective: See treatment diary below       Assessment: Tolerated treatment well  Client with good questions in regards to HEP, reflecting appropriate compliance and carryover with her comprehensive program  She will continue to benefit from skilled OPPT services 1x per week to fully prepare for transition to home program and personal exercise classes  Plan: Continue per plan of care  Transitioning to 1x/wk and progression of HEP  Precautions: None  Initiated HEP as per [employer] access code: [facility]        Manuals 9/17 9/10 9/3 8/25 8/20 8/18   PROM    All directions  Flex, abd, ER, IR    MRE ER and rhythmic stab at 90      MRE ER in scapular plane; rhythmic stab at 90 flex   Neuro Re-Ed         Sup scap ret         Ball wall cw/ccw 90 degrees 30x cw/ccw   90 only 40x cw/ccw 90 only 40x ea cw/ccw 3x15 cc/ccw   Sup GH stabs         Seated 90/90 ER    3x10   0lb - ER  IR red TB  TC for scap stab t/o     Serratus wall walk     horiz 10x B  vert 10x B    Standing flex AROM   Flex/scap 15x ea w/ scap up rot MWM scaption  2x10 cues t/o for scapular position 15x with cues and AA to eccentrically lower      0 abd ER iso walk outs      1 0 [place] 3x8 with cues throughout   Serratus wall pushups  2x10 5s 2x10 5s      Scaption  2x10       Prone horizontal abd in ER   3x10  3x10  +  2x10 in Y    Standing low row      2x10 yellow    Lower trap pull      Yellow 2x10   Ecceentric ER at side      2x10   Plank modifications Standing, off table, quadruped x 10 min        Ther Ex         SL ER  3x10 1lb 2x15 2lb cues for scap pos and inhibit biceps   3x10   Sup cane flex         Sup cane ER 3x10 with 1# on cane        Pendulums         Door pec str         Tube ER + IR         Behind Back IR str 15 sec x 10        ER AROM    Ball under elbow 50x 0lb [place] 3x10 0 4    UBE   L1 5 min L1 5min L1 5 min    SL gh abd         Education Review of HEP with carryover    Also reviewed posture/technique with planks for pilates Review of HEP       Standing GH abd 10x 2-3 sec holds

## 2021-09-22 ENCOUNTER — APPOINTMENT (OUTPATIENT)
Dept: PHYSICAL THERAPY | Facility: CLINIC | Age: 71
End: 2021-09-22
Payer: MEDICARE

## 2021-09-24 ENCOUNTER — OFFICE VISIT (OUTPATIENT)
Dept: PHYSICAL THERAPY | Facility: CLINIC | Age: 71
End: 2021-09-24
Payer: MEDICARE

## 2021-09-24 DIAGNOSIS — M25.512 CHRONIC LEFT SHOULDER PAIN: Primary | ICD-10-CM

## 2021-09-24 DIAGNOSIS — M75.82 ROTATOR CUFF TENDONITIS, LEFT: ICD-10-CM

## 2021-09-24 DIAGNOSIS — G89.29 CHRONIC LEFT SHOULDER PAIN: Primary | ICD-10-CM

## 2021-09-24 PROCEDURE — 97110 THERAPEUTIC EXERCISES: CPT | Performed by: PHYSICAL THERAPIST

## 2021-09-24 PROCEDURE — 97112 NEUROMUSCULAR REEDUCATION: CPT | Performed by: PHYSICAL THERAPIST

## 2021-09-24 NOTE — PROGRESS NOTES
Daily Note     Today's date: 2021  Patient name: Bobby Smith  : 1950  MRN: [id number]  Referring provider: Jesus Cruz MD  Dx:   Encounter Diagnosis     ICD-10-CM    1  Chronic left shoulder pain  M25 512     G89 29    2  Rotator cuff tendonitis, left  M75 82                   Subjective: Bobby Smith reports soreness/pain with trying to raise arm overhead while on vacation  She wasn't as compliant with her HEP while on vacation and says she notices a difference  Objective: See treatment diary below      Assessment: Tolerated treatment well  Patient demonstrated fatigue post treatment and exhibited good technique with therapeutic exercises with continued need for cues for proper performance of exercises and scap stability  She also continued with RTC weakness impacting functional reaching + elevation of arm against gravity  Plan: Continue per plan of care  Focus on building I with her HEP over next several visits to be able to DC to HEP and continually address deficits and avoid further injury/need for RTC repair       Precautions: None  Initiated HEP as per [employer] access code: [facility]        Manuals 9/24 9/17 9/10 9/3 8/25   PROM     All directions   MRE ER and rhythmic stab at 90        Neuro Re-Ed        Sup scap ret        Ball wall cw/ccw 90 degrees  30x/15x cw/ccw 90 degrees 30x cw/ccw   90 only 40x cw/ccw   Sup GH stabs        Seated 90/90 ER     3x10   0lb - ER  IR red TB  TC for scap stab t/o   Serratus wall walk        Standing flex AROM    Flex/scap 15x ea w/ scap up rot MWM scaption  2x10 cues t/o for scapular position   0 abd ER iso walk outs        Serratus wall pushups   2x10 5s 2x10 5s    Scaption 2x10  2x10     Prone horizontal abd in ER    3x10    Standing low row        Lower trap pull        Ecceentric ER at side        Plank modifications  Standing, off table, quadruped x 10 min      Ther Ex        SL ER 3x10 1lb  3x10 1lb 2x15 2lb cues for scap pos and inhibit biceps    Sup cane flex 2x10       Sup cane ER 2x10 3x10 with 1# on cane      Pendulums 20x cw/ccw       Door pec str        Tube ER + IR        Behind Back IR str 3x30s 15 sec x 10      ER AROM     Ball under elbow 50x 0lb   UBE    L1 5 min L1 5min   SL gh abd 3x10 0lb       Education  Review of HEP with carryover    Also reviewed posture/technique with planks for pilates Review of HEP     Standing GH abd  10x 2-3 sec holds

## 2021-09-29 ENCOUNTER — APPOINTMENT (OUTPATIENT)
Dept: PHYSICAL THERAPY | Facility: CLINIC | Age: 71
End: 2021-09-29
Payer: MEDICARE

## 2021-10-01 ENCOUNTER — OFFICE VISIT (OUTPATIENT)
Dept: PHYSICAL THERAPY | Facility: CLINIC | Age: 71
End: 2021-10-01
Payer: MEDICARE

## 2021-10-01 DIAGNOSIS — G89.29 CHRONIC LEFT SHOULDER PAIN: Primary | ICD-10-CM

## 2021-10-01 DIAGNOSIS — M25.512 CHRONIC LEFT SHOULDER PAIN: Primary | ICD-10-CM

## 2021-10-01 DIAGNOSIS — M75.82 ROTATOR CUFF TENDONITIS, LEFT: ICD-10-CM

## 2021-10-01 PROCEDURE — 97112 NEUROMUSCULAR REEDUCATION: CPT | Performed by: PHYSICAL THERAPIST

## 2021-10-01 PROCEDURE — 97110 THERAPEUTIC EXERCISES: CPT | Performed by: PHYSICAL THERAPIST

## 2021-10-05 ENCOUNTER — OFFICE VISIT (OUTPATIENT)
Dept: OBGYN CLINIC | Facility: CLINIC | Age: 71
End: 2021-10-05
Payer: MEDICARE

## 2021-10-05 VITALS
WEIGHT: 183 LBS | SYSTOLIC BLOOD PRESSURE: 119 MMHG | HEART RATE: 79 BPM | DIASTOLIC BLOOD PRESSURE: 73 MMHG | BODY MASS INDEX: 29.41 KG/M2 | HEIGHT: 66 IN

## 2021-10-05 DIAGNOSIS — M75.52 SUBACROMIAL BURSITIS OF LEFT SHOULDER JOINT: ICD-10-CM

## 2021-10-05 DIAGNOSIS — M75.02 ADHESIVE CAPSULITIS OF LEFT SHOULDER: Primary | ICD-10-CM

## 2021-10-05 PROCEDURE — 99213 OFFICE O/P EST LOW 20 MIN: CPT | Performed by: ORTHOPAEDIC SURGERY

## 2021-10-08 ENCOUNTER — APPOINTMENT (OUTPATIENT)
Dept: PHYSICAL THERAPY | Facility: CLINIC | Age: 71
End: 2021-10-08
Payer: MEDICARE

## 2021-10-15 ENCOUNTER — EVALUATION (OUTPATIENT)
Dept: PHYSICAL THERAPY | Facility: CLINIC | Age: 71
End: 2021-10-15
Payer: MEDICARE

## 2021-10-15 DIAGNOSIS — M25.512 CHRONIC LEFT SHOULDER PAIN: Primary | ICD-10-CM

## 2021-10-15 DIAGNOSIS — M75.82 ROTATOR CUFF TENDONITIS, LEFT: ICD-10-CM

## 2021-10-15 DIAGNOSIS — G89.29 CHRONIC LEFT SHOULDER PAIN: Primary | ICD-10-CM

## 2021-10-15 PROCEDURE — 97112 NEUROMUSCULAR REEDUCATION: CPT | Performed by: PHYSICAL THERAPIST

## 2021-10-15 PROCEDURE — 97110 THERAPEUTIC EXERCISES: CPT | Performed by: PHYSICAL THERAPIST

## 2021-10-22 ENCOUNTER — OFFICE VISIT (OUTPATIENT)
Dept: PHYSICAL THERAPY | Facility: CLINIC | Age: 71
End: 2021-10-22
Payer: MEDICARE

## 2021-10-22 DIAGNOSIS — M75.82 ROTATOR CUFF TENDONITIS, LEFT: ICD-10-CM

## 2021-10-22 DIAGNOSIS — G89.29 CHRONIC LEFT SHOULDER PAIN: Primary | ICD-10-CM

## 2021-10-22 DIAGNOSIS — M25.512 CHRONIC LEFT SHOULDER PAIN: Primary | ICD-10-CM

## 2021-10-22 PROCEDURE — 97112 NEUROMUSCULAR REEDUCATION: CPT | Performed by: PHYSICAL THERAPIST

## 2021-10-22 PROCEDURE — 97110 THERAPEUTIC EXERCISES: CPT | Performed by: PHYSICAL THERAPIST

## 2021-10-29 ENCOUNTER — OFFICE VISIT (OUTPATIENT)
Dept: PHYSICAL THERAPY | Facility: CLINIC | Age: 71
End: 2021-10-29
Payer: MEDICARE

## 2021-10-29 DIAGNOSIS — G89.29 CHRONIC LEFT SHOULDER PAIN: Primary | ICD-10-CM

## 2021-10-29 DIAGNOSIS — M25.512 CHRONIC LEFT SHOULDER PAIN: Primary | ICD-10-CM

## 2021-10-29 DIAGNOSIS — M75.82 ROTATOR CUFF TENDONITIS, LEFT: ICD-10-CM

## 2021-10-29 PROCEDURE — 97112 NEUROMUSCULAR REEDUCATION: CPT | Performed by: PHYSICAL THERAPIST

## 2021-10-29 PROCEDURE — 97110 THERAPEUTIC EXERCISES: CPT | Performed by: PHYSICAL THERAPIST

## 2021-11-04 ENCOUNTER — OFFICE VISIT (OUTPATIENT)
Dept: PHYSICAL THERAPY | Facility: CLINIC | Age: 71
End: 2021-11-04
Payer: MEDICARE

## 2021-11-04 DIAGNOSIS — M75.82 ROTATOR CUFF TENDONITIS, LEFT: ICD-10-CM

## 2021-11-04 DIAGNOSIS — G89.29 CHRONIC LEFT SHOULDER PAIN: Primary | ICD-10-CM

## 2021-11-04 DIAGNOSIS — M25.512 CHRONIC LEFT SHOULDER PAIN: Primary | ICD-10-CM

## 2021-11-04 PROCEDURE — 97110 THERAPEUTIC EXERCISES: CPT | Performed by: PHYSICAL THERAPIST

## 2021-11-04 PROCEDURE — 97112 NEUROMUSCULAR REEDUCATION: CPT | Performed by: PHYSICAL THERAPIST

## 2021-11-05 ENCOUNTER — APPOINTMENT (OUTPATIENT)
Dept: PHYSICAL THERAPY | Facility: CLINIC | Age: 71
End: 2021-11-05
Payer: MEDICARE

## 2021-11-12 ENCOUNTER — OFFICE VISIT (OUTPATIENT)
Dept: PHYSICAL THERAPY | Facility: CLINIC | Age: 71
End: 2021-11-12
Payer: MEDICARE

## 2021-11-12 DIAGNOSIS — G89.29 CHRONIC LEFT SHOULDER PAIN: Primary | ICD-10-CM

## 2021-11-12 DIAGNOSIS — M25.512 CHRONIC LEFT SHOULDER PAIN: Primary | ICD-10-CM

## 2021-11-12 DIAGNOSIS — M75.82 ROTATOR CUFF TENDONITIS, LEFT: ICD-10-CM

## 2021-11-12 PROCEDURE — 97110 THERAPEUTIC EXERCISES: CPT | Performed by: PHYSICAL THERAPIST

## 2021-11-12 PROCEDURE — 97112 NEUROMUSCULAR REEDUCATION: CPT | Performed by: PHYSICAL THERAPIST

## 2021-11-18 ENCOUNTER — APPOINTMENT (OUTPATIENT)
Dept: PHYSICAL THERAPY | Facility: CLINIC | Age: 71
End: 2021-11-18
Payer: MEDICARE

## 2021-11-19 ENCOUNTER — APPOINTMENT (OUTPATIENT)
Dept: PHYSICAL THERAPY | Facility: CLINIC | Age: 71
End: 2021-11-19
Payer: MEDICARE

## 2021-11-26 ENCOUNTER — APPOINTMENT (OUTPATIENT)
Dept: PHYSICAL THERAPY | Facility: CLINIC | Age: 71
End: 2021-11-26
Payer: MEDICARE

## 2021-12-15 ENCOUNTER — OFFICE VISIT (OUTPATIENT)
Dept: PHYSICAL THERAPY | Facility: CLINIC | Age: 71
End: 2021-12-15
Payer: MEDICARE

## 2021-12-15 DIAGNOSIS — M25.512 CHRONIC LEFT SHOULDER PAIN: Primary | ICD-10-CM

## 2021-12-15 DIAGNOSIS — G89.29 CHRONIC LEFT SHOULDER PAIN: Primary | ICD-10-CM

## 2021-12-15 DIAGNOSIS — M75.82 ROTATOR CUFF TENDONITIS, LEFT: ICD-10-CM

## 2021-12-15 PROCEDURE — 97110 THERAPEUTIC EXERCISES: CPT | Performed by: PHYSICAL THERAPIST

## 2023-05-23 ENCOUNTER — EVALUATION (OUTPATIENT)
Dept: OCCUPATIONAL THERAPY | Facility: CLINIC | Age: 73
End: 2023-05-23

## 2023-05-23 DIAGNOSIS — M79.645 PAIN IN FINGER OF LEFT HAND: Primary | ICD-10-CM

## 2023-05-23 NOTE — LETTER
May 24, 2023    Benji Medina MD  [address]    Patient: Sidney Cooper   YOB: 1950   Date of Visit: 2023     Encounter Diagnosis     ICD-10-CM    1  Pain in finger of left hand  M79 645           Dear Dr Bryce Rm: Thank you for your recent referral of Sidney Cooper  Please review the attached evaluation summary from Sahara's recent visit  Please verify that you agree with the plan of care by signing the attached order  If you have any questions or concerns, please do not hesitate to call  I sincerely appreciate the opportunity to share in the care of one of your patients and hope to have another opportunity to work with you in the near future  Sincerely,    Viviane Barnes, OT      Referring Provider:     I certify that I have read the below Plan of Care and certify the need for these services furnished under this plan of treatment while under my care  Benji Medina MD  [address]  Via Fax: [phone]        OT Evaluation     Today's date: 2023  Patient name: Sidney Cooper  : 1950  MRN: [id number]  Referring provider: Elzbieta Orona MD  Dx:   Encounter Diagnosis     ICD-10-CM    1  Pain in finger of left hand  M79 645           Start Time: 1530  Stop Time: 1615  Total time in clinic (min): 45 minutes    Assessment  Assessment details: Patient is a 71 y o RHD  female who presents for OT IE and treatment for left ring finger tendon  Patient repored pain and symptoms started approximately 2 weeks to the volar side of the palm of the LUE ring finger and demonstrated tenderness when palpated at today's session  Currently patient is using an anti - inflammatory cream as needed to help assist in pain management  Patient denies any trauma or injury to the digit   During today's assessment, patient demonstrated triggering to the A1 pulley of the LUE ring finger tendon when directed to make a tight full composite fist  Patient denies any type of symptoms having occurred prior to 2 weeks ago  Patient has not been seen by her primary care provider or an orthopedic doctor at this time  Patient verbalized to therapist that she wanted to complete therapy prior to seeing the doctor  Therapist discussed in length with the patient regarding conservative and surgical options for potential diagnosis of trigger finger  Patient will be getting in contact with her primary care physician [facility]) regarding getting seen by an orthopedic surgeon for an official diagnosis  Patient was provided education and demonstration on how to properly don and doff an Oval 8 finger splint (size 8) and coban wrap to help assist in minimizing the triggering of the LUE ring finger at this time  Patient was educated on HEP and to get in contact with her PPC  Patient further report that she will be leaving in August to visit family and friends in [facility] and will be returning in September  Patient referred by Dr Regina Marina from [address] [place] [address] to initiate treatment including hand therapy  Impairments: abnormal or restricted ROM, lacks appropriate home exercise program and pain with function    Goals  Short Term Goals by 2 - 4 weeks:    Establish HEP to increase performance with daily activities  Patient will increase ROM of LUE ring finger by at least 10 degrees to complete ADLs  Patient will decrease pain rate of UE by at least 2 points per the VAS scale  Long Term Goals by discharge:    Establish final home exercise program to enhance maximal functional level with ADLs  Achieve functional active range of motion of LUE for full return to household chores  Achieve functional strength of LUE for full return to high level ADLs               Plan  Patient would benefit from: OT eval, orthotics, skilled occupational therapy and custom splinting  Planned modality interventions: cryotherapy, thermotherapy: hydrocollator packs and unattended electrical stimulation  Planned therapy interventions: patient education, graded exercise, home exercise program, therapeutic exercise, therapeutic activities, stretching, strengthening, functional ROM exercises, flexibility, graded activity, manual therapy, massage, orthotic management and training and orthotic fitting/training  Frequency: 1x-2x/week  Duration in weeks: 6  Plan of Care beginning date: 5/23/2023  Plan of Care expiration date: 7/4/2023  Treatment plan discussed with: patient        Subjective Evaluation    History of Present Illness  Mechanism of injury: Patient is a 71 y o RHD  female who presents for OT IE and treatment for left ring finger tendon  Patient repored pain and symptoms started approximately 2 weeks to the volar side of the palm of the LUE ring finger and demonstrated tenderness when palpated at today's session  Currently patient is using an anti - inflammatory cream as needed to help assist in pain management  Patient denies any trauma or injury to the digit  During today's assessment, patient demonstrated triggering to the A1 pulley of the LUE ring finger tendon when directed to make a tight full composite fist  Patient denies any type of symptoms having occurred prior to 2 weeks ago  Patient has not been seen by her primary care provider or an orthopedic doctor at this time  Patient verbalized to therapist that she wanted to complete therapy prior to seeing the doctor  Therapist discussed in length with the patient regarding conservative and surgical options for potential diagnosis of trigger finger  Patient will be getting in contact with her primary care physician [facility]) regarding getting seen by an orthopedic surgeon for an official diagnosis   Patient was provided education and demonstration on how to properly don and doff an Oval 8 finger splint (size 8) and coban wrap to help assist in minimizing the triggering of the LUE ring finger at this time  Patient was educated on HEP and to get in contact with her PPC  Patient further report that she will be leaving in August to visit family and friends in [facility] and will be returning in September  Patient referred by Dr Glenny Chavez from [address] [place] [address] to initiate treatment including hand therapy  Quality of life: good    Pain  Current pain ratin  At best pain ratin  At worst pain ratin  Quality: discomfort and dull ache  Relieving factors: ice          Objective     Active Range of Motion     Left Wrist   Wrist flexion: WFL  Wrist extension: WFL  Radial deviation: WFL  Ulnar deviation: WFL      Right Wrist   Wrist flexion: WFL  Wrist extension: WFl  Radial deviation: WFL  Ulnar deviation: WFL    Left Digits    Flexion   Ring     MCP: 85    PIP: 94    DIP: 23  Extension   Ring     MCP: 0    PIP: 0    DIP: 0    Right Digits   Flexion   Ring     MCP: 84    PIP: 96    DIP: 82  Extension   Ring     MCP: 0    PIP: 0    DIP: 0    Additional Active Range of Motion Details        Strength/Myotome Testing     Left Wrist/Hand      (2nd hand position)     Trial 1: 60    Right Wrist/Hand      (2nd hand position)     Trial 1: 65            Precautions: Universal      Visit Tracker: No Auth Lyndsey Jernigan  Date   IE                                                                                       Manuals HEP 2023                       Ther Ex     Education on Dx and HEP x x10min   Flexor tendon gliding - active hook fist with blocking each joint x x10, 3 sets   Flexor tendon gliding - active straight fist (only bending the MCPs) x x10, 3 sets   Active MP extension - AAROM stretching, lifting ring finger only x x10, 3 sets        Oval 8 splint/coban wrapping the digit  x x wear schedule as needed for protection and positioning   STM/therapist assisted manual therapy  x5min                                 Modalities     MHP x x5min            Assessment: Patient tolerated session well  Patient demonstrated tenderness to the volar mena surface of the left ring finger and triggering of the A1 pulley when making a tight full composite fist at today's assessment  The patient further demonstrated minimal range of motion deficits of the LUE ring finger compared to the RUE ring finger  Strength is within functional limits  Patient session focused on patient education on anatomy and physiology concerning current dx, techniques for decreasing deficits through HEP, and appropriate use of modalities  Patient educated on HEP with verbal instructions and handouts for patient reference  Patient educated on treatment plan at this time  Patient benefiting from skilled hand therapy OT to reduce deficits to improve independence with daily activities  Plan:   Focus on AROM/flexor tendon glides and stretching to improve ability to complete daily activites with ease   POC: 5/23/2023 - 7/04/2023

## 2023-05-23 NOTE — PROGRESS NOTES
OT Evaluation     Today's date: 2023  Patient name: Yany Trinidad  : 1950  MRN: [id number]  Referring provider: Ekaterina Nelson MD  Dx:   Encounter Diagnosis     ICD-10-CM    1  Pain in finger of left hand  M79 645           Start Time: 1530  Stop Time: 1615  Total time in clinic (min): 45 minutes    Assessment  Assessment details: Patient is a 71 y o RHD  female who presents for OT IE and treatment for left ring finger tendon  Patient repored pain and symptoms started approximately 2 weeks to the volar side of the palm of the LUE ring finger and demonstrated tenderness when palpated at today's session  Currently patient is using an anti - inflammatory cream as needed to help assist in pain management  Patient denies any trauma or injury to the digit  During today's assessment, patient demonstrated triggering to the A1 pulley of the LUE ring finger tendon when directed to make a tight full composite fist  Patient denies any type of symptoms having occurred prior to 2 weeks ago  Patient has not been seen by her primary care provider or an orthopedic doctor at this time  Patient verbalized to therapist that she wanted to complete therapy prior to seeing the doctor  Therapist discussed in length with the patient regarding conservative and surgical options for potential diagnosis of trigger finger  Patient will be getting in contact with her primary care physician [facility]) regarding getting seen by an orthopedic surgeon for an official diagnosis  Patient was provided education and demonstration on how to properly don and doff an Oval 8 finger splint (size 8) and coban wrap to help assist in minimizing the triggering of the LUE ring finger at this time  Patient was educated on HEP and to get in contact with her PPC  Patient further report that she will be leaving in August to visit family and friends in [facility] and will be returning in September   Patient referred by Dr Bari Prado from [facility] to initiate treatment including hand therapy  Impairments: abnormal or restricted ROM, lacks appropriate home exercise program and pain with function    Goals  Short Term Goals by 2 - 4 weeks:    Establish HEP to increase performance with daily activities  Patient will increase ROM of LUE ring finger by at least 10 degrees to complete ADLs  Patient will decrease pain rate of UE by at least 2 points per the VAS scale  Long Term Goals by discharge:    Establish final home exercise program to enhance maximal functional level with ADLs  Achieve functional active range of motion of LUE for full return to household chores  Achieve functional strength of LUE for full return to high level ADLs  Plan  Patient would benefit from: OT eval, orthotics, skilled occupational therapy and custom splinting  Planned modality interventions: cryotherapy, thermotherapy: hydrocollator packs and unattended electrical stimulation  Planned therapy interventions: patient education, graded exercise, home exercise program, therapeutic exercise, therapeutic activities, stretching, strengthening, functional ROM exercises, flexibility, graded activity, manual therapy, massage, orthotic management and training and orthotic fitting/training  Frequency: 1x-2x/week  Duration in weeks: 6  Plan of Care beginning date: 5/23/2023  Plan of Care expiration date: 7/4/2023  Treatment plan discussed with: patient        Subjective Evaluation    History of Present Illness  Mechanism of injury: Patient is a 71 y o RHD  female who presents for OT IE and treatment for left ring finger tendon  Patient repored pain and symptoms started approximately 2 weeks to the volar side of the palm of the LUE ring finger and demonstrated tenderness when palpated at today's session  Currently patient is using an anti - inflammatory cream as needed to help assist in pain management   Patient denies any trauma or injury to the digit  During today's assessment, patient demonstrated triggering to the A1 pulley of the LUE ring finger tendon when directed to make a tight full composite fist  Patient denies any type of symptoms having occurred prior to 2 weeks ago  Patient has not been seen by her primary care provider or an orthopedic doctor at this time  Patient verbalized to therapist that she wanted to complete therapy prior to seeing the doctor  Therapist discussed in length with the patient regarding conservative and surgical options for potential diagnosis of trigger finger  Patient will be getting in contact with her primary care physician [facility]) regarding getting seen by an orthopedic surgeon for an official diagnosis  Patient was provided education and demonstration on how to properly don and doff an Oval 8 finger splint (size 8) and coban wrap to help assist in minimizing the triggering of the LUE ring finger at this time  Patient was educated on HEP and to get in contact with her PPC  Patient further report that she will be leaving in August to visit family and friends in [facility] and will be returning in September  Patient referred by Dr Kelly Cooper from [address] [place] [address] to initiate treatment including hand therapy         Quality of life: good    Pain  Current pain ratin  At best pain ratin  At worst pain ratin  Quality: discomfort and dull ache  Relieving factors: ice          Objective     Active Range of Motion     Left Wrist   Wrist flexion: WFL  Wrist extension: WFL  Radial deviation: WFL  Ulnar deviation: WFL      Right Wrist   Wrist flexion: WFL  Wrist extension: WFl  Radial deviation: WFL  Ulnar deviation: WFL    Left Digits    Flexion   Ring     MCP: 85    PIP: 94    DIP: 23  Extension   Ring     MCP: 0    PIP: 0    DIP: 0    Right Digits   Flexion   Ring     MCP: 84    PIP: 96    DIP: 82  Extension   Ring     MCP: 0    PIP: 0    DIP: 0    Additional Active Range of Motion Details        Strength/Myotome Testing     Left Wrist/Hand      (2nd hand position)     Trial 1: 60    Right Wrist/Hand      (2nd hand position)     Trial 1: 65             Precautions: Universal      Visit Tracker: No Auth Lyndsey Merrill Face  Date 5/23  IE                                                                                       Manuals HEP 5/23/2023                       Ther Ex     Education on Dx and HEP x x10min   Flexor tendon gliding - active hook fist with blocking each joint x x10, 3 sets   Flexor tendon gliding - active straight fist (only bending the MCPs) x x10, 3 sets   Active MP extension - AAROM stretching, lifting ring finger only x x10, 3 sets        Oval 8 splint/coban wrapping the digit  x x wear schedule as needed for protection and positioning   STM/therapist assisted manual therapy  x5min                                 Modalities     MHP x x5min            Assessment:   Patient tolerated session well  Patient demonstrated tenderness to the volar mena surface of the left ring finger and triggering of the A1 pulley when making a tight full composite fist at today's assessment  The patient further demonstrated minimal range of motion deficits of the LUE ring finger compared to the RUE ring finger  Strength is within functional limits  Patient session focused on patient education on anatomy and physiology concerning current dx, techniques for decreasing deficits through HEP, and appropriate use of modalities  Patient educated on HEP with verbal instructions and handouts for patient reference  Patient educated on treatment plan at this time  Patient benefiting from skilled hand therapy OT to reduce deficits to improve independence with daily activities  Plan:   Focus on AROM/flexor tendon glides and stretching to improve ability to complete daily activites with ease   POC: 5/23/2023 - 7/04/2023

## 2024-01-22 ENCOUNTER — EVALUATION (OUTPATIENT)
Dept: PHYSICAL THERAPY | Facility: CLINIC | Age: 74
End: 2024-01-22
Payer: MEDICARE

## 2024-01-22 DIAGNOSIS — R26.81 UNSTEADY GAIT WHEN WALKING: Primary | ICD-10-CM

## 2024-01-22 DIAGNOSIS — R26.89 IMBALANCE: ICD-10-CM

## 2024-01-22 PROCEDURE — 97162 PT EVAL MOD COMPLEX 30 MIN: CPT | Performed by: PHYSICAL THERAPIST

## 2024-01-22 PROCEDURE — 97110 THERAPEUTIC EXERCISES: CPT | Performed by: PHYSICAL THERAPIST

## 2024-01-22 NOTE — PROGRESS NOTES
PT Evaluation     Today's date: 2024  Patient name: Sahara Patel  : 1950  MRN: [id number]  Referring provider: Cathryn Lopez MD  Dx:   Encounter Diagnosis     ICD-10-CM    1. Unsteady gait when walking  R26.81       2. Imbalance  R26.89                      Assessment  Assessment details: Sahara Patel is a 73 y.o. female who presents with complaint of imbalance and difficulty walking that has been present for >6 months.  She also states this has coincided with an increase in numbness in the bottom of her feet.  She states she has seen several specialists including a neurologist who have not found significant spinal component and believes it may be due to diabetes.  She has now been referred to OPPT.  During todays evaluation she demonstrated signs of increased imbalance seen with her dynamic gait index score of 19/24, elevated postural sway with mCTSIB, impaired gait pattern, increased 5x STS, 6 minute walk tests scores above normal age related values and LE weakness especially in hip rotators.  Due to these impairments, patient has difficulty performing ADL's, recreational activities, ambulation, stair negotiation, lifting/carrying, transfers.    Patient has been educated in fall prevention and plan of care. Patient would benefit from skilled physical therapy services to address their aforementioned functional limitations and progress towards prior level of function and independence with home exercise program.   Impairments: abnormal gait, abnormal or restricted ROM, abnormal movement, activity intolerance, impaired balance, impaired physical strength, lacks appropriate home exercise program, poor posture  and poor body mechanics  Understanding of Dx/Px/POC: good   Prognosis: good    Goals  STG 2-4 weeks:  1. Improve 5xSTS score by >4 seconds indicating improve ability to transfer STS.  2. Pt will demonstrate increase DGI score by 1-2 points as evidence of decreased imbalance.  3. Pt will  "be I with basic HEP.    LTG 4-8 weeks:  1. Pt will improve 6 minute walk test by > 250ft as evidence of improved functional mobility  2. Pt will icrease her ABC scale score to >75%  3. Pt will be I with comprehensive HEP.  4. Improve hip strength by 1/2 grade in all deficient planes.      Plan  Plan details: HEP development, stretching, strengthening, A/AA/PROM, joint mobilizations, posture education, STM/MI as needed to reduce muscle tension, balance and proprioceptive training, muscle reeducation, PLOC discussed and agreed upon with patient.    Patient would benefit from: PT eval and skilled physical therapy  Planned modality interventions: cryotherapy and thermotherapy: hydrocollator packs  Planned therapy interventions: manual therapy, neuromuscular re-education, self care, therapeutic activities, therapeutic exercise and home exercise program  Frequency: 2x week  Duration in weeks: 8  Treatment plan discussed with: patient      Subjective Evaluation    History of Present Illness  Mechanism of injury: Sahara Patel reports difficulty when initially walking as she feels stiff and unstable and feeling her legs \"don't react well.\"  She has been seen by several specialists including neurologists who have now referred her to OPPT.    Sahara Patel reports she wakes up with numbness in the plantar aspect of her feet with left > right.  She states it improves as she moves throughout the day it improves.  She also reports her global stiffness is worse in the morning or after being inactive but can improve as she is more active.  She denies any falls at this time.      Chief complaints:  Difficulty walking on uneven surface, more reliance on railing with negotiating stairs, transfer sit to stand after prolonged inactivity.    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     Strength/Myotome Testing     Left Hip   Planes of Motion   Flexion: 4+  External rotation: 4  Internal rotation: 4-    Right Hip   Planes of Motion   Flexion: 4+  External rotation: 4-  Internal rotation: 4-    Left Knee   Flexion: 4+  Extension: 5    Right Knee   Flexion: 4+  Extension: 5    Left Ankle/Foot   Dorsiflexion: 5  Plantar flexion: 5    Right Ankle/Foot   Dorsiflexion: 5  Plantar flexion: 5    Ambulation     Ambulation: Stairs   Ascend stairs: independent  Pattern: reciprocal  Railings: one rail  Descend stairs: independent  Pattern: reciprocal  Railings: one rail    Additional Stairs Ambulation Details  Pt reports she is unable to carry objects up and down stairs.    Observational Gait     Additional Observational Gait Details  Patient demonstrated evidence of deviation side to side intermittently.  She also demonstrates intermittent shuffling of feet L>R due to poor foot clearance.  Her LLE stays in greater hip ER throughout her gait.    DGI  MDC: vestibular - 4 pts  MDC: geriatric/community - 3 pts  Falls risk <19/24 5xSTS: Lucila et al 2010  MDC: 2.3 sec  Age Norms:  60-69: 11.1 sec  70-79: 12.6 sec  80-89: 14.8 sec   6 Minute Walk Test  Age Norms  60-69: M - 1876 ft (571.80 m)  F - 1765 ft (537.98 m)  70-79: M - 1729 ft (527.00 m)  F - 1545 ft (470.92 m)  80-89: M - 1368 ft (416.97 m)  F - 1286 ft (391.97 m) mCTSIB  Norm: 20-60 yrs  Eyes open firm: norm sway 0.21-0.48  Eyes closed firm: norm sway 0.48-0.99  Eyes open foam: norm sway 0.38-0.71  Eyes closed foam: norm sway 0.70-2.22        Outcome Measures Initial Eval            5xSTS 22.06s          mCTSIB  - FTEO (firm)  - FTEC (firm)  - FTEO (foam)  - FTEC (foam) 0.94  1.28  1.38  2.75 w/ 1 grab of HR  Composite:1.58          6MWT 1100ft          2MWT 375ft          DGI 19/24         ABC  70.6%                       Precautions: Imbalance/Falls Risk       Insurance:  A/CMS Eval/ Re-eval Auth #/ Referral # Total units  Start date  Expiration date Extension  Visit limitation?  PT only or  PT+OT? Co-Insurance   CMS 1/22/24 NA       100% after deduct                 POC Start Date POC Expiration Date Signed POC?   1/22/24 3/18/24 Pend      Date               Visits/Units:  Used               Authed:  Remaining                     Manuals   1/22      IE                     Neuro Re-Ed                                                Ther Ex      Education   Falls prevention                                             Ther Activity                  Gait Training                  Modalities

## 2024-01-22 NOTE — LETTER
2024    Cathryn Lopez MD  [facility]    Patient: Sahara Patel   YOB: 1950   Date of Visit: 2024     Encounter Diagnosis     ICD-10-CM    1. Unsteady gait when walking  R26.81       2. Imbalance  R26.89           Dear Dr. Lopez:    Thank you for your recent referral of Sahara Patel. Please review the attached evaluation summary from Sahara's recent visit.     Please verify that you agree with the plan of care by signing the attached order.     If you have any questions or concerns, please do not hesitate to call.     I sincerely appreciate the opportunity to share in the care of one of your patients and hope to have another opportunity to work with you in the near future.       Sincerely,    Devon Robles, PT      Referring Provider:      I certify that I have read the below Plan of Care and certify the need for these services furnished under this plan of treatment while under my care.                    Cathryn Lopez MD  [facility]  Via Fax: [phone]          PT Evaluation     Today's date: 2024  Patient name: Sahara Patel  : 1950  MRN: [id number]  Referring provider: Cathryn Lopez MD  Dx:   Encounter Diagnosis     ICD-10-CM    1. Unsteady gait when walking  R26.81       2. Imbalance  R26.89                      Assessment  Assessment details: Sahara Patel is a 73 y.o. female who presents with complaint of imbalance and difficulty walking that has been present for >6 months.  She also states this has coincided with an increase in numbness in the bottom of her feet.  She states she has seen several specialists including a neurologist who have not found significant spinal component and believes it may be due to diabetes.  She has now been referred to OPPT.  During todays evaluation she demonstrated signs of increased imbalance seen with her dynamic gait index score of 19/24, elevated  postural sway with mCTSIB, impaired gait pattern, increased 5x STS, 6 minute walk tests scores above normal age related values and LE weakness especially in hip rotators.  Due to these impairments, patient has difficulty performing ADL's, recreational activities, ambulation, stair negotiation, lifting/carrying, transfers.    Patient has been educated in fall prevention and plan of care. Patient would benefit from skilled physical therapy services to address their aforementioned functional limitations and progress towards prior level of function and independence with home exercise program.   Impairments: abnormal gait, abnormal or restricted ROM, abnormal movement, activity intolerance, impaired balance, impaired physical strength, lacks appropriate home exercise program, poor posture  and poor body mechanics  Understanding of Dx/Px/POC: good   Prognosis: good    Goals  STG 2-4 weeks:  1. Improve 5xSTS score by >4 seconds indicating improve ability to transfer STS.  2. Pt will demonstrate increase DGI score by 1-2 points as evidence of decreased imbalance.  3. Pt will be I with basic HEP.    LTG 4-8 weeks:  1. Pt will improve 6 minute walk test by > 250ft as evidence of improved functional mobility  2. Pt will icrease her ABC scale score to >75%  3. Pt will be I with comprehensive HEP.  4. Improve hip strength by 1/2 grade in all deficient planes.      Plan  Plan details: HEP development, stretching, strengthening, A/AA/PROM, joint mobilizations, posture education, STM/MI as needed to reduce muscle tension, balance and proprioceptive training, muscle reeducation, PLOC discussed and agreed upon with patient.    Patient would benefit from: PT eval and skilled physical therapy  Planned modality interventions: cryotherapy and thermotherapy: hydrocollator packs  Planned therapy interventions: manual therapy, neuromuscular re-education, self care, therapeutic activities, therapeutic exercise and home exercise  "program  Frequency: 2x week  Duration in weeks: 8  Treatment plan discussed with: patient      Subjective Evaluation    History of Present Illness  Mechanism of injury: Sahara Patel reports difficulty when initially walking as she feels stiff and unstable and feeling her legs \"don't react well.\"  She has been seen by several specialists including neurologists who have now referred her to OPPT.    Sahara Patel reports she wakes up with numbness in the plantar aspect of her feet with left > right.  She states it improves as she moves throughout the day it improves.  She also reports her global stiffness is worse in the morning or after being inactive but can improve as she is more active.  She denies any falls at this time.      Chief complaints:  Difficulty walking on uneven surface, more reliance on railing with negotiating stairs, transfer sit to stand after prolonged inactivity.    Patient Goals  Patient goals for therapy: improved balance, increased strength and independence with ADLs/IADLs    Social Support  Steps to enter house: yes  4  Stairs in house: yes   4  Lives in: multiple-level home  Lives with: significant other    Employment status: not working  Exercise history: Pilates - 1-2x/wk        Objective     Postural Observations  Seated posture: fair  Standing posture: fair      Neurological Testing     Sensation     Lumbar   Left   Diminished: light touch    Right   Diminished: light touch    Additional Neurological Details  Evidence of 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     Strength/Myotome Testing     Left Hip   Planes of Motion   Flexion: 4+  External rotation: 4  Internal rotation: 4-    Right Hip   Planes of Motion   Flexion: 4+  External rotation: 4-  Internal rotation: 4-    Left Knee   Flexion: 4+  Extension: 5    Right Knee   Flexion: 4+  Extension: 5    Left Ankle/Foot   Dorsiflexion: 5  Plantar flexion: 5    Right Ankle/Foot   Dorsiflexion: 5  Plantar flexion: 5    Ambulation "     Ambulation: Stairs   Ascend stairs: independent  Pattern: reciprocal  Railings: one rail  Descend stairs: independent  Pattern: reciprocal  Railings: one rail    Additional Stairs Ambulation Details  Pt reports she is unable to carry objects up and down stairs.    Observational Gait     Additional Observational Gait Details  Patient demonstrated evidence of deviation side to side intermittently.  She also demonstrates intermittent shuffling of feet L>R due to poor foot clearance.  Her LLE stays in greater hip ER throughout her gait.    DGI  MDC: vestibular - 4 pts  MDC: geriatric/community - 3 pts  Falls risk <19/24 5xSTS: Lucila et al 2010  MDC: 2.3 sec  Age Norms:  60-69: 11.1 sec  70-79: 12.6 sec  80-89: 14.8 sec   6 Minute Walk Test  Age Norms  60-69: M - 1876 ft (571.80 m)  F - 1765 ft (537.98 m)  70-79: M - 1729 ft (527.00 m)  F - 1545 ft (470.92 m)  80-89: M - 1368 ft (416.97 m)  F - 1286 ft (391.97 m) mCTSIB  Norm: 20-60 yrs  Eyes open firm: norm sway 0.21-0.48  Eyes closed firm: norm sway 0.48-0.99  Eyes open foam: norm sway 0.38-0.71  Eyes closed foam: norm sway 0.70-2.22       Outcome Measures Initial Eval            5xSTS 22.06s          mCTSIB  - FTEO (firm)  - FTEC (firm)  - FTEO (foam)  - FTEC (foam) 0.94  1.28  1.38  2.75 w/ 1 grab of HR  Composite:1.58          6MWT 1100ft          2MWT 375ft          DGI 19/24         ABC  70.6%                       Precautions: Imbalance/Falls Risk       Insurance:  AMA/CMS Eval/ Re-eval Auth #/ Referral # Total units  Start date  Expiration date Extension  Visit limitation?  PT only or  PT+OT? Co-Insurance   CMS 1/22/24 NA       100% after deduct                 POC Start Date POC Expiration Date Signed POC?   1/22/24 3/18/24 Pend      Date               Visits/Units:  Used               Authed:  Remaining                     Manuals   1/22      IE                     Neuro Re-Ed                                                Ther Ex      Education   Falls  prevention                                             Ther Activity                  Gait Training                  Modalities

## 2024-01-29 ENCOUNTER — OFFICE VISIT (OUTPATIENT)
Dept: PHYSICAL THERAPY | Facility: CLINIC | Age: 74
End: 2024-01-29
Payer: MEDICARE

## 2024-01-29 DIAGNOSIS — R26.81 UNSTEADY GAIT WHEN WALKING: Primary | ICD-10-CM

## 2024-01-29 DIAGNOSIS — R26.89 IMBALANCE: ICD-10-CM

## 2024-01-29 PROCEDURE — 97112 NEUROMUSCULAR REEDUCATION: CPT | Performed by: PHYSICAL THERAPIST

## 2024-01-29 PROCEDURE — 97110 THERAPEUTIC EXERCISES: CPT | Performed by: PHYSICAL THERAPIST

## 2024-01-29 NOTE — PROGRESS NOTES
Daily Note     Today's date: 2024  Patient name: Sahara Patel  : 1950  MRN: [id number]  Referring provider: Cathryn Lopez MD  Dx:   Encounter Diagnosis     ICD-10-CM    1. Unsteady gait when walking  R26.81       2. Imbalance  R26.89                      Subjective: Sahara Patel reports she continues with feeling of imbalance.      Objective: See treatment diary below      Assessment: Tolerated treatment well. Patient demod difficulty controlling full descent during stand to sit transfer.  She also requires extensive cues for proper performance and recall of her exercises.      Plan: Continue per plan of care.      Precautions: Imbalance/Falls Risk    Access Code: [email or web address]  URL: [email or web address]  Date: 2024  Prepared by: Devon Robles    Exercises  - Standing Hip Flexion  - 1 x daily - 7 x weekly - 2 sets - 10 reps  - Side Stepping with Counter Support  - 1 x daily - 7 x weekly - 1 sets - 10 reps  - Sit to Stand with Hands on Knees  - 1 x daily - 7 x weekly - 2 sets - 10 reps  - Seated Hip Internal/External Rotation  - 1 x daily - 7 x weekly - 2 sets - 10 reps - 3 hold  - Standing Romberg to 1/2 Tandem Stance  - 1 x daily - 7 x weekly - 3 sets - 30 hold     Insurance:  A/CMS Eval/ Re-eval Auth #/ Referral # Total units  Start date  Expiration date Extension  Visit limitation?  PT only or  PT+OT? Co-Insurance   CMS 24 NA       100% after deduct                 POC Start Date POC Expiration Date Signed POC?   1/22/24 3/18/24 Pend      Date               Visits/Units:  Used               Authed:  Remaining                     Manuals       2 IE                     Neuro Re-Ed      STS  Training + 10x    SLS hip flexion  2x15 B    Side step  On airex beam  [id number] +  No beam [id number]    1/2 romberg w/EC  3x30s B                      Ther Ex      Education   Falls prevention   Seated hip IR/ER  2x10 B    HEP  initiated                                  Ther  Activity                  Gait Training                  Modalities

## 2024-02-05 ENCOUNTER — OFFICE VISIT (OUTPATIENT)
Dept: PHYSICAL THERAPY | Facility: CLINIC | Age: 74
End: 2024-02-05
Payer: MEDICARE

## 2024-02-05 DIAGNOSIS — R26.89 IMBALANCE: ICD-10-CM

## 2024-02-05 DIAGNOSIS — R26.81 UNSTEADY GAIT WHEN WALKING: Primary | ICD-10-CM

## 2024-02-05 PROCEDURE — 97112 NEUROMUSCULAR REEDUCATION: CPT | Performed by: PHYSICAL THERAPIST

## 2024-02-05 NOTE — PROGRESS NOTES
Daily Note     Today's date: 2024  Patient name: Sahara Patel  : 1950  MRN: [id number]  Referring provider: Cathryn Lopez MD  Dx:   Encounter Diagnosis     ICD-10-CM    1. Unsteady gait when walking  R26.81       2. Imbalance  R26.89                      Subjective: Patient states she continues to go to attend Pilates classes.  Patient reports she feels that her LE muscles are getting stronger and working more effectively. Patient states she continues to pay attention to how she transitions from sit to stand while at home.      Objective: See treatment diary below      Assessment: Tolerated treatment well. Patient demonstrated fatigue post treatment while requiring verbal cues for proper body mechanics with transfers and weight shifting during balance activity.        Plan: Continue per plan of care.      Precautions: Imbalance/Falls Risk    Access Code: [email or web address]  URL: [email or web address]  Date: 2024  Prepared by: Devon Robles    Exercises  - Standing Hip Flexion  - 1 x daily - 7 x weekly - 2 sets - 10 reps  - Side Stepping with Counter Support  - 1 x daily - 7 x weekly - 1 sets - 10 reps  - Sit to Stand with Hands on Knees  - 1 x daily - 7 x weekly - 2 sets - 10 reps  - Seated Hip Internal/External Rotation  - 1 x daily - 7 x weekly - 2 sets - 10 reps - 3 hold  - Standing Romberg to 1/2 Tandem Stance  - 1 x daily - 7 x weekly - 3 sets - 30 hold     Insurance:  A/CMS Eval/ Re-eval Auth #/ Referral # Total units  Start date  Expiration date Extension  Visit limitation?  PT only or  PT+OT? Co-Insurance   CMS 24 NA       100% after deduct                 POC Start Date POC Expiration Date Signed POC?   1/22/24 3/18/24 Pend      Date               Visits/Units:  Used               Authed:  Remaining                     Manuals     3 2 IE                     Neuro Re-Ed      STS 10x  Training + 10x    SLS hip flexion 2x15 B 2x15 B    Side step  On airex  beam  [id number] +  No beam [id number]    1/2 romberg w/EC Romberg w/EC 2 min on airex + 3x30s B    Romberg on airex Head turns   H+V  20x ea                 Ther Ex      Education   Falls prevention   Seated hip IR/ER  2x10 B    HEP  initiated

## 2024-02-07 ENCOUNTER — OFFICE VISIT (OUTPATIENT)
Dept: PHYSICAL THERAPY | Facility: CLINIC | Age: 74
End: 2024-02-07
Payer: MEDICARE

## 2024-02-07 DIAGNOSIS — R26.81 UNSTEADY GAIT WHEN WALKING: Primary | ICD-10-CM

## 2024-02-07 DIAGNOSIS — R26.89 IMBALANCE: ICD-10-CM

## 2024-02-07 PROCEDURE — 97110 THERAPEUTIC EXERCISES: CPT | Performed by: PHYSICAL THERAPIST

## 2024-02-07 PROCEDURE — 97112 NEUROMUSCULAR REEDUCATION: CPT | Performed by: PHYSICAL THERAPIST

## 2024-02-07 NOTE — PROGRESS NOTES
Daily Note     Today's date: 2024  Patient name: Sahara Patel  : 1950  MRN: [id number]  Referring provider: Cathryn Lopez MD  Dx:   Encounter Diagnosis     ICD-10-CM    1. Unsteady gait when walking  R26.81       2. Imbalance  R26.89                      Subjective: Sahara reports maggy LE soreness after pilates class yesterday. Pt states she notices she is more off balance when walking compared to when she is standing still.       Objective: See treatment diary below      Assessment: Tolerated treatment well. Patient is demonstrating improvement in static balance however continues to have dynamic balance impairments contributing to inc fall risk. Progressed HEP and reviewed exercises with pt demonstrating good understanding of how to perform balance activities safely at home. Patient exhibited good technique with therapeutic exercises and would benefit from continued PT      Plan: Continue per plan of care.  Progress treatment as tolerated.       Precautions: Imbalance/Falls Risk    Access Code: [email or web address]  URL: [email or web address]  Date: 2024  Prepared by: Devon Robles    Exercises  - Standing Hip Flexion  - 1 x daily - 7 x weekly - 2 sets - 10 reps  - Side Stepping with Counter Support  - 1 x daily - 7 x weekly - 1 sets - 10 reps  - Sit to Stand with Hands on Knees  - 1 x daily - 7 x weekly - 2 sets - 10 reps  - Seated Hip Internal/External Rotation  - 1 x daily - 7 x weekly - 2 sets - 10 reps - 3 hold  - Standing Tandem Balance with Counter Support  - 1 x daily - 7 x weekly - 3 sets - 10 reps  - Tandem Walking with Counter Support  - 1 x daily - 7 x weekly - 3 sets - 10 reps  - Backward Tandem Walking with Counter Support  - 1 x daily - 7 x weekly - 3 sets - 10 reps  - Walking with Head Rotation  - 1 x daily - 7 x weekly - 3 sets - 10 reps     Insurance:  AMA/CMS Eval/ Re-eval Auth #/ Referral # Total units  Start date  Expiration date Extension  Visit limitation?  PT only  "or  PT+OT? Co-Insurance   CMS 1/22/24 NA       100% after deduct                 POC Start Date POC Expiration Date Signed POC?   1/22/24 3/18/24 Pend      Date               Visits/Units:  Used               Authed:  Remaining                     Manuals 2/7 2/5 1/29 1/22    4 3 2 IE                        Neuro Re-Ed       STS 10x 10x  Training + 10x    SLS hip flexion  2x15 B 2x15 B    Side step   On airex beam  [id number] +  No beam [id number]    1/2 romberg w/EC  Romberg w/EC 2 min on airex + 3x30s B    Tandem stance 30\" 5x      Romberg on airex Head turns H+V 20ea, ball toss 20x Head turns   H+V  20x ea     Tandem walking Forward 15ft 10x at railing      Walking with head turns Fwd H+V 15ft 10x ea at railing      Retro walking 15ft 10x at railing      Ther Ex       Education    Falls prevention   Seated hip IR/ER   2x10 B    HEP progressed  initiated                                                       Patient treated by Lynda Fierro, SPT under my direct supervision.        "

## 2024-02-12 ENCOUNTER — OFFICE VISIT (OUTPATIENT)
Dept: PHYSICAL THERAPY | Facility: CLINIC | Age: 74
End: 2024-02-12
Payer: MEDICARE

## 2024-02-12 DIAGNOSIS — R26.89 IMBALANCE: ICD-10-CM

## 2024-02-12 DIAGNOSIS — R26.81 UNSTEADY GAIT WHEN WALKING: Primary | ICD-10-CM

## 2024-02-12 PROCEDURE — 97112 NEUROMUSCULAR REEDUCATION: CPT | Performed by: PHYSICAL THERAPIST

## 2024-02-12 NOTE — PROGRESS NOTES
Daily Note     Today's date: 2024  Patient name: Sahara Patel  : 1950  MRN: [id number]  Referring provider: Cathryn Lopez MD  Dx:   Encounter Diagnosis     ICD-10-CM    1. Unsteady gait when walking  R26.81       2. Imbalance  R26.89           Start Time: 1020  Stop Time: 1100  Total time in clinic (min): 40 minutes    Subjective: Sahara reports she did not get to work on HEP this weekend however was very active with grandchildren.       Objective: See treatment diary below      Assessment: Tolerated treatment well. Pt demonstrated increased difficulty with dynamic balance exercises on compliant surfaces and with increased single leg stance time during charles exercises. Pt demonstrated mild-mod sway and slowed gait speed with horizontal VOR walking and walking with horizontal + vertical head turns; instructed pt to continue working on these exercises at home at countertop/near surface for UE support as needed. Patient demonstrated fatigue post treatment, exhibited good technique with therapeutic exercises, and would benefit from continued PT      Plan: Continue per plan of care.  Progress treatment as tolerated.       Precautions: Imbalance/Falls Risk    Access Code: [email or web address]  URL: [email or web address]  Date: 2024  Prepared by: Devon Robles    Exercises  - Standing Hip Flexion  - 1 x daily - 7 x weekly - 2 sets - 10 reps  - Side Stepping with Counter Support  - 1 x daily - 7 x weekly - 1 sets - 10 reps  - Sit to Stand with Hands on Knees  - 1 x daily - 7 x weekly - 2 sets - 10 reps  - Seated Hip Internal/External Rotation  - 1 x daily - 7 x weekly - 2 sets - 10 reps - 3 hold  - Standing Tandem Balance with Counter Support  - 1 x daily - 7 x weekly - 3 sets - 10 reps  - Tandem Walking with Counter Support  - 1 x daily - 7 x weekly - 3 sets - 10 reps  - Backward Tandem Walking with Counter Support  - 1 x daily - 7 x weekly - 3 sets - 10 reps  - Walking with Head Rotation  - 1  "x daily - 7 x weekly - 3 sets - 10 reps     Insurance:  AMA/CMS Eval/ Re-eval Auth #/ Referral # Total units  Start date  Expiration date Extension  Visit limitation?  PT only or  PT+OT? Co-Insurance   CMS 1/22/24 NA       100% after deduct                 POC Start Date POC Expiration Date Signed POC?   1/22/24 3/18/24 Pend      Date               Visits/Units:  Used               Authed:  Remaining                     Manuals 2/12 2/7 2/5 1/29 1/22    5 4 3 2 IE                           Neuro Re-Ed        STS  10x 10x  Training + 10x    SLS hip flexion   2x15 B 2x15 B    Side step    On airex beam  [id number] +  No beam [id number]    1/2 romberg w/EC   Romberg w/EC 2 min on airex + 3x30s B    Tandem stance  30\" 5x      Romberg on airex  Head turns H+V 20ea, ball toss 20x Head turns   H+V  20x ea     Tandem walking On airex beam 10x Forward 15ft 10x at railing      Walking with head turns 25ft H+V w/o railing, close supervision Fwd H+V 15ft 10x ea at railing      Retro walking  15ft 10x at railing      Mini squats 20x at railing        Hurdles Fwd w/ airex between at railing 10x       Walking with VOR Fwd walking w/o railing + close supervision, horizontal        Trunk rotation w/ ball pass Standing on airex 20x with PT        Ther Ex        Education     Falls prevention   Seated hip IR/ER    2x10 B    HEP  progressed  initiated                                                              Patient treated by Lynda Fierro, SPT under my direct supervision.          "

## 2024-02-14 ENCOUNTER — OFFICE VISIT (OUTPATIENT)
Dept: PHYSICAL THERAPY | Facility: CLINIC | Age: 74
End: 2024-02-14
Payer: MEDICARE

## 2024-02-14 DIAGNOSIS — R26.89 IMBALANCE: ICD-10-CM

## 2024-02-14 DIAGNOSIS — R26.81 UNSTEADY GAIT WHEN WALKING: Primary | ICD-10-CM

## 2024-02-14 PROCEDURE — 97112 NEUROMUSCULAR REEDUCATION: CPT | Performed by: PHYSICAL THERAPIST

## 2024-02-14 NOTE — PROGRESS NOTES
Daily Note     Today's date: 2024  Patient name: Sahara Patel  : 1950  MRN: [id number]  Referring provider: Cathryn Lopez MD  Dx:   Encounter Diagnosis     ICD-10-CM    1. Unsteady gait when walking  R26.81       2. Imbalance  R26.89                      Subjective: Sahara reports soreness in lateral calf that increases following PT sessions.       Objective: See treatment diary below      Assessment: Tolerated treatment well. Pt required cueing during squats at railing to correct foot positioning and reduce pronation/equalize weight distribution through foot. Pt had mild soreness around lateral aspect of knee/fibular head; presented with tenderness to palpation along peroneals upon assessment. Suspect soreness to be due to overuse of peroneals during balance activities due to tendency to carin/DF with weight maintained posteriorly on heels; next visit will continue to assess foot and ankle positioning on compliant surfaces and modify as appropriate.  Patient demonstrated fatigue post treatment and would benefit from continued PT      Plan: Continue per plan of care.  Progress treatment as tolerated.       Precautions: Imbalance/Falls Risk    Access Code: [email or web address]  URL: [email or web address]  Date: 2024  Prepared by: Devon Robles    Exercises  - Standing Hip Flexion  - 1 x daily - 7 x weekly - 2 sets - 10 reps  - Side Stepping with Counter Support  - 1 x daily - 7 x weekly - 1 sets - 10 reps  - Sit to Stand with Hands on Knees  - 1 x daily - 7 x weekly - 2 sets - 10 reps  - Seated Hip Internal/External Rotation  - 1 x daily - 7 x weekly - 2 sets - 10 reps - 3 hold  - Standing Tandem Balance with Counter Support  - 1 x daily - 7 x weekly - 3 sets - 10 reps  - Tandem Walking with Counter Support  - 1 x daily - 7 x weekly - 3 sets - 10 reps  - Backward Tandem Walking with Counter Support  - 1 x daily - 7 x weekly - 3 sets - 10 reps  - Walking with Head Rotation  - 1 x daily - 7  "x weekly - 3 sets - 10 reps     Insurance:  AMA/CMS Eval/ Re-eval Auth #/ Referral # Total units  Start date  Expiration date Extension  Visit limitation?  PT only or  PT+OT? Co-Insurance   CMS 1/22/24 NA       100% after deduct                 POC Start Date POC Expiration Date Signed POC?   1/22/24 3/18/24 Pend      Date               Visits/Units:  Used               Authed:  Remaining                     Manuals 2/14 2/12 2/7 2/5 1/29 1/22    6 5 4 3 2 IE                              Neuro Re-Ed         STS   10x 10x  Training + 10x    SLS hip flexion    2x15 B 2x15 B    Side step     On airex beam  70m34ie +  No beam 23m48gm    1/2 romberg w/EC    Romberg w/EC 2 min on airex + 3x30s B    Tandem stance   30\" 5x      Romberg on airex   Head turns H+V 20ea, ball toss 20x Head turns   H+V  20x ea     Tandem walking On airex beam 15ft 10x On airex beam 10x Forward 15ft 10x at railing      Walking with head turns  25ft H+V w/o railing, close supervision Fwd H+V 15ft 10x ea at railing      Retro walking 20ft 10x no railing, close supervision  15ft 10x at railing      Mini squats 20x at railing on TB plates 20x at railing        Hurdles Fwd w/ airex between 1HR 10x Fwd w/ airex between at railing 10x       Walking with VOR  Fwd walking w/o railing + close supervision, horizontal        Trunk rotation w/ ball pass  Standing on airex 20x with PT        Ther Ex         Education      Falls prevention   Seated hip IR/ER     2x10 B    HEP   progressed  initiated                                                                     Patient treated by Lynda Fierro, SPT under my direct supervision.            "

## 2024-02-19 ENCOUNTER — OFFICE VISIT (OUTPATIENT)
Dept: PHYSICAL THERAPY | Facility: CLINIC | Age: 74
End: 2024-02-19
Payer: MEDICARE

## 2024-02-19 DIAGNOSIS — R26.89 IMBALANCE: ICD-10-CM

## 2024-02-19 DIAGNOSIS — R26.81 UNSTEADY GAIT WHEN WALKING: Primary | ICD-10-CM

## 2024-02-19 PROCEDURE — 97112 NEUROMUSCULAR REEDUCATION: CPT | Performed by: PHYSICAL THERAPIST

## 2024-02-19 NOTE — PROGRESS NOTES
Daily Note     Today's date: 2024  Patient name: Sahara Patel  : 1950  MRN: [id number]  Referring provider: Cathryn Lopez MD  Dx:   Encounter Diagnosis     ICD-10-CM    1. Unsteady gait when walking  R26.81       2. Imbalance  R26.89           Start Time: 1020  Stop Time: 1100  Total time in clinic (min): 40 minutes    Subjective: Sahara reports continuing to experience mild discomfort along lateral aspect of L knee/calf. Pt states she wants to be able to return to balance classes at [facility].       Objective: See treatment diary below      Assessment: Tolerated treatment well. Pt was able to demonstrate mini squats without UE assistance today with improved weight distribution in maggy feet and good eccentric control. Discussed pt goals of being able to participate in [employer] classes without modifications and to be able to attend balance focused class. Incorporated standing on bosu ball with UE support and cues for LE mechanics to improve pt confidence and challenge balance on compliant surface. Patient demonstrated fatigue post treatment, exhibited good technique with therapeutic exercises, and would benefit from continued PT      Plan: Continue per plan of care.  Progress treatment as tolerated.       Precautions: Imbalance/Falls Risk    Access Code: [email or web address]  URL: [email or web address]  Date: 2024  Prepared by: Devon Robles    Exercises  - Standing Hip Flexion  - 1 x daily - 7 x weekly - 2 sets - 10 reps  - Side Stepping with Counter Support  - 1 x daily - 7 x weekly - 1 sets - 10 reps  - Sit to Stand with Hands on Knees  - 1 x daily - 7 x weekly - 2 sets - 10 reps  - Seated Hip Internal/External Rotation  - 1 x daily - 7 x weekly - 2 sets - 10 reps - 3 hold  - Standing Tandem Balance with Counter Support  - 1 x daily - 7 x weekly - 3 sets - 10 reps  - Tandem Walking with Counter Support  - 1 x daily - 7 x weekly - 3 sets - 10 reps  - Backward Tandem Walking with Counter  "Support  - 1 x daily - 7 x weekly - 3 sets - 10 reps  - Walking with Head Rotation  - 1 x daily - 7 x weekly - 3 sets - 10 reps     Insurance:  AMA/CMS Eval/ Re-eval Auth #/ Referral # Total units  Start date  Expiration date Extension  Visit limitation?  PT only or  PT+OT? Co-Insurance   CMS 1/22/24 NA       100% after deduct                 POC Start Date POC Expiration Date Signed POC?   1/22/24 3/18/24 Pend      Date               Visits/Units:  Used               Authed:  Remaining                     Manuals 2/19 2/14 2/12 2/7 2/5    7 6 5 4 3                           Neuro Re-Ed        STS    10x 10x    SLS hip flexion     2x15 B   Side step        1/2 romberg w/EC     Romberg w/EC 2 min on airex +   Tandem stance    30\" 5x    Romberg on airex    Head turns H+V 20ea, ball toss 20x Head turns   H+V  20x ea   Side stepping on airex beam 15ft 10x at table        Tandem walking On airex beam 15ft 10x On airex beam 15ft 10x On airex beam 10x Forward 15ft 10x at railing    Walking with head turns   25ft H+V w/o railing, close supervision Fwd H+V 15ft 10x ea at railing    Retro walking  20ft 10x no railing, close supervision  15ft 10x at railing    Mini squats 30x no railing  20x at railing on TB plates 20x at railing      Hurdles  Fwd w/ airex between 1HR 10x Fwd w/ airex between at railing 10x     Walking with VOR   Fwd walking w/o railing + close supervision, horizontal      Trunk rotation w/ ball pass   Standing on airex 20x with PT      Standing on bosu  At railing 20s 5x with UE support        Mini lateral lunge  On airex 2x10       Ther Ex        Education        Seated hip IR/ER        HEP    progressed                                                              Patient treated by Lynda Fierro, SPT under my direct supervision.              "

## 2024-02-21 ENCOUNTER — EVALUATION (OUTPATIENT)
Dept: PHYSICAL THERAPY | Facility: CLINIC | Age: 74
End: 2024-02-21
Payer: MEDICARE

## 2024-02-21 DIAGNOSIS — R26.81 UNSTEADY GAIT WHEN WALKING: Primary | ICD-10-CM

## 2024-02-21 DIAGNOSIS — R26.89 IMBALANCE: ICD-10-CM

## 2024-02-21 PROCEDURE — 97110 THERAPEUTIC EXERCISES: CPT | Performed by: PHYSICAL THERAPIST

## 2024-02-21 NOTE — PROGRESS NOTES
PT Re-Evaluation     Today's date: 2024  Patient name: Sahara Patel  : 1950  MRN: [id number]  Referring provider: Cathryn Lopez MD  Dx:   Encounter Diagnosis     ICD-10-CM    1. Unsteady gait when walking  R26.81       2. Imbalance  R26.89           Start Time: 1400  Stop Time: 1448  Total time in clinic (min): 48 minutes      Assessment  Current assessment details: At progress note, Sahara Patel demonstrates improvement in 5xSTS, 6MWT, 2MWT, and DGI to  following challenging all balance systems over past few weeks of skilled treatment. While functional assessment scores have improved, she continues to present with scores outside of age related normal values and demonstrates signs of impaired balance with increased postural sway on compliant surfaces due to impaired proprioception bilaterally, altered gait speed and mild disruption of smooth gait path with H+V head turns, and slight change in gait velocity when preparing to clear obstacles. Pt additionally continues to demonstrate mild gait deviations contributing to increased risk of falls including decreased foot clearance/shuffling and narrow base of support. Sahara Patel would continue to benefit from skilled physical therapy intervention to further challenge all balance systems, especially focusing on proprioception, to improve safety in her home and in the community and promote return to PLOF, confidence, and activity in order to improve overall QOL.       Impairments: abnormal gait, abnormal or restricted ROM, abnormal movement, activity intolerance, impaired balance, impaired physical strength, lacks appropriate home exercise program, poor posture  and poor body mechanics  Understanding of Dx/Px/POC: good   Prognosis: good    Goals  STG 2-4 weeks:  1. Improve 5xSTS score by >4 seconds indicating improve ability to transfer STS. met  2. Pt will demonstrate increase DGI score by 1-2 points as evidence of decreased imbalance.  met  3. Pt will be I with basic HEP. met    LTG 4-8 weeks:  1. Pt will improve 6 minute walk test by > 250ft as evidence of improved functional mobility not met   2. Pt will icrease her ABC scale score to >75%  3. Pt will be I with comprehensive HEP. In progress   4. Improve hip strength by 1/2 grade in all deficient planes. In progress       Plan  Plan details: HEP development, stretching, strengthening, A/AA/PROM, joint mobilizations, posture education, STM/MI as needed to reduce muscle tension, balance and proprioceptive training, muscle reeducation, PLOC discussed and agreed upon with patient.    Patient would benefit from: PT eval and skilled physical therapy  Planned modality interventions: cryotherapy and thermotherapy: hydrocollator packs  Planned therapy interventions: manual therapy, neuromuscular re-education, self care, therapeutic activities, therapeutic exercise and home exercise program  Frequency: 2x week  Duration in weeks: 8  Treatment plan discussed with: patient      Subjective Evaluation    History of Present Illness  Current Subjective: Sahara Patel reports she is feeling more confident to try new balance class in [facility]. Pt states she feels like she is doing a bit better with walking overall and doesn't always feel like she needs the railing when on the stairs. Pt reports she continues to feel like it takes a while for her to feel firm/stable on the ground when transitioning from prolonged sitting into standing/walking.       Chief complaints:  Difficulty walking on uneven surface, transfer sit to stand after prolonged inactivity.    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     Strength/Myotome Testing     Left Hip   Planes of Motion   Flexion: 4+   External rotation: 4  Internal rotation: 4    Right Hip   Planes of Motion   Flexion: 4+  External rotation: 4  Internal rotation: 4-    Left Knee   Flexion: 4+  Extension: 5    Right Knee   Flexion: 4+  Extension: 5    Left Ankle/Foot   Dorsiflexion: 5  Plantar flexion: 5    Right Ankle/Foot   Dorsiflexion: 5  Plantar flexion: 5    Ambulation     Ambulation: Stairs   Ascend stairs: independent  Pattern: reciprocal  Railings: one rail  Descend stairs: independent  Pattern: reciprocal  Railings: one rail    Additional Stairs Ambulation Details  Pt reports she hasn't attempted carrying heavy objects up and down full flight of stairs however is nervous to try and requests help when she has to bring something heavy upstairs; is able to carry groceries up a few steps into house    Observational Gait     Additional Observational Gait Details  Intermittent lateral sway, shuffling of feet L>R due to reduced foot clearance  Increased external rotation in L LE through gait cycle   Shortened step length         DGI  MDC: vestibular - 4 pts  MDC: geriatric/community - 3 pts  Falls risk <19/24 5xSTS: Lucila et al 2010  MDC: 2.3 sec  Age Norms:  60-69: 11.1 sec  70-79: 12.6 sec  80-89: 14.8 sec   6 Minute Walk Test  Age Norms  60-69: M - 1876 ft (571.80 m)  F - 1765 ft (537.98 m)  70-79: M - 1729 ft (527.00 m)  F - 1545 ft (470.92 m)  80-89: M - 1368 ft (416.97 m)  F - 1286 ft (391.97 m) mCTSIB  Norm: 20-60 yrs  Eyes open firm: norm sway 0.21-0.48  Eyes closed firm: norm sway 0.48-0.99  Eyes open foam: norm sway 0.38-0.71  Eyes closed foam: norm sway 0.70-2.22       Outcome Measures Initial  "Eval   2/21/2024          5xSTS 22.06s 17.00 1LOB         mCTSIB  - FTEO (firm)  - FTEC (firm)  - FTEO (foam)  - FTEC (foam) 0.94  1.28  1.38  2.75 w/ 1 grab of HR  Composite:1.58 0.61  1.27  1.50  2.77 w/ 1 grab of HR    Composite: 1.53         6MWT 1100ft 1275ft         2MWT 375ft 440ft         DGI 19/24 21/24        ABC  70.6%  57.5%                      Precautions: Imbalance/Falls Risk    Access Code: [email or web address]  URL: [email or web address]  Date: 02/07/2024  Prepared by: Devon Robles    Exercises  - Standing Hip Flexion  - 1 x daily - 7 x weekly - 2 sets - 10 reps  - Side Stepping with Counter Support  - 1 x daily - 7 x weekly - 1 sets - 10 reps  - Sit to Stand with Hands on Knees  - 1 x daily - 7 x weekly - 2 sets - 10 reps  - Seated Hip Internal/External Rotation  - 1 x daily - 7 x weekly - 2 sets - 10 reps - 3 hold  - Standing Tandem Balance with Counter Support  - 1 x daily - 7 x weekly - 3 sets - 10 reps  - Tandem Walking with Counter Support  - 1 x daily - 7 x weekly - 3 sets - 10 reps  - Backward Tandem Walking with Counter Support  - 1 x daily - 7 x weekly - 3 sets - 10 reps  - Walking with Head Rotation  - 1 x daily - 7 x weekly - 3 sets - 10 reps     Insurance:  A/CMS Eval/ Re-eval Auth #/ Referral # Total units  Start date  Expiration date Extension  Visit limitation?  PT only or  PT+OT? Co-Insurance   CMS 1/22/24 NA       100% after deduct    2/21/24 NA            POC Start Date POC Expiration Date Signed POC?   1/22/24 3/18/24 Pend   2/21/2024 4/17/2024 Pending       Date               Visits/Units:  Used               Authed:  Remaining                     Manuals 2/21 2/19 2/14 2/12 2/7    8 - reeval  7 6 5 4                           Neuro Re-Ed        STS     10x   SLS hip flexion        Side step        1/2 romberg w/EC        Tandem stance     30\" 5x   Romberg on airex     Head turns H+V 20ea, ball toss 20x   Side stepping on airex beam  15ft 10x at table       Tandem walking  " On airex beam 15ft 10x On airex beam 15ft 10x On airex beam 10x Forward 15ft 10x at railing   Walking with head turns    25ft H+V w/o railing, close supervision Fwd H+V 15ft 10x ea at railing   Retro walking   20ft 10x no railing, close supervision  15ft 10x at railing   Mini squats  30x no railing  20x at railing on TB plates 20x at railing     Hurdles   Fwd w/ airex between 1HR 10x Fwd w/ airex between at railing 10x    Walking with VOR    Fwd walking w/o railing + close supervision, horizontal     Trunk rotation w/ ball pass    Standing on airex 20x with PT     Standing on bosu   At railing 20s 5x with UE support       Mini lateral lunge   On airex 2x10      Ther Ex        Education Updates to POC       Seated hip IR/ER        HEP     progressed   DGI Performed        5xSTS Performed        mCTSIB Performed        6MWT Performed        2MWT Performed                          Patient treated by YOLI Bee under my direct supervision.

## 2024-02-21 NOTE — LETTER
2024    Cathryn Lopez MD  [facility]    Patient: Sahara Patel   YOB: 1950   Date of Visit: 2024     Encounter Diagnosis     ICD-10-CM    1. Unsteady gait when walking  R26.81       2. Imbalance  R26.89           Dear Dr. Lopez:    Thank you for your recent referral of Sahara Patel. Please review the attached evaluation summary from Sahara's recent visit.     Please verify that you agree with the plan of care by signing the attached order.     If you have any questions or concerns, please do not hesitate to call.     I sincerely appreciate the opportunity to share in the care of one of your patients and hope to have another opportunity to work with you in the near future.       Sincerely,    Devon Robles, PT      Referring Provider:      I certify that I have read the below Plan of Care and certify the need for these services furnished under this plan of treatment while under my care.                    Cathryn Lopez MD  [facility]  Via Fax: [phone]          PT Re-Evaluation     Today's date: 2024  Patient name: Sahara Patel  : 1950  MRN: [id number]  Referring provider: Cathryn Lopez MD  Dx:   Encounter Diagnosis     ICD-10-CM    1. Unsteady gait when walking  R26.81       2. Imbalance  R26.89           Start Time: 1400  Stop Time: 1448  Total time in clinic (min): 48 minutes      Assessment  Current assessment details: At progress note, Sahara Patel demonstrates improvement in 5xSTS, 6MWT, 2MWT, and DGI to  following challenging all balance systems over past few weeks of skilled treatment. While functional assessment scores have improved, she continues to present with scores outside of age related normal values and demonstrates signs of impaired balance with increased postural sway on compliant surfaces due to impaired proprioception bilaterally, altered gait speed and mild  disruption of smooth gait path with H+V head turns, and slight change in gait velocity when preparing to clear obstacles. Pt additionally continues to demonstrate mild gait deviations contributing to increased risk of falls including decreased foot clearance/shuffling and narrow base of support. Sahara Patel would continue to benefit from skilled physical therapy intervention to further challenge all balance systems, especially focusing on proprioception, to improve safety in her home and in the community and promote return to PLOF, confidence, and activity in order to improve overall QOL.       Impairments: abnormal gait, abnormal or restricted ROM, abnormal movement, activity intolerance, impaired balance, impaired physical strength, lacks appropriate home exercise program, poor posture  and poor body mechanics  Understanding of Dx/Px/POC: good   Prognosis: good    Goals  STG 2-4 weeks:  1. Improve 5xSTS score by >4 seconds indicating improve ability to transfer STS. met  2. Pt will demonstrate increase DGI score by 1-2 points as evidence of decreased imbalance. met  3. Pt will be I with basic HEP. met    LTG 4-8 weeks:  1. Pt will improve 6 minute walk test by > 250ft as evidence of improved functional mobility not met   2. Pt will icrease her ABC scale score to >75%  3. Pt will be I with comprehensive HEP. In progress   4. Improve hip strength by 1/2 grade in all deficient planes. In progress       Plan  Plan details: HEP development, stretching, strengthening, A/AA/PROM, joint mobilizations, posture education, STM/MI as needed to reduce muscle tension, balance and proprioceptive training, muscle reeducation, PLOC discussed and agreed upon with patient.    Patient would benefit from: PT eval and skilled physical therapy  Planned modality interventions: cryotherapy and thermotherapy: hydrocollator packs  Planned therapy interventions: manual therapy, neuromuscular re-education, self care, therapeutic  activities, therapeutic exercise and home exercise program  Frequency: 2x week  Duration in weeks: 8  Treatment plan discussed with: patient      Subjective Evaluation    History of Present Illness  Current Subjective: Sahara Patel reports she is feeling more confident to try new balance class in [facility]. Pt states she feels like she is doing a bit better with walking overall and doesn't always feel like she needs the railing when on the stairs. Pt reports she continues to feel like it takes a while for her to feel firm/stable on the ground when transitioning from prolonged sitting into standing/walking.       Chief complaints:  Difficulty walking on uneven surface, transfer sit to stand after prolonged inactivity.    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     Strength/Myotome Testing     Left Hip   Planes of Motion   Flexion: 4+   External rotation: 4  Internal rotation: 4    Right Hip   Planes of Motion   Flexion: 4+  External rotation: 4  Internal rotation: 4-    Left Knee   Flexion: 4+  Extension: 5    Right Knee   Flexion: 4+  Extension: 5    Left Ankle/Foot   Dorsiflexion: 5  Plantar flexion: 5    Right Ankle/Foot   Dorsiflexion: 5  Plantar flexion: 5    Ambulation     Ambulation: Stairs   Ascend stairs: independent  Pattern: reciprocal  Railings: one rail  Descend stairs: independent  Pattern: reciprocal  Railings: one rail    Additional  Stairs Ambulation Details  Pt reports she hasn't attempted carrying heavy objects up and down full flight of stairs however is nervous to try and requests help when she has to bring something heavy upstairs; is able to carry groceries up a few steps into house    Observational Gait     Additional Observational Gait Details  Intermittent lateral sway, shuffling of feet L>R due to reduced foot clearance  Increased external rotation in L LE through gait cycle   Shortened step length         DGI  MDC: vestibular - 4 pts  MDC: geriatric/community - 3 pts  Falls risk <19/24 5xSTS: Lucila et al 2010  MDC: 2.3 sec  Age Norms:  60-69: 11.1 sec  70-79: 12.6 sec  80-89: 14.8 sec   6 Minute Walk Test  Age Norms  60-69: M - 1876 ft (571.80 m)  F - 1765 ft (537.98 m)  70-79: M - 1729 ft (527.00 m)  F - 1545 ft (470.92 m)  80-89: M - 1368 ft (416.97 m)  F - 1286 ft (391.97 m) mCTSIB  Norm: 20-60 yrs  Eyes open firm: norm sway 0.21-0.48  Eyes closed firm: norm sway 0.48-0.99  Eyes open foam: norm sway 0.38-0.71  Eyes closed foam: norm sway 0.70-2.22       Outcome Measures Initial Eval   2/21/2024          5xSTS 22.06s 17.00 1LOB         mCTSIB  - FTEO (firm)  - FTEC (firm)  - FTEO (foam)  - FTEC (foam) 0.94  1.28  1.38  2.75 w/ 1 grab of HR  Composite:1.58 0.61  1.27  1.50  2.77 w/ 1 grab of HR    Composite: 1.53         6MWT 1100ft 1275ft         2MWT 375ft 440ft         DGI 19/24 21/24        ABC  70.6%  57.5%                      Precautions: Imbalance/Falls Risk    Access Code: [email or web address]  URL: [email or web address]  Date: 02/07/2024  Prepared by: Devon Robles    Exercises  - Standing Hip Flexion  - 1 x daily - 7 x weekly - 2 sets - 10 reps  - Side Stepping with Counter Support  - 1 x daily - 7 x weekly - 1 sets - 10 reps  - Sit to Stand with Hands on Knees  - 1 x daily - 7 x weekly - 2 sets - 10 reps  - Seated Hip Internal/External Rotation  - 1 x daily - 7 x weekly - 2 sets - 10 reps - 3 hold  - Standing Tandem  "Balance with Counter Support  - 1 x daily - 7 x weekly - 3 sets - 10 reps  - Tandem Walking with Counter Support  - 1 x daily - 7 x weekly - 3 sets - 10 reps  - Backward Tandem Walking with Counter Support  - 1 x daily - 7 x weekly - 3 sets - 10 reps  - Walking with Head Rotation  - 1 x daily - 7 x weekly - 3 sets - 10 reps     Insurance:  AMA/CMS Eval/ Re-eval Auth #/ Referral # Total units  Start date  Expiration date Extension  Visit limitation?  PT only or  PT+OT? Co-Insurance   CMS 1/22/24 NA       100% after deduct    2/21/24 NA            POC Start Date POC Expiration Date Signed POC?   1/22/24 3/18/24 Pend   2/21/2024 4/17/2024 Pending       Date               Visits/Units:  Used               Authed:  Remaining                     Manuals 2/21 2/19 2/14 2/12 2/7    8 - reeval  7 6 5 4                           Neuro Re-Ed        STS     10x   SLS hip flexion        Side step        1/2 romberg w/EC        Tandem stance     30\" 5x   Romberg on airex     Head turns H+V 20ea, ball toss 20x   Side stepping on airex beam  15ft 10x at table       Tandem walking  On airex beam 15ft 10x On airex beam 15ft 10x On airex beam 10x Forward 15ft 10x at railing   Walking with head turns    25ft H+V w/o railing, close supervision Fwd H+V 15ft 10x ea at railing   Retro walking   20ft 10x no railing, close supervision  15ft 10x at railing   Mini squats  30x no railing  20x at railing on TB plates 20x at railing     Hurdles   Fwd w/ airex between 1HR 10x Fwd w/ airex between at railing 10x    Walking with VOR    Fwd walking w/o railing + close supervision, horizontal     Trunk rotation w/ ball pass    Standing on airex 20x with PT     Standing on bosu   At railing 20s 5x with UE support       Mini lateral lunge   On airex 2x10      Ther Ex        Education Updates to POC       Seated hip IR/ER        HEP     progressed   DGI Performed        5xSTS Performed        mCTSIB Performed        6MWT Performed        2MWT Performed   "                        Patient treated by YOLI Bee under my direct supervision.          Attestation with edits by Devon Robles, PT at 2/21/2024  7:45 PM:  I supervised the visit.  We discussed the case to ensure appropriate continuation and progression of care and I reviewed the documentation.

## 2024-02-26 ENCOUNTER — OFFICE VISIT (OUTPATIENT)
Dept: PHYSICAL THERAPY | Facility: CLINIC | Age: 74
End: 2024-02-26
Payer: MEDICARE

## 2024-02-26 DIAGNOSIS — R26.89 IMBALANCE: ICD-10-CM

## 2024-02-26 DIAGNOSIS — R26.81 UNSTEADY GAIT WHEN WALKING: Primary | ICD-10-CM

## 2024-02-26 PROCEDURE — 97112 NEUROMUSCULAR REEDUCATION: CPT | Performed by: PHYSICAL THERAPIST

## 2024-02-26 NOTE — PROGRESS NOTES
Daily Note     Today's date: 2024  Patient name: Sahara Patel  : 1950  MRN: [id number]  Referring provider: Cathryn Lopez MD  Dx:   Encounter Diagnosis     ICD-10-CM    1. Unsteady gait when walking  R26.81       2. Imbalance  R26.89                      Subjective: Sahara Patel reports she had trouble getting on and off floor playing with grand kids.  She also continues to report she feels she scuffs her feet while walking.      Objective: See treatment diary below      Assessment: Tolerated treatment well. Patient with trouble with balance during lateral step over charles on airex and with tandem balance.  She however demos good balance reactions to external pertubations from PT or reaching for ball outside YOL[facility].      Plan: Continue per plan of care.      Precautions: Imbalance/Falls Risk    Access Code: [email or web address]  URL: [email or web address]  Date: 2024  Prepared by: Devon Robles    Exercises  - Standing Hip Flexion  - 1 x daily - 7 x weekly - 2 sets - 10 reps  - Side Stepping with Counter Support  - 1 x daily - 7 x weekly - 1 sets - 10 reps  - Sit to Stand with Hands on Knees  - 1 x daily - 7 x weekly - 2 sets - 10 reps  - Seated Hip Internal/External Rotation  - 1 x daily - 7 x weekly - 2 sets - 10 reps - 3 hold  - Standing Tandem Balance with Counter Support  - 1 x daily - 7 x weekly - 3 sets - 10 reps  - Tandem Walking with Counter Support  - 1 x daily - 7 x weekly - 3 sets - 10 reps  - Backward Tandem Walking with Counter Support  - 1 x daily - 7 x weekly - 3 sets - 10 reps  - Walking with Head Rotation  - 1 x daily - 7 x weekly - 3 sets - 10 reps     Insurance:  AMA/CMS Eval/ Re-eval Auth #/ Referral # Total units  Start date  Expiration date Extension  Visit limitation?  PT only or  PT+OT? Co-Insurance   CMS 24 NA       100% after deduct    24 NA            POC Start Date POC Expiration Date Signed POC?   1/22/24 3/18/24 Pend   2024 Pending        Date               Visits/Units:  Used               Authed:  Remaining                     Manuals 2/26 2/21 2/19 2/14 2/12    9 8 - reeval  7 6 5                           Neuro Re-Ed        STS        SLS hip flexion        Side step        1/2 romberg w/EC W/perturbations from PT  3x60s       Tandem stance 3x30s B       Romberg on airex While playing catch from PT  2x2 min       Side stepping on airex beam   15ft 10x at table      Tandem walking   On airex beam 15ft 10x On airex beam 15ft 10x On airex beam 10x   Walking with head turns     25ft H+V w/o railing, close supervision   Retro walking Fwd/retro over 3 hurdles at table  20x   20ft 10x no railing, close supervision    Mini squats   30x no railing  20x at railing on TB plates 20x at railing    Hurdles Lateral over 1 charles on airex pads  2 UE support  2x10   Fwd w/ airex between 1HR 10x Fwd w/ airex between at railing 10x   Walking with VOR     Fwd walking w/o railing + close supervision, horizontal    Trunk rotation w/ ball pass     Standing on airex 20x with PT    Standing on bosu    At railing 20s 5x with UE support      Mini lateral lunge    On airex 2x10     Ther Ex        Education  Updates to POC      Seated hip IR/ER        HEP        DGI  Performed       5xSTS  Performed       mCTSIB  Performed       6MWT  Performed       2MWT  Performed

## 2024-02-28 ENCOUNTER — OFFICE VISIT (OUTPATIENT)
Dept: PHYSICAL THERAPY | Facility: CLINIC | Age: 74
End: 2024-02-28
Payer: MEDICARE

## 2024-02-28 DIAGNOSIS — R26.81 UNSTEADY GAIT WHEN WALKING: Primary | ICD-10-CM

## 2024-02-28 DIAGNOSIS — R26.89 IMBALANCE: ICD-10-CM

## 2024-02-28 PROCEDURE — 97112 NEUROMUSCULAR REEDUCATION: CPT | Performed by: PHYSICAL THERAPIST

## 2024-02-28 NOTE — PROGRESS NOTES
Daily Note     Today's date: 2024  Patient name: Sahara Patel  : 1950  MRN: [id number]  Referring provider: Cathryn Lopez MD  Dx:   Encounter Diagnosis     ICD-10-CM    1. Unsteady gait when walking  R26.81       2. Imbalance  R26.89           Start Time: 1105  Stop Time: 1145  Total time in clinic (min): 40 minutes    Subjective: Sahara reports going to help her daughter yesterday and feeling like she was able to go up/down stairs and play with grandchildren without feeling unstable.      Objective: See treatment diary below      Assessment: Tolerated treatment well. Sahara demonstrated difficulty with tandem balance with EC and stepping over hurdles on compliant surface. Integrated dynamic balance exercise on [employer] to incorporate exercises pt would experience in pilates classes; pt had mild difficulty and required maggy UE support to maintain balance.  Patient demonstrated fatigue post treatment, exhibited good technique with therapeutic exercises, and would benefit from continued PT      Plan: Continue per plan of care.  Progress treatment as tolerated.       Precautions: Imbalance/Falls Risk    Access Code: [email or web address]  URL: [email or web address]  Date: 2024  Prepared by: Devon Robles    Exercises  - Standing Hip Flexion  - 1 x daily - 7 x weekly - 2 sets - 10 reps  - Side Stepping with Counter Support  - 1 x daily - 7 x weekly - 1 sets - 10 reps  - Sit to Stand with Hands on Knees  - 1 x daily - 7 x weekly - 2 sets - 10 reps  - Seated Hip Internal/External Rotation  - 1 x daily - 7 x weekly - 2 sets - 10 reps - 3 hold  - Standing Tandem Balance with Counter Support  - 1 x daily - 7 x weekly - 3 sets - 10 reps  - Tandem Walking with Counter Support  - 1 x daily - 7 x weekly - 3 sets - 10 reps  - Backward Tandem Walking with Counter Support  - 1 x daily - 7 x weekly - 3 sets - 10 reps  - Walking with Head Rotation  - 1 x daily - 7 x weekly - 3 sets - 10 reps      Insurance:  AMA/CMS Eval/ Re-eval Auth #/ Referral # Total units  Start date  Expiration date Extension  Visit limitation?  PT only or  PT+OT? Co-Insurance   CMS 1/22/24 NA       100% after deduct    2/21/24 NA            POC Start Date POC Expiration Date Signed POC?   1/22/24 3/18/24 Pend   2/21/2024 4/17/2024 Pending       Date               Visits/Units:  Used               Authed:  Remaining                     Manuals 2/28 2/26 2/21 2/19 2/14    10 9 8 - reeval  7 6                           Neuro Re-Ed        STS        SLS hip flexion        Side step        1/2 romberg w/EC  W/perturbations from PT  3x60s      Tandem stance 3x30s ea with EC 3x30s B      Romberg on airex 3x1 min while playing catch with PT  While playing catch from PT  2x2 min      Side stepping on airex beam    15ft 10x at table     Tandem walking    On airex beam 15ft 10x On airex beam 15ft 10x   Walking with head turns        Retro walking Fwd/retro over 3 hurdles at railing 20x Fwd/retro over 3 hurdles at table  20x   20ft 10x no railing, close supervision   Mini squats    30x no railing  20x at railing on TB plates   Hurdles Lateral over 2 hurdles on airex beam at railing 2x10    Fwd over 2 hurdlges on airex beam at railing 1x10 Lateral over 1 charles on airex pads  2 UE support  2x10   Fwd w/ airex between 1HR 10x   Walking with VOR        Trunk rotation w/ ball pass        Standing on bosu  At railing flat side up 1min with UE support    At railing 20s 5x with UE support     Mini lunges On bosu 1x10 each       Mini lateral lunge     On airex 2x10    Ther Ex        Education   Updates to POC     Seated hip IR/ER        HEP        DGI   Performed      5xSTS   Performed      mCTSIB   Performed      6MWT   Performed      2MWT   Performed                          Patient treated by [facility] Bee under my direct supervision.

## 2024-03-04 ENCOUNTER — OFFICE VISIT (OUTPATIENT)
Dept: PHYSICAL THERAPY | Facility: CLINIC | Age: 74
End: 2024-03-04
Payer: MEDICARE

## 2024-03-04 DIAGNOSIS — R26.89 IMBALANCE: ICD-10-CM

## 2024-03-04 DIAGNOSIS — R26.81 UNSTEADY GAIT WHEN WALKING: Primary | ICD-10-CM

## 2024-03-04 PROCEDURE — 97112 NEUROMUSCULAR REEDUCATION: CPT | Performed by: PHYSICAL THERAPIST

## 2024-03-04 NOTE — PROGRESS NOTES
Daily Note     Today's date: 3/4/2024  Patient name: Sahara Patel  : 1950  MRN: [id number]  Referring provider: Cathryn Lopez MD  Dx:   Encounter Diagnosis     ICD-10-CM    1. Unsteady gait when walking  R26.81       2. Imbalance  R26.89           Start Time: 1148  Stop Time: 1230  Total time in clinic (min): 42 minutes    Subjective: Sahara reports she had a very active weekend and felt like she needed move fairly slow to maintain balance and feel comfortable moving around.      Objective: See treatment diary below      Assessment: Tolerated treatment well. Pt continues to require UE support for balance activities on compliant surfaces due to proprioceptive impairment. Pt showed improvement with reactive balance ball toss exercise with minimal LOB. Pt required cues to maintain slight knee flexion positioning and correct weight distribution through foot to reduce posterior weight shift during bosu ball exercise with pt demonstrating improved balance following correction. Patient demonstrated fatigue post treatment and would benefit from continued PT      Plan: Continue per plan of care.  Progress treatment as tolerated.       Precautions: Imbalance/Falls Risk    Access Code: [email or web address]  URL: [email or web address]  Date: 2024  Prepared by: Devon Robles    Exercises  - Standing Hip Flexion  - 1 x daily - 7 x weekly - 2 sets - 10 reps  - Side Stepping with Counter Support  - 1 x daily - 7 x weekly - 1 sets - 10 reps  - Sit to Stand with Hands on Knees  - 1 x daily - 7 x weekly - 2 sets - 10 reps  - Seated Hip Internal/External Rotation  - 1 x daily - 7 x weekly - 2 sets - 10 reps - 3 hold  - Standing Tandem Balance with Counter Support  - 1 x daily - 7 x weekly - 3 sets - 10 reps  - Tandem Walking with Counter Support  - 1 x daily - 7 x weekly - 3 sets - 10 reps  - Backward Tandem Walking with Counter Support  - 1 x daily - 7 x weekly - 3 sets - 10 reps  - Walking with Head Rotation  -  1 x daily - 7 x weekly - 3 sets - 10 reps     Insurance:  AMA/CMS Eval/ Re-eval Auth #/ Referral # Total units  Start date  Expiration date Extension  Visit limitation?  PT only or  PT+OT? Co-Insurance   CMS 1/22/24 NA       100% after deduct    2/21/24 NA            POC Start Date POC Expiration Date Signed POC?   1/22/24 3/18/24 Pend   2/21/2024 4/17/2024 Pending       Date               Visits/Units:  Used               Authed:  Remaining                     Manuals 3/4 2/28 2/26 2/21 2/19    11 10 9 8 - reeval  7                           Neuro Re-Ed        STS        SLS hip flexion        Side step        1/2 romberg w/EC   W/perturbations from PT  3x60s     Tandem stance 2x1min with ball toss  3x30s ea with EC 3x30s B     Romberg on airex 3x1 min while playing catch with PT 3x1 min while playing catch with PT  While playing catch from PT  2x2 min     Side stepping on airex beam     15ft 10x at table    Tandem walking     On airex beam 15ft 10x   Walking with head turns        Retro walking  Fwd/retro over 3 hurdles at railing 20x Fwd/retro over 3 hurdles at table  20x     Mini squats     30x no railing    Hurdles Lateral over 2 hurdles on airex beam at table [id number]    Fwd over 2 hurdles on airex beam at railing 1x10 Lateral over 2 hurdles on airex beam at railing 2x10    Fwd over 2 hurdlges on airex beam at railing 1x10 Lateral over 1 charles on airex pads  2 UE support  2x10     Walking with VOR        Trunk rotation w/ ball pass        Standing on bosu   At railing flat side up 1min with UE support    At railing 20s 5x with UE support    Mini lunges On bosu 2x10 each On bosu 1x10 each      Mini lateral lunge      On airex 2x10   Ther Ex        Education    Updates to POC    Seated hip IR/ER        HEP        DGI    Performed     5xSTS    Performed     mCTSIB    Performed     6MWT    Performed     2MWT    Performed                         Patient treated by Lynda Fierro, SPT under my direct  supervision.

## 2024-03-11 ENCOUNTER — OFFICE VISIT (OUTPATIENT)
Dept: PHYSICAL THERAPY | Facility: CLINIC | Age: 74
End: 2024-03-11
Payer: MEDICARE

## 2024-03-11 DIAGNOSIS — R26.89 IMBALANCE: ICD-10-CM

## 2024-03-11 DIAGNOSIS — R26.81 UNSTEADY GAIT WHEN WALKING: Primary | ICD-10-CM

## 2024-03-11 PROCEDURE — 97112 NEUROMUSCULAR REEDUCATION: CPT

## 2024-03-11 NOTE — PROGRESS NOTES
Daily Note     Today's date: 3/11/2024  Patient name: Sahara Patel  : 1950  MRN: [id number]  Referring provider: Cathryn Lopez MD  Dx:   Encounter Diagnosis     ICD-10-CM    1. Unsteady gait when walking  R26.81       2. Imbalance  R26.89           Start Time: 1146  Stop Time: 1228  Total time in clinic (min): 42 minutes    Subjective: Sahara reports she had surgical procedure on her left hand last week and has been pretty fatigued from the anesthesia.       Objective: See treatment diary below      Assessment: Tolerated treatment well. Sahara presented today L wrist/hand wrapped due to post-operative status and mild fatigue following anesthesia event. Pt had difficulty with retro walking through cones and demonstrated significant shuffling of maggy feet with shortened step length. Pt continues to require single UE support for balance activities and has most difficulty maintaining balance when challenged on compliant surfaces and with narrow [facility]. Patient demonstrated fatigue post treatment, exhibited good technique with therapeutic exercises, and would benefit from continued PT      Plan: Continue per plan of care.  Progress treatment as tolerated.       Precautions: Imbalance/Falls Risk    Access Code: [email or web address]  URL: [email or web address]  Date: 2024  Prepared by: Devon Robles    Exercises  - Standing Hip Flexion  - 1 x daily - 7 x weekly - 2 sets - 10 reps  - Side Stepping with Counter Support  - 1 x daily - 7 x weekly - 1 sets - 10 reps  - Sit to Stand with Hands on Knees  - 1 x daily - 7 x weekly - 2 sets - 10 reps  - Seated Hip Internal/External Rotation  - 1 x daily - 7 x weekly - 2 sets - 10 reps - 3 hold  - Standing Tandem Balance with Counter Support  - 1 x daily - 7 x weekly - 3 sets - 10 reps  - Tandem Walking with Counter Support  - 1 x daily - 7 x weekly - 3 sets - 10 reps  - Backward Tandem Walking with Counter Support  - 1 x daily - 7 x weekly - 3 sets - 10 reps  -  Walking with Head Rotation  - 1 x daily - 7 x weekly - 3 sets - 10 reps     Insurance:  AMA/CMS Eval/ Re-eval Auth #/ Referral # Total units  Start date  Expiration date Extension  Visit limitation?  PT only or  PT+OT? Co-Insurance   CMS 1/22/24 NA       100% after deduct    2/21/24 NA            POC Start Date POC Expiration Date Signed POC?   1/22/24 3/18/24 Pend   2/21/2024 4/17/2024 Pending       Date               Visits/Units:  Used               Authed:  Remaining                     Manuals 3/11 3/4 2/28 2/26 2/21 2/19    12 11 10 9 8 - reeval  7                              Neuro Re-Ed         STS         SLS hip flexion Multidirectional cone taps on airex 1UE support        Side step         1/2 romberg w/EC    W/perturbations from PT  3x60s     Tandem stance  2x1min with ball toss  3x30s ea with EC 3x30s B     Romberg on airex Horizontal + vertical head turns 20x ea 3x1 min while playing catch with PT 3x1 min while playing catch with PT  While playing catch from PT  2x2 min     Side stepping on airex beam      15ft 10x at table    Tandem walking On airex at table fwd/retro 10x     On airex beam 15ft 10x   Walking with head turns         Retro walking Fwd/retro over 2 hurdles at table 20x      Fwd/retro over 3 hurdles at railing 20x Fwd/retro over 3 hurdles at table  20x     Mini squats      30x no railing    Hurdles Lateral over 2 hurdles on airex beam at table [id number] Lateral over 2 hurdles on airex beam at table [id number]    Fwd over 2 hurdles on airex beam at railing 1x10 Lateral over 2 hurdles on airex beam at railing 2x10    Fwd over 2 hurdlges on airex beam at railing 1x10 Lateral over 1 charles on airex pads  2 UE support  2x10     Walking with VOR         Trunk rotation w/ ball pass         Standing on bosu    At railing flat side up 1min with UE support    At railing 20s 5x with UE support    Mini lunges  On bosu 2x10 each On bosu 1x10 each      Mini lateral lunge       On airex 2x10   Wobble board   A-P 20x 1UE support   M-L 10x 1UE support        Weaving  4 cones fwd/retro 4x each        Ther Ex         Education     Updates to POC    Seated hip IR/ER         HEP         DGI     Performed     5xSTS     Performed     mCTSIB     Performed     6MWT     Performed     2MWT     Performed                           Patient treated by [facility] Bee under my direct supervision.

## 2024-03-13 ENCOUNTER — APPOINTMENT (OUTPATIENT)
Dept: PHYSICAL THERAPY | Facility: CLINIC | Age: 74
End: 2024-03-13
Payer: MEDICARE

## 2024-03-14 ENCOUNTER — OFFICE VISIT (OUTPATIENT)
Dept: PHYSICAL THERAPY | Facility: CLINIC | Age: 74
End: 2024-03-14
Payer: MEDICARE

## 2024-03-14 DIAGNOSIS — R26.89 IMBALANCE: ICD-10-CM

## 2024-03-14 DIAGNOSIS — R26.81 UNSTEADY GAIT WHEN WALKING: Primary | ICD-10-CM

## 2024-03-14 PROCEDURE — 97112 NEUROMUSCULAR REEDUCATION: CPT | Performed by: PHYSICAL THERAPIST

## 2024-03-14 NOTE — PROGRESS NOTES
Daily Note     Today's date: 3/14/2024  Patient name: Sahara Patel  : 1950  MRN: [id number]  Referring provider: Cathryn Lopez MD  Dx:   Encounter Diagnosis     ICD-10-CM    1. Unsteady gait when walking  R26.81       2. Imbalance  R26.89                      Subjective: Sahara Patel reports she has felt a little unstable with initially walking over the past week since her hand surgery.      Objective: See treatment diary below      Assessment: Tolerated treatment well. Patient with fatigue in LEs throughout session and evidence of imbalance with challenge of VOR on horizontal plane while walking vs vertical head movements.  She continues with overall increased risk of fall due to imbalance and reduced balance reactions.      Plan: Continue per plan of care.      Precautions: Imbalance/Falls Risk    Access Code: [email or web address]  URL: [email or web address]  Date: 2024  Prepared by: Devon Robles    Exercises  - Standing Hip Flexion  - 1 x daily - 7 x weekly - 2 sets - 10 reps  - Side Stepping with Counter Support  - 1 x daily - 7 x weekly - 1 sets - 10 reps  - Sit to Stand with Hands on Knees  - 1 x daily - 7 x weekly - 2 sets - 10 reps  - Seated Hip Internal/External Rotation  - 1 x daily - 7 x weekly - 2 sets - 10 reps - 3 hold  - Standing Tandem Balance with Counter Support  - 1 x daily - 7 x weekly - 3 sets - 10 reps  - Tandem Walking with Counter Support  - 1 x daily - 7 x weekly - 3 sets - 10 reps  - Backward Tandem Walking with Counter Support  - 1 x daily - 7 x weekly - 3 sets - 10 reps  - Walking with Head Rotation  - 1 x daily - 7 x weekly - 3 sets - 10 reps     Insurance:  AMA/CMS Eval/ Re-eval Auth #/ Referral # Total units  Start date  Expiration date Extension  Visit limitation?  PT only or  PT+OT? Co-Insurance   CMS 24 NA       100% after deduct    24 NA            POC Start Date POC Expiration Date Signed POC?   1/22/24 3/18/24 Pend   2024  Pending       Date               Visits/Units:  Used               Authed:  Remaining                     Manuals 3/14 3/11 3/4 2/28 2/26    13 12 11 10 9                           Neuro Re-Ed        STS 2x10       SLS hip flexion 3-way cone tap  2x10 B Multidirectional cone taps on airex 1UE support      Side step        1/2 romberg w/EC     W/perturbations from PT  3x60s   Tandem stance   2x1min with ball toss  3x30s ea with EC 3x30s B   Romberg on airex  Horizontal + vertical head turns 20x ea 3x1 min while playing catch with PT 3x1 min while playing catch with PT  While playing catch from PT  2x2 min   Side stepping on airex beam [id number] w/ intermittent 1 UE support on table       Tandem walking  On airex at table fwd/retro 10x      Retro walking  Fwd/retro over 2 hurdles at table 20x      Fwd/retro over 3 hurdles at railing 20x Fwd/retro over 3 hurdles at table  20x   Hurdles  Lateral over 2 hurdles on airex beam at table [id number] Lateral over 2 hurdles on airex beam at table [id number]    Fwd over 2 hurdles on airex beam at railing 1x10 Lateral over 2 hurdles on airex beam at railing 2x10    Fwd over 2 hurdlges on airex beam at railing 1x10 Lateral over 1 charles on airex pads  2 UE support  2x10   Walking with VOR Horizontal  -5x60ft +  Vertical 1x60ft       Standing on bosu     At railing flat side up 1min with UE support     Mini lunges 2x10 B on bosu w/ 1UE support  On bosu 2x10 each On bosu 1x10 each    Wobble board   A-P 20x 1UE support   M-L 10x 1UE support      Weaving   4 cones fwd/retro 4x each      Ther Ex        Education        Seated hip IR/ER        HEP        DGI        5xSTS        mCTSIB        6MWT        2MWT                            Patient treated by [facility] Bee under my direct supervision.

## 2024-03-18 ENCOUNTER — OFFICE VISIT (OUTPATIENT)
Dept: PHYSICAL THERAPY | Facility: CLINIC | Age: 74
End: 2024-03-18
Payer: MEDICARE

## 2024-03-18 DIAGNOSIS — R26.89 IMBALANCE: ICD-10-CM

## 2024-03-18 DIAGNOSIS — R26.81 UNSTEADY GAIT WHEN WALKING: Primary | ICD-10-CM

## 2024-03-18 PROCEDURE — 97112 NEUROMUSCULAR REEDUCATION: CPT | Performed by: PHYSICAL THERAPIST

## 2024-03-18 NOTE — PROGRESS NOTES
Daily Note     Today's date: 3/18/2024  Patient name: Sahara Patel  : 1950  MRN: [id number]  Referring provider: Cathryn Lopez MD  Dx:   Encounter Diagnosis     ICD-10-CM    1. Unsteady gait when walking  R26.81       2. Imbalance  R26.89           Start Time: 1146  Stop Time: 1228  Total time in clinic (min): 42 minutes    Subjective: Sahara reports experiencing feelings of stiffness when transitioning from sit to stand and intermittently feeling off balance upon initial standing.      Objective: See treatment diary below      Assessment: Tolerated treatment well. Sahara demonstrated difficulty initiating STS with feet placed on compliant surface and experienced intermittent LOB posteriorly. Pt continues to demonstrate intermittent shuffling of bilateral feet with retro walking, walking with head turns, and walking on compliant surfaces. Patient demonstrated fatigue post treatment, exhibited good technique with therapeutic exercises, and would benefit from continued PT      Plan: Continue per plan of care.  Progress treatment as tolerated.       Precautions: Imbalance/Falls Risk    Access Code: [email or web address]  URL: [email or web address]  Date: 2024  Prepared by: Devon Robles    Exercises  - Standing Hip Flexion  - 1 x daily - 7 x weekly - 2 sets - 10 reps  - Side Stepping with Counter Support  - 1 x daily - 7 x weekly - 1 sets - 10 reps  - Sit to Stand with Hands on Knees  - 1 x daily - 7 x weekly - 2 sets - 10 reps  - Seated Hip Internal/External Rotation  - 1 x daily - 7 x weekly - 2 sets - 10 reps - 3 hold  - Standing Tandem Balance with Counter Support  - 1 x daily - 7 x weekly - 3 sets - 10 reps  - Tandem Walking with Counter Support  - 1 x daily - 7 x weekly - 3 sets - 10 reps  - Backward Tandem Walking with Counter Support  - 1 x daily - 7 x weekly - 3 sets - 10 reps  - Walking with Head Rotation  - 1 x daily - 7 x weekly - 3 sets - 10 reps     Insurance:  AMA/CMS Eval/ Re-eval  Auth #/ Referral # Total units  Start date  Expiration date Extension  Visit limitation?  PT only or  PT+OT? Co-Insurance   CMS 1/22/24 NA       100% after deduct    2/21/24 NA            POC Start Date POC Expiration Date Signed POC?   1/22/24 3/18/24 Pend   2/21/2024 4/17/2024 Pending       Date               Visits/Units:  Used               Authed:  Remaining                     Manuals 3/18 3/14 3/11 3/4 2/28    14 13 12 11 10                           Neuro Re-Ed        STS 2x10   2x10 w/ feet on airex  2x10      SLS hip flexion Multidirectional cone taps on airex 1UE support 2x10 ea 3-way cone tap  2x10 B Multidirectional cone taps on airex 1UE support     Side step        1/2 romberg w/EC        Tandem stance    2x1min with ball toss  3x30s ea with EC   Romberg on airex   Horizontal + vertical head turns 20x ea 3x1 min while playing catch with PT 3x1 min while playing catch with PT    Side stepping on airex beam  [id number] w/ intermittent 1 UE support on table      Tandem walking On airex beam 1UE support 10x  On airex at table fwd/retro 10x     Retro walking   Fwd/retro over 2 hurdles at table 20x      Fwd/retro over 3 hurdles at railing 20x   Hurdles Fwd/lateral over 2 hurdles on airex beam at railing [id number]   Lateral over 2 hurdles on airex beam at table [id number] Lateral over 2 hurdles on airex beam at table [id number]    Fwd over 2 hurdles on airex beam at railing 1x10 Lateral over 2 hurdles on airex beam at railing 2x10    Fwd over 2 hurdlges on airex beam at railing 1x10   Walking with VOR Horizontal 4x30ft, vertical 4x30ft Horizontal  -5x60ft +  Vertical 1x60ft      Standing on bosu      At railing flat side up 1min with UE support    Mini lunges  2x10 B on bosu w/ 1UE support  On bosu 2x10 each On bosu 1x10 each   Wobble board    A-P 20x 1UE support   M-L 10x 1UE support     Weaving  4 cones fwd/retro 5x ea  4 cones fwd/retro 4x each     Ther Ex        Education        Seated hip IR/ER        HEP         DGI        5xSTS        mCTSIB        6MWT        2MWT                            Patient treated by [facility] Bee under my direct supervision.

## 2024-03-21 ENCOUNTER — EVALUATION (OUTPATIENT)
Dept: PHYSICAL THERAPY | Facility: CLINIC | Age: 74
End: 2024-03-21
Payer: MEDICARE

## 2024-03-21 DIAGNOSIS — R26.89 IMBALANCE: ICD-10-CM

## 2024-03-21 DIAGNOSIS — R26.81 UNSTEADY GAIT WHEN WALKING: Primary | ICD-10-CM

## 2024-03-21 PROCEDURE — 97110 THERAPEUTIC EXERCISES: CPT | Performed by: PHYSICAL THERAPIST

## 2024-03-21 NOTE — LETTER
2024    Cathryn Lopez MD  [facility]    Patient: Sahara Patel   YOB: 1950   Date of Visit: 3/21/2024     Encounter Diagnosis     ICD-10-CM    1. Unsteady gait when walking  R26.81       2. Imbalance  R26.89           Dear Dr. Lopez:    Thank you for your recent referral of Sahara Patel. Please review the attached evaluation summary from Sahara's recent visit.     Please verify that you agree with the plan of care by signing the attached order.     If you have any questions or concerns, please do not hesitate to call.     I sincerely appreciate the opportunity to share in the care of one of your patients and hope to have another opportunity to work with you in the near future.       Sincerely,    Devon Robles, PT      Referring Provider:      I certify that I have read the below Plan of Care and certify the need for these services furnished under this plan of treatment while under my care.                    Cathryn Lopez MD  [facility]  Via Fax: [phone]          PT Re-Evaluation     Today's date: 3/21/2024  Patient name: Sahara Patel  : 1950  MRN: [id number]  Referring provider: Cathryn Lopez MD  Dx:   Encounter Diagnosis     ICD-10-CM    1. Unsteady gait when walking  R26.81       2. Imbalance  R26.89           Start Time: 1018  Stop Time: 1100  Total time in clinic (min): 42 minutes    Assessment  Current assessment details: At progress note, Sahara Patel demonstrates improvement of 5xSTS and LE strength upon MMT assessment. Pt demonstrated minimal improvement with 6MWT, 2MWT, and DGI and continues to score outside of age-related normal values suggestive of impaired functional mobility and increased fall risk. Pt continues to demonstrate signs of impaired balance with increased postural sway on compliant surfaces due to impaired proprioception bilaterally, altered gait speed, difficulty  pivoting/turning, disruption of smooth gait path with H+V head turns, and changes in gait velocity and step length when negotiating obstacles. Pt continues to demonstrate decreased foot clearance, intermittent shuffling, and narrow base of support, contributing to increased risk of falls. Sahara Patel would continue to benefit from skilled physical therapy services to further challenge all balance systems to improve safety in her home and in the community and promote return to PLOF, confidence, and activity in order to improve overall QOL.        Impairments: abnormal gait, abnormal or restricted ROM, abnormal movement, activity intolerance, impaired balance, impaired physical strength, lacks appropriate home exercise program, poor posture  and poor body mechanics  Understanding of Dx/Px/POC: good   Prognosis: good    Goals  STG 2-4 weeks:  1. Improve 5xSTS score by >4 seconds indicating improve ability to transfer STS. met  2. Pt will demonstrate increase DGI score by 1-2 points as evidence of decreased imbalance. met  3. Pt will be I with basic HEP. met    LTG 4-8 weeks:  1. Pt will improve 6 minute walk test by > 250ft as evidence of improved functional mobility not met   2. Pt will icrease her ABC scale score to >75% not met  3. Pt will be I with comprehensive HEP. In progress   4. Improve hip strength by 1/2 grade in all deficient planes. Partially met      Plan  Plan details: HEP development, stretching, strengthening, A/AA/PROM, joint mobilizations, posture education, STM/MI as needed to reduce muscle tension, balance and proprioceptive training, muscle reeducation, PLOC discussed and agreed upon with patient.    Patient would benefit from: PT eval and skilled physical therapy  Planned modality interventions: cryotherapy and thermotherapy: hydrocollator packs  Planned therapy interventions: manual therapy, neuromuscular re-education, self care, therapeutic activities, therapeutic exercise and home  "exercise program  Frequency: 2x week  Duration in weeks: 8  Treatment plan discussed with: patient      Subjective Evaluation    History of Present Illness  Current Subjective: Sahara reports continuing to feel unsteady upon initial standing however is noticing improvement with walking. Pt states she feels fairly steady going up and down stairs as long as she holds onto a railing. Pt states she feels like over the last two weeks she has been much less active due to her hand surgery and feels like she went a little \"backwards\" in her progress due to inability to do as much. Pt states she is worried about starting to walk outside on uneven sidewalks as she thinks she will lose her balance. However, pt states she feels like she is better overall in regards to steadiness on her feet.         Chief complaints:  Difficulty walking on uneven surface, transfer sit to stand after prolonged inactivity, initial standing after transitioning from sitting    Patient Goals  Patient goals for therapy: improved balance, increased strength and independence with ADLs/IADLs    Social Support  Steps to enter house: yes  4  Stairs in house: yes   4  Lives in: multiple-level home  Lives with: significant other    Employment status: not working  Exercise history: Pilates - 1-2x/wk        Objective     Postural Observations  Seated posture: fair  Standing posture: fair      Neurological Testing     Sensation     Lumbar   Left   Diminished: light touch    Right   Diminished: light touch    Additional Neurological Details  Evidence of 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     Strength/Myotome Testing     Left Hip   Planes of Motion   Flexion: 4+   External rotation: 4  Internal rotation: 4    Right Hip   Planes of Motion   Flexion: 4+  External rotation: 4  Internal rotation: 4    Left Knee   Flexion: 5  Extension: 5    Right Knee   Flexion: 5  Extension: 5    Left Ankle/Foot   Dorsiflexion: 5  Plantar flexion: 5    Right Ankle/Foot "   Dorsiflexion: 5  Plantar flexion: 5    Ambulation     Ambulation: Stairs   Ascend stairs: independent  Pattern: reciprocal  Railings: one rail  Descend stairs: independent  Pattern: reciprocal  Railings: one rail    Additional Stairs Ambulation Details  Requires use of railing to carry objects up and down the stairs     Observational Gait     Additional Observational Gait Details  Intermittent lateral sway, shuffling of feet L>R due to reduced foot clearance  Increased external rotation in L LE through gait cycle   Shortened step length         DGI  MDC: vestibular - 4 pts  MDC: geriatric/community - 3 pts  Falls risk <19/24 5xSTS: Lucila et al 2010  MDC: 2.3 sec  Age Norms:  60-69: 11.1 sec  70-79: 12.6 sec  80-89: 14.8 sec   6 Minute Walk Test  Age Norms  60-69: M - 1876 ft (571.80 m)  F - 1765 ft (537.98 m)  70-79: M - 1729 ft (527.00 m)  F - 1545 ft (470.92 m)  80-89: M - 1368 ft (416.97 m)  F - 1286 ft (391.97 m) mCTSIB  Norm: 20-60 yrs  Eyes open firm: norm sway 0.21-0.48  Eyes closed firm: norm sway 0.48-0.99  Eyes open foam: norm sway 0.38-0.71  Eyes closed foam: norm sway 0.70-2.22       Outcome Measures Initial Eval   2/21/2024  3/21/2024        5xSTS 22.06s 17.00 1LOB 15.44s         mCTSIB  - FTEO (firm)  - FTEC (firm)  - FTEO (foam)  - FTEC (foam) 0.94  1.28  1.38  2.75 w/ 1 grab of HR  Composite:1.58 0.61  1.27  1.50  2.77 w/ 1 grab of HR    Composite: 1.53 0.91  1.23  1.42  3.06 w/ 1 grab of HR      Composite:  1.65        6MWT 1100ft 1275ft 1250ft 1LOB while turning         2MWT 375ft 440ft 400ft        DGI 19/24 21/24 21/24       ABC  70.6%  57.5%                      Precautions: Imbalance/Falls Risk    Access Code: [email or web address]  URL: [email or web address]  Date: 02/07/2024  Prepared by: Devon Robles    Exercises  - Standing Hip Flexion  - 1 x daily - 7 x weekly - 2 sets - 10 reps  - Side Stepping with Counter Support  - 1 x daily - 7 x weekly - 1 sets - 10 reps  - Sit to Stand with Hands  on Knees  - 1 x daily - 7 x weekly - 2 sets - 10 reps  - Seated Hip Internal/External Rotation  - 1 x daily - 7 x weekly - 2 sets - 10 reps - 3 hold  - Standing Tandem Balance with Counter Support  - 1 x daily - 7 x weekly - 3 sets - 10 reps  - Tandem Walking with Counter Support  - 1 x daily - 7 x weekly - 3 sets - 10 reps  - Backward Tandem Walking with Counter Support  - 1 x daily - 7 x weekly - 3 sets - 10 reps  - Walking with Head Rotation  - 1 x daily - 7 x weekly - 3 sets - 10 reps     Insurance:  AMA/CMS Eval/ Re-eval Auth #/ Referral # Total units  Start date  Expiration date Extension  Visit limitation?  PT only or  PT+OT? Co-Insurance   CMS 1/22/24 NA       100% after deduct    2/21/24 NA            POC Start Date POC Expiration Date Signed POC?   1/22/24 3/18/24 Pend   2/21/2024 4/17/2024 Pending    3/21/2024 5/16/2024 pending      Date               Visits/Units:  Used               Authed:  Remaining                     Manuals 3/21 3/18 3/14 3/11 3/4    15 re-eval  14 13 12 11                           Neuro Re-Ed        STS  2x10   2x10 w/ feet on airex  2x10     SLS hip flexion  Multidirectional cone taps on airex 1UE support 2x10 ea 3-way cone tap  2x10 B Multidirectional cone taps on airex 1UE support    Side step        1/2 romberg w/EC        Tandem stance     2x1min with ball toss    Romberg on airex    Horizontal + vertical head turns 20x ea 3x1 min while playing catch with PT   Side stepping on airex beam   [id number] w/ intermittent 1 UE support on table     Tandem walking  On airex beam 1UE support 10x  On airex at table fwd/retro 10x    Retro walking    Fwd/retro over 2 hurdles at table 20x        Hurdles  Fwd/lateral over 2 hurdles on airex beam at railing [id number]   Lateral over 2 hurdles on airex beam at table [id number] Lateral over 2 hurdles on airex beam at table [id number]    Fwd over 2 hurdles on airex beam at railing 1x10   Walking with VOR  Horizontal 4x30ft, vertical 4x30ft Horizontal   -5x60ft +  Vertical 1x60ft     Standing on bosu         Mini lunges   2x10 B on bosu w/ 1UE support  On bosu 2x10 each   Wobble board     A-P 20x 1UE support   M-L 10x 1UE support    Weaving   4 cones fwd/retro 5x ea  4 cones fwd/retro 4x each    Ther Ex        Education        Seated hip IR/ER        HEP        DGI Performed        5xSTS Performed        mCTSIB Performed        6MWT Performed        2MWT Performed                            Patient treated by YOLI Bee under my direct supervision.           Attestation signed by Devon Robles PT at 3/21/2024  5:59 PM:  I supervised the visit.  We discussed the case to ensure appropriate continuation and progression of care and I reviewed the documentation.

## 2024-03-21 NOTE — PROGRESS NOTES
PT Re-Evaluation     Today's date: 3/21/2024  Patient name: Sahara Patel  : 1950  MRN: [id number]  Referring provider: Cathryn Lopez MD  Dx:   Encounter Diagnosis     ICD-10-CM    1. Unsteady gait when walking  R26.81       2. Imbalance  R26.89           Start Time: 1018  Stop Time: 1100  Total time in clinic (min): 42 minutes    Assessment  Current assessment details: At progress note, Sahara Patel demonstrates improvement of 5xSTS and LE strength upon MMT assessment. Pt demonstrated minimal improvement with 6MWT, 2MWT, and DGI and continues to score outside of age-related normal values suggestive of impaired functional mobility and increased fall risk. Pt continues to demonstrate signs of impaired balance with increased postural sway on compliant surfaces due to impaired proprioception bilaterally, altered gait speed, difficulty pivoting/turning, disruption of smooth gait path with H+V head turns, and changes in gait velocity and step length when negotiating obstacles. Pt continues to demonstrate decreased foot clearance, intermittent shuffling, and narrow base of support, contributing to increased risk of falls. Sahara Patel would continue to benefit from skilled physical therapy services to further challenge all balance systems to improve safety in her home and in the community and promote return to PLOF, confidence, and activity in order to improve overall QOL.        Impairments: abnormal gait, abnormal or restricted ROM, abnormal movement, activity intolerance, impaired balance, impaired physical strength, lacks appropriate home exercise program, poor posture  and poor body mechanics  Understanding of Dx/Px/POC: good   Prognosis: good    Goals  STG 2-4 weeks:  1. Improve 5xSTS score by >4 seconds indicating improve ability to transfer STS. met  2. Pt will demonstrate increase DGI score by 1-2 points as evidence of decreased imbalance. met  3. Pt will be I with basic HEP. met    LTG 4-8  "weeks:  1. Pt will improve 6 minute walk test by > 250ft as evidence of improved functional mobility not met   2. Pt will icrease her ABC scale score to >75% not met  3. Pt will be I with comprehensive HEP. In progress   4. Improve hip strength by 1/2 grade in all deficient planes. Partially met      Plan  Plan details: HEP development, stretching, strengthening, A/AA/PROM, joint mobilizations, posture education, STM/MI as needed to reduce muscle tension, balance and proprioceptive training, muscle reeducation, PLOC discussed and agreed upon with patient.    Patient would benefit from: PT eval and skilled physical therapy  Planned modality interventions: cryotherapy and thermotherapy: hydrocollator packs  Planned therapy interventions: manual therapy, neuromuscular re-education, self care, therapeutic activities, therapeutic exercise and home exercise program  Frequency: 2x week  Duration in weeks: 8  Treatment plan discussed with: patient      Subjective Evaluation    History of Present Illness  Current Subjective: Sahara reports continuing to feel unsteady upon initial standing however is noticing improvement with walking. Pt states she feels fairly steady going up and down stairs as long as she holds onto a railing. Pt states she feels like over the last two weeks she has been much less active due to her hand surgery and feels like she went a little \"backwards\" in her progress due to inability to do as much. Pt states she is worried about starting to walk outside on uneven sidewalks as she thinks she will lose her balance. However, pt states she feels like she is better overall in regards to steadiness on her feet.         Chief complaints:  Difficulty walking on uneven surface, transfer sit to stand after prolonged inactivity, initial standing after transitioning from sitting    Patient Goals  Patient goals for therapy: improved balance, increased strength and independence with ADLs/IADLs    Social Support  Steps " to enter house: yes  4  Stairs in house: yes   4  Lives in: multiple-level home  Lives with: significant other    Employment status: not working  Exercise history: Pilates - 1-2x/wk        Objective     Postural Observations  Seated posture: fair  Standing posture: fair      Neurological Testing     Sensation     Lumbar   Left   Diminished: light touch    Right   Diminished: light touch    Additional Neurological Details  Evidence of 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     Strength/Myotome Testing     Left Hip   Planes of Motion   Flexion: 4+   External rotation: 4  Internal rotation: 4    Right Hip   Planes of Motion   Flexion: 4+  External rotation: 4  Internal rotation: 4    Left Knee   Flexion: 5  Extension: 5    Right Knee   Flexion: 5  Extension: 5    Left Ankle/Foot   Dorsiflexion: 5  Plantar flexion: 5    Right Ankle/Foot   Dorsiflexion: 5  Plantar flexion: 5    Ambulation     Ambulation: Stairs   Ascend stairs: independent  Pattern: reciprocal  Railings: one rail  Descend stairs: independent  Pattern: reciprocal  Railings: one rail    Additional Stairs Ambulation Details  Requires use of railing to carry objects up and down the stairs     Observational Gait     Additional Observational Gait Details  Intermittent lateral sway, shuffling of feet L>R due to reduced foot clearance  Increased external rotation in L LE through gait cycle   Shortened step length         DGI  MDC: vestibular - 4 pts  MDC: geriatric/community - 3 pts  Falls risk <19/24 5xSTS: Lucila et al 2010  MDC: 2.3 sec  Age Norms:  60-69: 11.1 sec  70-79: 12.6 sec  80-89: 14.8 sec   6 Minute Walk Test  Age Norms  60-69: M - 1876 ft (571.80 m)  F - 1765 ft (537.98 m)  70-79: M - 1729 ft (527.00 m)  F - 1545 ft (470.92 m)  80-89: M - 1368 ft (416.97 m)  F - 1286 ft (391.97 m) mCTSIB  Norm: 20-60 yrs  Eyes open firm: norm sway 0.21-0.48  Eyes closed firm: norm sway 0.48-0.99  Eyes open foam: norm sway 0.38-0.71  Eyes closed foam: norm sway  0.70-2.22       Outcome Measures Initial Eval   2/21/2024  3/21/2024        5xSTS 22.06s 17.00 1LOB 15.44s         mCTSIB  - FTEO (firm)  - FTEC (firm)  - FTEO (foam)  - FTEC (foam) 0.94  1.28  1.38  2.75 w/ 1 grab of HR  Composite:1.58 0.61  1.27  1.50  2.77 w/ 1 grab of HR    Composite: 1.53 0.91  1.23  1.42  3.06 w/ 1 grab of HR      Composite:  1.65        6MWT 1100ft 1275ft 1250ft 1LOB while turning         2MWT 375ft 440ft 400ft        DGI 19/24 21/24 21/24       ABC  70.6%  57.5%                      Precautions: Imbalance/Falls Risk    Access Code: [email or web address]  URL: [email or web address]  Date: 02/07/2024  Prepared by: Devon Robles    Exercises  - Standing Hip Flexion  - 1 x daily - 7 x weekly - 2 sets - 10 reps  - Side Stepping with Counter Support  - 1 x daily - 7 x weekly - 1 sets - 10 reps  - Sit to Stand with Hands on Knees  - 1 x daily - 7 x weekly - 2 sets - 10 reps  - Seated Hip Internal/External Rotation  - 1 x daily - 7 x weekly - 2 sets - 10 reps - 3 hold  - Standing Tandem Balance with Counter Support  - 1 x daily - 7 x weekly - 3 sets - 10 reps  - Tandem Walking with Counter Support  - 1 x daily - 7 x weekly - 3 sets - 10 reps  - Backward Tandem Walking with Counter Support  - 1 x daily - 7 x weekly - 3 sets - 10 reps  - Walking with Head Rotation  - 1 x daily - 7 x weekly - 3 sets - 10 reps     Insurance:  AMA/CMS Eval/ Re-eval Auth #/ Referral # Total units  Start date  Expiration date Extension  Visit limitation?  PT only or  PT+OT? Co-Insurance   CMS 1/22/24 NA       100% after deduct    2/21/24 NA            POC Start Date POC Expiration Date Signed POC?   1/22/24 3/18/24 Pend   2/21/2024 4/17/2024 Pending    3/21/2024 5/16/2024 pending      Date               Visits/Units:  Used               Authed:  Remaining                     Manuals 3/21 3/18 3/14 3/11 3/4    15 re-eval  14 13 12 11                           Neuro Re-Ed        STS  2x10   2x10 w/ feet on airex  2x10      SLS hip flexion  Multidirectional cone taps on airex 1UE support 2x10 ea 3-way cone tap  2x10 B Multidirectional cone taps on airex 1UE support    Side step        1/2 romberg w/EC        Tandem stance     2x1min with ball toss    Romberg on airex    Horizontal + vertical head turns 20x ea 3x1 min while playing catch with PT   Side stepping on airex beam   [id number] w/ intermittent 1 UE support on table     Tandem walking  On airex beam 1UE support 10x  On airex at table fwd/retro 10x    Retro walking    Fwd/retro over 2 hurdles at table 20x        Hurdles  Fwd/lateral over 2 hurdles on airex beam at railing [id number]   Lateral over 2 hurdles on airex beam at table [id number] Lateral over 2 hurdles on airex beam at table [id number]    Fwd over 2 hurdles on airex beam at railing 1x10   Walking with VOR  Horizontal 4x30ft, vertical 4x30ft Horizontal  -5x60ft +  Vertical 1x60ft     Standing on bosu         Mini lunges   2x10 B on bosu w/ 1UE support  On bosu 2x10 each   Wobble board     A-P 20x 1UE support   M-L 10x 1UE support    Weaving   4 cones fwd/retro 5x ea  4 cones fwd/retro 4x each    Ther Ex        Education        Seated hip IR/ER        HEP        DGI Performed        5xSTS Performed        mCTSIB Performed        6MWT Performed        2MWT Performed                            Patient treated by YOLI Bee under my direct supervision.

## 2024-03-25 ENCOUNTER — OFFICE VISIT (OUTPATIENT)
Dept: PHYSICAL THERAPY | Facility: CLINIC | Age: 74
End: 2024-03-25
Payer: MEDICARE

## 2024-03-25 DIAGNOSIS — R26.81 UNSTEADY GAIT WHEN WALKING: Primary | ICD-10-CM

## 2024-03-25 DIAGNOSIS — R26.89 IMBALANCE: ICD-10-CM

## 2024-03-25 PROCEDURE — 97112 NEUROMUSCULAR REEDUCATION: CPT | Performed by: PHYSICAL THERAPIST

## 2024-03-25 NOTE — PROGRESS NOTES
Daily Note     Today's date: 3/25/2024  Patient name: Sahara Patel  : 1950  MRN: [id number]  Referring provider: Cathryn Lopez MD  Dx:   Encounter Diagnosis     ICD-10-CM    1. Unsteady gait when walking  R26.81       2. Imbalance  R26.89           Start Time: 1105  Stop Time: 1145  Total time in clinic (min): 40 minutes    Subjective: Sahara reports she was able to walk a mile yesterday however noticed she was shuffling her feet a lot by the end of the walk. Pt states she felt unsteady when stopping/starting and with turns while walking.      Objective: See treatment diary below      Assessment: Tolerated treatment well. Pt demonstrated difficulty with reactive balance exercises and experienced frequent LOB with resisted retro walking, requiring intermittent contact guard/hand held assist. Pt continues to demonstrate increased postural sway and balance deficit upon initial standing on compliant surface. Patient demonstrated fatigue post treatment and would benefit from continued PT      Plan: Continue per plan of care.  Progress treatment as tolerated.       Precautions: Imbalance/Falls Risk    Access Code: [email or web address]  URL: [email or web address]  Date: 2024  Prepared by: Devon Robles    Exercises  - Standing Hip Flexion  - 1 x daily - 7 x weekly - 2 sets - 10 reps  - Side Stepping with Counter Support  - 1 x daily - 7 x weekly - 1 sets - 10 reps  - Sit to Stand with Hands on Knees  - 1 x daily - 7 x weekly - 2 sets - 10 reps  - Seated Hip Internal/External Rotation  - 1 x daily - 7 x weekly - 2 sets - 10 reps - 3 hold  - Standing Tandem Balance with Counter Support  - 1 x daily - 7 x weekly - 3 sets - 10 reps  - Tandem Walking with Counter Support  - 1 x daily - 7 x weekly - 3 sets - 10 reps  - Backward Tandem Walking with Counter Support  - 1 x daily - 7 x weekly - 3 sets - 10 reps  - Walking with Head Rotation  - 1 x daily - 7 x weekly - 3 sets - 10 reps     Insurance:  AMA/CMS  Eval/ Re-eval Auth #/ Referral # Total units  Start date  Expiration date Extension  Visit limitation?  PT only or  PT+OT? Co-Insurance   CMS 1/22/24 NA       100% after deduct    2/21/24 NA            POC Start Date POC Expiration Date Signed POC?   1/22/24 3/18/24 Pend   2/21/2024 4/17/2024 Pending    3/21/2024 5/16/2024 pending      Date               Visits/Units:  Used               Authed:  Remaining                     Manuals 3/25 3/21 3/18 3/14 3/11    16 15 re-eval  14 13 12                           Neuro Re-Ed        STS 2x10 w/ feet on airex   2x10   2x10 w/ feet on airex  2x10    SLS hip flexion   Multidirectional cone taps on airex 1UE support 2x10 ea 3-way cone tap  2x10 B Multidirectional cone taps on airex 1UE support   Side step        1/2 romberg w/EC        Tandem stance        Romberg on airex     Horizontal + vertical head turns 20x ea   Side stepping on airex beam    [id number] w/ intermittent 1 UE support on table    Tandem walking On airex beam 1UE support 10x fwd/retro   On airex beam 1UE support 10x  On airex at table fwd/retro 10x   Retro walking     Fwd/retro over 2 hurdles at table 20x       Hurdles   Fwd/lateral over 2 hurdles on airex beam at railing [id number]   Lateral over 2 hurdles on airex beam at table [id number]   Walking with VOR   Horizontal 4x30ft, vertical 4x30ft Horizontal  -5x60ft +  Vertical 1x60ft    Standing on bosu         Mini lunges    2x10 B on bosu w/ 1UE support    Resisted walking through hoops Fwd/retro/lateral 5x ea       Wobble board      A-P 20x 1UE support   M-L 10x 1UE support   Walking w/ stop+pivot 10x       Weaving    4 cones fwd/retro 5x ea  4 cones fwd/retro 4x each   Ther Ex        Education        Seated hip IR/ER        HEP        DGI  Performed       5xSTS  Performed       mCTSIB  Performed       6MWT  Performed       2MWT  Performed                           Patient treated by YOLI Bee under my direct supervision.

## 2024-03-27 ENCOUNTER — OFFICE VISIT (OUTPATIENT)
Dept: PHYSICAL THERAPY | Facility: CLINIC | Age: 74
End: 2024-03-27
Payer: MEDICARE

## 2024-03-27 DIAGNOSIS — R26.81 UNSTEADY GAIT WHEN WALKING: ICD-10-CM

## 2024-03-27 DIAGNOSIS — R26.89 IMBALANCE: Primary | ICD-10-CM

## 2024-03-27 PROCEDURE — 97112 NEUROMUSCULAR REEDUCATION: CPT | Performed by: PHYSICAL THERAPIST

## 2024-03-27 NOTE — PROGRESS NOTES
Daily Note     Today's date: 3/27/2024  Patient name: Sahara Patel  : 1950  MRN: [id number]  Referring provider: Cathryn Lopez MD  Dx:   Encounter Diagnosis     ICD-10-CM    1. Imbalance  R26.89       2. Unsteady gait when walking  R26.81           Start Time: 1445  Stop Time: 1525  Total time in clinic (min): 40 minutes    Subjective: Sahara reports she was fearful to go up and down stairs today without railings and while carrying objects, however was able to do so successfully.       Objective: See treatment diary below      Assessment: Tolerated treatment well. Sahara continues to work on reactive balance and experienced intermittent loss of balance with resisted retro walking today, however was able to perform activities without hand-held assist. Worked on ascending/descending steps without UE assist and unilateral load with pt demonstrating mild apprehension upon initial descend and intermittent non-reciprocal pattern. Patient demonstrated fatigue post treatment and would benefit from continued PT      Plan: Continue per plan of care.  Progress treatment as tolerated.       Precautions: Imbalance/Falls Risk    Access Code: [email or web address]  URL: [email or web address]  Date: 2024  Prepared by: Devon Robles    Exercises  - Standing Hip Flexion  - 1 x daily - 7 x weekly - 2 sets - 10 reps  - Side Stepping with Counter Support  - 1 x daily - 7 x weekly - 1 sets - 10 reps  - Sit to Stand with Hands on Knees  - 1 x daily - 7 x weekly - 2 sets - 10 reps  - Seated Hip Internal/External Rotation  - 1 x daily - 7 x weekly - 2 sets - 10 reps - 3 hold  - Standing Tandem Balance with Counter Support  - 1 x daily - 7 x weekly - 3 sets - 10 reps  - Tandem Walking with Counter Support  - 1 x daily - 7 x weekly - 3 sets - 10 reps  - Backward Tandem Walking with Counter Support  - 1 x daily - 7 x weekly - 3 sets - 10 reps  - Walking with Head Rotation  - 1 x daily - 7 x weekly - 3 sets - 10 reps      "Insurance:  AMA/CMS Eval/ Re-eval Auth #/ Referral # Total units  Start date  Expiration date Extension  Visit limitation?  PT only or  PT+OT? Co-Insurance   CMS 1/22/24 NA       100% after deduct    2/21/24 NA            POC Start Date POC Expiration Date Signed POC?   1/22/24 3/18/24 Pend   2/21/2024 4/17/2024 Pending    3/21/2024 5/16/2024 pending      Date               Visits/Units:  Used               Authed:  Remaining                     Manuals 3/27 3/25 3/21 3/18 3/14    17 16 15 re-eval  14 13                           Neuro Re-Ed        STS  2x10 w/ feet on airex   2x10   2x10 w/ feet on airex  2x10   SLS hip flexion    Multidirectional cone taps on airex 1UE support 2x10 ea 3-way cone tap  2x10 B   Side step        1/2 romberg w/EC        Tandem stance        Romberg on airex        Side stepping on airex beam     [id number] w/ intermittent 1 UE support on table   Tandem walking Fwd w/o UE support close supervision 30ftx4 On airex beam 1UE support 10x fwd/retro   On airex beam 1UE support 10x    Retro walking        Hurdles Fwd+retro  w/ [place] 3.0 10x   Fwd/lateral over 2 hurdles on airex beam at railing [id number]     Walking with VOR    Horizontal 4x30ft, vertical 4x30ft Horizontal  -5x60ft +  Vertical 1x60ft   Standing on bosu         Mini lunges     2x10 B on bosu w/ 1UE support   Resisted walking through hoops Fwd/retro 10x Fwd/retro/lateral 5x ea      Wobble board         Walking w/ stop+pivot  10x      Weaving  Fwd/retro/lateral 4 cones 5x ea   4 cones fwd/retro 5x ea    Up & down stairs 2\"-4\"-6\" w/ 5lb weight in R hand 10x       Ther Ex        Education        Seated hip IR/ER        HEP        DGI   Performed      5xSTS   Performed      mCTSIB   Performed      6MWT   Performed      2MWT   Performed                          Patient treated by Lynda Fierro, SPT under my direct supervision.           "

## 2024-04-02 ENCOUNTER — OFFICE VISIT (OUTPATIENT)
Dept: PHYSICAL THERAPY | Facility: CLINIC | Age: 74
End: 2024-04-02
Payer: MEDICARE

## 2024-04-02 DIAGNOSIS — R26.81 UNSTEADY GAIT WHEN WALKING: Primary | ICD-10-CM

## 2024-04-02 DIAGNOSIS — R26.89 IMBALANCE: ICD-10-CM

## 2024-04-02 PROCEDURE — 97112 NEUROMUSCULAR REEDUCATION: CPT | Performed by: PHYSICAL THERAPIST

## 2024-04-02 NOTE — PROGRESS NOTES
Daily Note     Today's date: 2024  Patient name: Sahara Patel  : 1950  MRN: [id number]  Referring provider: Cathryn Lopez MD  Dx:   Encounter Diagnosis     ICD-10-CM    1. Unsteady gait when walking  R26.81       2. Imbalance  R26.89           Start Time: 1018  Stop Time: 1100  Total time in clinic (min): 42 minutes    Subjective: Sahara reports she feels like she continues to move slowly and is very careful with movement due to feeling off balance.      Objective: See treatment diary below      Assessment: Tolerated treatment well. Sahara had difficulty today with maintaining balance on compliant surfaces without UE support and required frequent cueing to reduce posterior lean to prevent LOB. Discussed pt's goals and reviewed pt's perception of her progress; pt would like to make sure she feels steady and safer on her feet however she feels she is making improvement overall.  Patient demonstrated fatigue post treatment and would benefit from continued PT      Plan: Continue per plan of care.  Progress treatment as tolerated.       Precautions: Imbalance/Falls Risk    Access Code: [email or web address]  URL: [email or web address]  Date: 2024  Prepared by: Devon Robles    Exercises  - Standing Hip Flexion  - 1 x daily - 7 x weekly - 2 sets - 10 reps  - Side Stepping with Counter Support  - 1 x daily - 7 x weekly - 1 sets - 10 reps  - Sit to Stand with Hands on Knees  - 1 x daily - 7 x weekly - 2 sets - 10 reps  - Seated Hip Internal/External Rotation  - 1 x daily - 7 x weekly - 2 sets - 10 reps - 3 hold  - Standing Tandem Balance with Counter Support  - 1 x daily - 7 x weekly - 3 sets - 10 reps  - Tandem Walking with Counter Support  - 1 x daily - 7 x weekly - 3 sets - 10 reps  - Backward Tandem Walking with Counter Support  - 1 x daily - 7 x weekly - 3 sets - 10 reps  - Walking with Head Rotation  - 1 x daily - 7 x weekly - 3 sets - 10 reps     Insurance:  AMA/CMS Eval/ Re-eval Auth #/  "Referral # Total units  Start date  Expiration date Extension  Visit limitation?  PT only or  PT+OT? Co-Insurance   CMS 1/22/24 NA       100% after deduct    2/21/24 NA            POC Start Date POC Expiration Date Signed POC?   1/22/24 3/18/24 Pend   2/21/2024 4/17/2024 Pending    3/21/2024 5/16/2024 pending      Date               Visits/Units:  Used               Authed:  Remaining                     Manuals 4/2 3/27 3/25 3/21 3/18    18 17 16 15 re-eval  14                           Neuro Re-Ed        STS 2x10 w/ feet on airex   2x10 w/ feet on airex   2x10   2x10 w/ feet on airex    SLS hip flexion     Multidirectional cone taps on airex 1UE support 2x10 ea   Side step        1/2 romberg w/EC        Tandem stance Staggered on pods 20s x 6 alternating front feet w/ CGA       Romberg on airex        Side stepping on airex beam 5x10ft no UE support        Tandem walking Fwd/retro on airex beam 10ft x10  Fwd w/o UE support close supervision 30ftx4 On airex beam 1UE support 10x fwd/retro   On airex beam 1UE support 10x   Retro walking        Hurdles Lateral over 2 hurdles on airex beam 1UE support 5x10ft Fwd+retro  w/ [place] 3.0 10x   Fwd/lateral over 2 hurdles on airex beam at railing [id number]    Walking with VOR     Horizontal 4x30ft, vertical 4x30ft   Standing on bosu         Mini lunges        Resisted walking through hoops Fwd/retro out and in 10x  Fwd/retro 10x Fwd/retro/lateral 5x ea     Wobble board         Walking w/ stop+pivot   10x     Weaving   Fwd/retro/lateral 4 cones 5x ea   4 cones fwd/retro 5x ea   Up & down stairs  2\"-4\"-6\" w/ 5lb weight in R hand 10x      Ther Ex        Education        Seated hip IR/ER        HEP        DGI    Performed     5xSTS    Performed     mCTSIB    Performed     6MWT    Performed     2MWT    Performed                         Patient treated by YOLI Bee under my direct supervision.             "

## 2024-04-04 ENCOUNTER — OFFICE VISIT (OUTPATIENT)
Dept: PHYSICAL THERAPY | Facility: CLINIC | Age: 74
End: 2024-04-04
Payer: MEDICARE

## 2024-04-04 DIAGNOSIS — R26.81 UNSTEADY GAIT WHEN WALKING: Primary | ICD-10-CM

## 2024-04-04 DIAGNOSIS — R26.89 IMBALANCE: ICD-10-CM

## 2024-04-04 PROCEDURE — 97112 NEUROMUSCULAR REEDUCATION: CPT | Performed by: PHYSICAL THERAPIST

## 2024-04-04 NOTE — PROGRESS NOTES
Daily Note     Today's date: 2024  Patient name: Sahara Patel  : 1950  MRN: [id number]  Referring provider: Cathryn Lopez MD  Dx:   Encounter Diagnosis     ICD-10-CM    1. Unsteady gait when walking  R26.81       2. Imbalance  R26.89           Start Time: 1018  Stop Time: 1100  Total time in clinic (min): 42 minutes    Subjective: Sahara reports feeling a little stiff in both her legs today and feels like she is moving slower than usual.       Objective: See treatment diary below      Assessment: Tolerated treatment well. Sahara tolerated dynamic balance interventions today well with intermittent cueing to slow motions and reduce posterior weight shift. Worked on activity specific bosu exercises today with pt requiring cueing to reduce reliance on railing support and minimize risk of posterior loss of balance. Patient demonstrated fatigue post treatment and would benefit from continued PT      Plan: Continue per plan of care.  Progress treatment as tolerated.       Precautions: Imbalance/Falls Risk    Access Code: [email or web address]  URL: [email or web address]  Date: 2024  Prepared by: Devon Robles    Exercises  - Standing Hip Flexion  - 1 x daily - 7 x weekly - 2 sets - 10 reps  - Side Stepping with Counter Support  - 1 x daily - 7 x weekly - 1 sets - 10 reps  - Sit to Stand with Hands on Knees  - 1 x daily - 7 x weekly - 2 sets - 10 reps  - Seated Hip Internal/External Rotation  - 1 x daily - 7 x weekly - 2 sets - 10 reps - 3 hold  - Standing Tandem Balance with Counter Support  - 1 x daily - 7 x weekly - 3 sets - 10 reps  - Tandem Walking with Counter Support  - 1 x daily - 7 x weekly - 3 sets - 10 reps  - Backward Tandem Walking with Counter Support  - 1 x daily - 7 x weekly - 3 sets - 10 reps  - Walking with Head Rotation  - 1 x daily - 7 x weekly - 3 sets - 10 reps     Insurance:  AMA/CMS Eval/ Re-eval Auth #/ Referral # Total units  Start date  Expiration date Extension  Visit  "limitation?  PT only or  PT+OT? Co-Insurance   CMS 1/22/24 NA       100% after deduct    2/21/24 NA            POC Start Date POC Expiration Date Signed POC?   1/22/24 3/18/24 Pend   2/21/2024 4/17/2024 Pending    3/21/2024 5/16/2024 yes      Date               Visits/Units:  Used               Authed:  Remaining                     Manuals 4/4 4/2 3/27 3/25 3/21    19 18 17 16 15 re-eval                            Neuro Re-Ed        STS  2x10 w/ feet on airex   2x10 w/ feet on airex     SLS hip flexion        Side step        1/2 romberg w/EC        Tandem stance  Staggered on pods 20s x 6 alternating front feet w/ CGA      Romberg on airex        Side stepping on airex beam  5x10ft no UE support       Tandem walking  Fwd/retro on airex beam 10ft x10  Fwd w/o UE support close supervision 30ftx4 On airex beam 1UE support 10x fwd/retro     Retro walking        Hurdles  Lateral over 2 hurdles on airex beam 1UE support 5x10ft Fwd+retro  w/ [place] 3.0 10x     Walking with VOR        Standing on bosu         Mini lunges Reverse and lateral at railing 2x10 ea       Squats on bosu 1x10 at railing with cues to reduce posterior lean        Resisted walking through hoops Fwd/retro w/ hurdles on [place] 3.0 10x Fwd/retro out and in 10x  Fwd/retro 10x Fwd/retro/lateral 5x ea    Wobble board         Walking w/ stop+pivot    10x    Weaving    Fwd/retro/lateral 4 cones 5x ea     Up & down stairs 4\"-8\"-6\" w/o UE support 10x   2\"-4\"-6\" w/ 5lb weight in R hand 10x     Walking on airex beams  W/ [place] resistance 3.0 fwd/retro        Ther Ex        Education        Seated hip IR/ER        HEP        DGI     Performed    5xSTS     Performed    mCTSIB     Performed    6MWT     Performed    2MWT     Performed                        Patient treated by Lynda Fierro, SPT under my direct supervision.               "

## 2024-04-08 ENCOUNTER — OFFICE VISIT (OUTPATIENT)
Dept: PHYSICAL THERAPY | Facility: CLINIC | Age: 74
End: 2024-04-08
Payer: MEDICARE

## 2024-04-08 DIAGNOSIS — R26.81 UNSTEADY GAIT WHEN WALKING: Primary | ICD-10-CM

## 2024-04-08 DIAGNOSIS — R26.89 IMBALANCE: ICD-10-CM

## 2024-04-08 PROCEDURE — 97112 NEUROMUSCULAR REEDUCATION: CPT | Performed by: PHYSICAL THERAPIST

## 2024-04-08 NOTE — PROGRESS NOTES
Daily Note     Today's date: 2024  Patient name: Sahara Patel  : 1950  MRN: [id number]  Referring provider: Cathryn Lopez MD  Dx:   Encounter Diagnosis     ICD-10-CM    1. Unsteady gait when walking  R26.81       2. Imbalance  R26.89           Start Time: 1016  Stop Time: 1100  Total time in clinic (min): 44 minutes    Subjective: Sahara reports she feels like she needs to move slow and is feeling stiff this morning in her movements.      Objective: See treatment diary below      Assessment: Sahara experienced a loss of balance during tandem walking on airex beam resulting in a fall to her left side; pt did not hit her head. SPT was in front of patient during activity, about 2 feet away, and witnessed fall. Pt experienced soreness along left lower leg and left hand following fall, however did not have any difficulty ambulating or weight bearing on leg. Assessed for bruising, swelling, erythema, and tenderness to palpation with no significant findings; no indication of fracture or injury. Educated pt to monitor symptoms over next few days to ensure no significant injury and red flags to monitor for that would indicate need to go to emergency room. Focused remainder of session on gentle mobility and LE strengthening with pt tolerating well.  Patient would benefit from continued PT      Plan: Continue per plan of care.  Progress treatment as tolerated.       Precautions: Imbalance/Falls Risk    Access Code: [email or web address]  URL: [email or web address]  Date: 2024  Prepared by: Devon Robles    Exercises  - Standing Hip Flexion  - 1 x daily - 7 x weekly - 2 sets - 10 reps  - Side Stepping with Counter Support  - 1 x daily - 7 x weekly - 1 sets - 10 reps  - Sit to Stand with Hands on Knees  - 1 x daily - 7 x weekly - 2 sets - 10 reps  - Seated Hip Internal/External Rotation  - 1 x daily - 7 x weekly - 2 sets - 10 reps - 3 hold  - Standing Tandem Balance with Counter Support  - 1 x daily - 7  "x weekly - 3 sets - 10 reps  - Tandem Walking with Counter Support  - 1 x daily - 7 x weekly - 3 sets - 10 reps  - Backward Tandem Walking with Counter Support  - 1 x daily - 7 x weekly - 3 sets - 10 reps  - Walking with Head Rotation  - 1 x daily - 7 x weekly - 3 sets - 10 reps     Insurance:  AMA/CMS Eval/ Re-eval Auth #/ Referral # Total units  Start date  Expiration date Extension  Visit limitation?  PT only or  PT+OT? Co-Insurance   CMS 1/22/24 NA       100% after deduct    2/21/24 NA            POC Start Date POC Expiration Date Signed POC?   1/22/24 3/18/24 Pend   2/21/2024 4/17/2024 Pending    3/21/2024 5/16/2024 yes      Date               Visits/Units:  Used               Authed:  Remaining                     Manuals 4/8 4/4 4/2 3/27 3/25    20 19 18 17 16                           Neuro Re-Ed        STS 2x10 cues to widen YOLI  2x10 w/ feet on airex   2x10 w/ feet on airex    SLS hip flexion        Side step        1/2 romberg w/EC        Tandem stance   Staggered on pods 20s x 6 alternating front feet w/ CGA     Romberg on airex        Side stepping on airex beam   5x10ft no UE support      Tandem walking Fwd on airex beam 10ft x10 no UE support   Fwd/retro on airex beam 10ft x10  Fwd w/o UE support close supervision 30ftx4 On airex beam 1UE support 10x fwd/retro    Retro walking        Hurdles Fwd/ retro 3 hurdles no UE support 10x  Lateral over 2 hurdles on airex beam 1UE support 5x10ft Fwd+retro  w/ [place] 3.0 10x    Walking with VOR        Standing on bosu         Mini lunges  Reverse and lateral at railing 2x10 ea      Squats on bosu  1x10 at railing with cues to reduce posterior lean       Resisted walking through hoops  Fwd/retro w/ hurdles on [place] 3.0 10x Fwd/retro out and in 10x  Fwd/retro 10x Fwd/retro/lateral 5x ea   Wobble board         Walking w/ stop+pivot     10x   Weaving     Fwd/retro/lateral 4 cones 5x ea    Up & down stairs  4\"-8\"-6\" w/o UE support 10x   2\"-4\"-6\" w/ 5lb weight in R " hand 10x    Walking on airex beams   W/ kathryn resistance 3.0 fwd/retro       Bridges  2x10 5s hold        Ther Ex        Education        Seated hip IR/ER        HEP        DGI        5xSTS        mCTSIB        6MWT        2MWT        LTR 20x                    Patient treated by YOLI Bee under my direct supervision.

## 2024-04-11 ENCOUNTER — APPOINTMENT (OUTPATIENT)
Dept: PHYSICAL THERAPY | Facility: CLINIC | Age: 74
End: 2024-04-11
Payer: MEDICARE

## 2024-04-12 ENCOUNTER — OFFICE VISIT (OUTPATIENT)
Dept: PHYSICAL THERAPY | Facility: CLINIC | Age: 74
End: 2024-04-12
Payer: MEDICARE

## 2024-04-12 DIAGNOSIS — R26.81 UNSTEADY GAIT WHEN WALKING: Primary | ICD-10-CM

## 2024-04-12 DIAGNOSIS — R26.89 IMBALANCE: ICD-10-CM

## 2024-04-12 PROCEDURE — 97112 NEUROMUSCULAR REEDUCATION: CPT | Performed by: PHYSICAL THERAPIST

## 2024-04-12 NOTE — PROGRESS NOTES
Daily Note     Today's date: 2024  Patient name: Sahara Patel  : 1950  MRN: [id number]  Referring provider: Cathryn Lopez MD  Dx:   Encounter Diagnosis     ICD-10-CM    1. Unsteady gait when walking  R26.81       2. Imbalance  R26.89           Start Time: 1017  Stop Time: 1100  Total time in clinic (min): 43 minutes    Subjective: Sahara reports feeling soreness in her legs after fall and moving slower over past few days.       Objective: See treatment diary below      Assessment: Tolerated treatment well. Sahara required hand held assist for most dynamic balance activities today however did not exhibit any loss of balance. Worked on some functional LE strengthening exercises with pt tolerating well; no discomfort in areas of soreness from previous session. Patient demonstrated fatigue post treatment and would benefit from continued PT      Plan: Continue per plan of care.  Progress treatment as tolerated.       Precautions: Imbalance/Falls Risk    Access Code: [email or web address]  URL: [email or web address]  Date: 2024  Prepared by: Devon Robles    Exercises  - Standing Hip Flexion  - 1 x daily - 7 x weekly - 2 sets - 10 reps  - Side Stepping with Counter Support  - 1 x daily - 7 x weekly - 1 sets - 10 reps  - Sit to Stand with Hands on Knees  - 1 x daily - 7 x weekly - 2 sets - 10 reps  - Seated Hip Internal/External Rotation  - 1 x daily - 7 x weekly - 2 sets - 10 reps - 3 hold  - Standing Tandem Balance with Counter Support  - 1 x daily - 7 x weekly - 3 sets - 10 reps  - Tandem Walking with Counter Support  - 1 x daily - 7 x weekly - 3 sets - 10 reps  - Backward Tandem Walking with Counter Support  - 1 x daily - 7 x weekly - 3 sets - 10 reps  - Walking with Head Rotation  - 1 x daily - 7 x weekly - 3 sets - 10 reps     Insurance:  AMA/CMS Eval/ Re-eval Auth #/ Referral # Total units  Start date  Expiration date Extension  Visit limitation?  PT only or  PT+OT? Co-Insurance   CMS  "1/22/24 NA       100% after deduct    2/21/24 NA            POC Start Date POC Expiration Date Signed POC?   1/22/24 3/18/24 Pend   2/21/2024 4/17/2024 Pending    3/21/2024 5/16/2024 yes      Date               Visits/Units:  Used               Authed:  Remaining                     Manuals 4/12 4/8 4/4 4/2 3/27 3/25    21 20 19 18 17 16                              Neuro Re-Ed         STS 2x10  2x10 cues to widen YOLI  2x10 w/ feet on airex   2x10 w/ feet on airex    SLS hip flexion         Side step         1/2 romberg w/EC         Tandem stance    Staggered on pods 20s x 6 alternating front feet w/ CGA     Romberg on airex         Side stepping on airex beam    5x10ft no UE support      Tandem walking  Fwd on airex beam 10ft x10 no UE support   Fwd/retro on airex beam 10ft x10  Fwd w/o UE support close supervision 30ftx4 On airex beam 1UE support 10x fwd/retro    Retro walking         Hurdles Fwd+retro 2.0 10x  Fwd/ retro 3 hurdles no UE support 10x  Lateral over 2 hurdles on airex beam 1UE support 5x10ft Fwd+retro  w/ [place] 3.0 10x    Walking with VOR         Standing on bosu          Mini lunges   Reverse and lateral at railing 2x10 ea      Squats on bosu   1x10 at railing with cues to reduce posterior lean       Resisted walking through hoops Fwd/retro kathryn 2.0 10x, in/out 10x   Fwd/retro w/ hurdles on [place] 3.0 10x Fwd/retro out and in 10x  Fwd/retro 10x Fwd/retro/lateral 5x ea   Wobble board          Walking w/ stop+pivot      10x   Weaving  4 cones fwd/retro 10x     Fwd/retro/lateral 4 cones 5x ea    Up & down stairs Step ups 6\" box 1UE support 2x10 ea  4\"-8\"-6\" w/o UE support 10x   2\"-4\"-6\" w/ 5lb weight in R hand 10x    Walking on airex beams    W/ kathryn resistance 3.0 fwd/retro       Vibration plate  3min w/ mini squats         Bridges   2x10 5s hold        Ther Ex         Education         Seated hip IR/ER         HEP         DGI         5xSTS         mCTSIB         6MWT         2MWT         LTR  " 20x                     Patient treated by YOLI Bee under my direct supervision.

## 2024-04-15 ENCOUNTER — OFFICE VISIT (OUTPATIENT)
Dept: PHYSICAL THERAPY | Facility: CLINIC | Age: 74
End: 2024-04-15
Payer: MEDICARE

## 2024-04-15 DIAGNOSIS — R26.81 UNSTEADY GAIT WHEN WALKING: Primary | ICD-10-CM

## 2024-04-15 DIAGNOSIS — R26.89 IMBALANCE: ICD-10-CM

## 2024-04-15 PROCEDURE — 97112 NEUROMUSCULAR REEDUCATION: CPT | Performed by: PHYSICAL THERAPIST

## 2024-04-15 NOTE — PROGRESS NOTES
Daily Note     Today's date: 4/15/2024  Patient name: Sahara Patel  : 1950  MRN: [id number]  Referring provider: Cathryn Lopez MD  Dx:   Encounter Diagnosis     ICD-10-CM    1. Unsteady gait when walking  R26.81       2. Imbalance  R26.89           Start Time: 1018  Stop Time: 1100  Total time in clinic (min): 42 minutes    Subjective: Sahara reports she was able to walk around at her grandchild's sporting event, however felt like she needed to take it slow to maintain her balance.      Objective: See treatment diary below      Assessment: Tolerated treatment well. Pt continues to work on improving dynamic balance on variable surfaces. Pt required UE support today to improve confidence on compliant surfaces, however did not demonstrate any loss of balance or significant swaying during exercises today. Worked on closed chain LE strengthening exercises with pt demonstrating some difficulty with initiation of movements, however showed improved control with repetition. Patient demonstrated fatigue post treatment and would benefit from continued PT      Plan: Continue per plan of care.  Progress treatment as tolerated.       Precautions: Imbalance/Falls Risk    Access Code: [email or web address]  URL: [email or web address]  Date: 2024  Prepared by: Devon Robles    Exercises  - Standing Hip Flexion  - 1 x daily - 7 x weekly - 2 sets - 10 reps  - Side Stepping with Counter Support  - 1 x daily - 7 x weekly - 1 sets - 10 reps  - Sit to Stand with Hands on Knees  - 1 x daily - 7 x weekly - 2 sets - 10 reps  - Seated Hip Internal/External Rotation  - 1 x daily - 7 x weekly - 2 sets - 10 reps - 3 hold  - Standing Tandem Balance with Counter Support  - 1 x daily - 7 x weekly - 3 sets - 10 reps  - Tandem Walking with Counter Support  - 1 x daily - 7 x weekly - 3 sets - 10 reps  - Backward Tandem Walking with Counter Support  - 1 x daily - 7 x weekly - 3 sets - 10 reps  - Walking with Head Rotation  - 1 x  "daily - 7 x weekly - 3 sets - 10 reps     Insurance:  AMA/CMS Eval/ Re-eval Auth #/ Referral # Total units  Start date  Expiration date Extension  Visit limitation?  PT only or  PT+OT? Co-Insurance   CMS 1/22/24 NA       100% after deduct    2/21/24 NA           3/21/24 NA            POC Start Date POC Expiration Date Signed POC?   1/22/24 3/18/24 Pend   2/21/2024 4/17/2024 Pending    3/21/2024 5/16/2024 yes      Date               Visits/Units:  Used               Authed:  Remaining                     Manuals 4/15 4/12 4/8 4/4 4/2    22 21 20 19 18                           Neuro Re-Ed        STS Mini squat 4lb w/ chair behind  2x10  2x10 cues to widen YOLI  2x10 w/ feet on airex    SLS hip flexion        Side step        1/2 romberg w/EC        Tandem stance     Staggered on pods 20s x 6 alternating front feet w/ CGA   Romberg on airex        Side stepping on airex beam 10ft x 10     W/ hurdles 10x     5x10ft no UE support    Tandem walking   Fwd on airex beam 10ft x10 no UE support   Fwd/retro on airex beam 10ft x10    Retro walking        Hurdles Fwd w/ airex between 10x  Fwd+retro 2.0 10x  Fwd/ retro 3 hurdles no UE support 10x  Lateral over 2 hurdles on airex beam 1UE support 5x10ft   Walking with VOR        Standing on bosu         Mini lunges    Reverse and lateral at railing 2x10 ea    Squats on bosu    1x10 at railing with cues to reduce posterior lean     Resisted walking through hoops  Fwd/retro kathryn 2.0 10x, in/out 10x   Fwd/retro w/ hurdles on [place] 3.0 10x Fwd/retro out and in 10x    Wobble board         Walking w/ stop+pivot        Weaving   4 cones fwd/retro 10x       Up & down stairs Step ups 6\" 7lb in RUE, 1UE support 2x10 ea Step ups 6\" box 1UE support 2x10 ea  4\"-8\"-6\" w/o UE support 10x     Walking on airex beams     W/ kathryn resistance 3.0 fwd/retro     Vibration plate   3min w/ mini squats       Bridges    2x10 5s hold      Ther Ex        Education        Seated hip IR/ER        HEP      "   DGI        5xSTS        mCTSIB        6MWT        2MWT        LTR   20x                  Patient treated by YOLI Bee under my direct supervision.

## 2024-04-18 ENCOUNTER — OFFICE VISIT (OUTPATIENT)
Dept: PHYSICAL THERAPY | Facility: CLINIC | Age: 74
End: 2024-04-18
Payer: MEDICARE

## 2024-04-18 DIAGNOSIS — R26.89 IMBALANCE: ICD-10-CM

## 2024-04-18 DIAGNOSIS — R26.81 UNSTEADY GAIT WHEN WALKING: Primary | ICD-10-CM

## 2024-04-18 PROCEDURE — 97112 NEUROMUSCULAR REEDUCATION: CPT | Performed by: PHYSICAL THERAPIST

## 2024-04-18 NOTE — PROGRESS NOTES
"Daily Note     Today's date: 2024  Patient name: Sahara Patel  : 1950  MRN: [id number]  Referring provider: Cathryn Lopez MD  Dx:   Encounter Diagnosis     ICD-10-CM    1. Unsteady gait when walking  R26.81       2. Imbalance  R26.89           Start Time: 1018  Stop Time: 1100  Total time in clinic (min): 42 minutes    Subjective: Sahara reports she felt good after previous session and had her \"best day\" in a long time.       Objective: See treatment diary below      Assessment: Tolerated treatment well. Sahara continues to require UE support to maintain balance during dynamic activities, especially on compliant surfaces. Pt had some difficulty today with ball passing task due to increased demand on all balance systems and required intermittent rest due to mild feelings of disorientation. Discussed beginning to transition to d/c planning with pt. Patient demonstrated fatigue post treatment and would benefit from continued PT      Plan: Continue per plan of care.  Progress treatment as tolerated.       Precautions: Imbalance/Falls Risk    Access Code: [email or web address]  URL: [email or web address]  Date: 2024  Prepared by: Devon Robles    Exercises  - Standing Hip Flexion  - 1 x daily - 7 x weekly - 2 sets - 10 reps  - Side Stepping with Counter Support  - 1 x daily - 7 x weekly - 1 sets - 10 reps  - Sit to Stand with Hands on Knees  - 1 x daily - 7 x weekly - 2 sets - 10 reps  - Seated Hip Internal/External Rotation  - 1 x daily - 7 x weekly - 2 sets - 10 reps - 3 hold  - Standing Tandem Balance with Counter Support  - 1 x daily - 7 x weekly - 3 sets - 10 reps  - Tandem Walking with Counter Support  - 1 x daily - 7 x weekly - 3 sets - 10 reps  - Backward Tandem Walking with Counter Support  - 1 x daily - 7 x weekly - 3 sets - 10 reps  - Walking with Head Rotation  - 1 x daily - 7 x weekly - 3 sets - 10 reps     Insurance:  AMA/CMS Eval/ Re-eval Auth #/ Referral # Total units  Start date " " Expiration date Extension  Visit limitation?  PT only or  PT+OT? Co-Insurance   CMS 1/22/24 NA       100% after deduct    2/21/24 NA           3/21/24 NA            POC Start Date POC Expiration Date Signed POC?   1/22/24 3/18/24 Pend   2/21/2024 4/17/2024 Pending    3/21/2024 5/16/2024 yes      Date               Visits/Units:  Used               Authed:  Remaining                     Manuals 4/18 4/15 4/12 4/8 4/4 4/2    23 22 21 20 19 18                              Neuro Re-Ed         STS Mini squat 5lb w/ chair behind  Mini squat 4lb w/ chair behind  2x10  2x10 cues to widen YOLI  2x10 w/ feet on airex    SLS hip flexion         Side step         1/2 romberg w/EC         Tandem stance      Staggered on pods 20s x 6 alternating front feet w/ CGA   Romberg on airex W/ ball pass behind 20x ea way         Side stepping on airex beam 10ft x10    W/ hurdles 10x at table  10ft x 10     W/ hurdles 10x     5x10ft no UE support    Tandem walking Fwd on airex beam 10ft x10 1UE support at table    Fwd on airex beam 10ft x10 no UE support   Fwd/retro on airex beam 10ft x10    Retro walking         Hurdles Fwd on airex beam 10ft x10 Fwd w/ airex between 10x  Fwd+retro 2.0 10x  Fwd/ retro 3 hurdles no UE support 10x  Lateral over 2 hurdles on airex beam 1UE support 5x10ft   Walking with VOR         Standing on bosu          Mini lunges     Reverse and lateral at railing 2x10 ea    Squats on bosu     1x10 at railing with cues to reduce posterior lean     Resisted walking through hoops   Fwd/retro dasha 2.0 10x, in/out 10x   Fwd/retro w/ hurdles on Dasha 3.0 10x Fwd/retro out and in 10x    Wobble board          Walking w/ stop+pivot         Weaving    4 cones fwd/retro 10x       Up & down stairs  Step ups 6\" 7lb in RUE, 1UE support 2x10 ea Step ups 6\" box 1UE support 2x10 ea  4\"-8\"-6\" w/o UE support 10x     Walking on airex beams      W/ dasha resistance 3.0 fwd/retro     Vibration plate    3min w/ mini squats       Bridges  "    2x10 5s hold      Ther Ex         Education         Seated hip IR/ER         HEP         DGI         5xSTS         mCTSIB         6MWT         2MWT         LTR    20x                   Patient treated by YOLI Bee under my direct supervision.

## 2024-04-22 ENCOUNTER — EVALUATION (OUTPATIENT)
Dept: PHYSICAL THERAPY | Facility: CLINIC | Age: 74
End: 2024-04-22
Payer: MEDICARE

## 2024-04-22 DIAGNOSIS — R26.81 UNSTEADY GAIT WHEN WALKING: Primary | ICD-10-CM

## 2024-04-22 DIAGNOSIS — R26.89 IMBALANCE: ICD-10-CM

## 2024-04-22 PROCEDURE — 97110 THERAPEUTIC EXERCISES: CPT | Performed by: PHYSICAL THERAPIST

## 2024-04-22 NOTE — LETTER
2024    Cathryn Lopez MD  [facility]    Patient: Sahara Patel   YOB: 1950   Date of Visit: 2024     Encounter Diagnosis     ICD-10-CM    1. Unsteady gait when walking  R26.81       2. Imbalance  R26.89           Dear Dr. Lopez:    Thank you for your recent referral of Sahara Patel. Please review the attached evaluation summary from Sahara's recent visit.     Please verify that you agree with the plan of care by signing the attached order.     If you have any questions or concerns, please do not hesitate to call.     I sincerely appreciate the opportunity to share in the care of one of your patients and hope to have another opportunity to work with you in the near future.       Sincerely,    Devon Robles, PT      Referring Provider:      I certify that I have read the below Plan of Care and certify the need for these services furnished under this plan of treatment while under my care.                    Cathryn Lopez MD  [facility]  Via Fax: [phone]          PT Re-Evaluation     Today's date: 2024  Patient name: Sahara Patel  : 1950  MRN: [id number]  Referring provider: Cathryn Lopez MD  Dx:   Encounter Diagnosis     ICD-10-CM    1. Unsteady gait when walking  R26.81       2. Imbalance  R26.89           Start Time: 1018  Stop Time: 1100  Total time in clinic (min): 42 minutes  Assessment  Current assessment details: At progress note, Sahara demonstrates improvement of both 2MWT and 6MWT. Pt demonstrated minimal improvement in mCTSIB, 5xSTS, and DGI scores. Despite improvement in 2MWT and 6MWT, pt continues to present with scores outside of age-related normal values for all functional assessments, with exception of DGI, suggesting impairments in functional mobility, decreased endurance, and increased fall risk. Pt continues to demonstrate consistent signs of impaired balance with increased  postural sway, altered gait speed, difficulty pivoting/turning, and disturbance of smooth gait path with horizontal and vertical head turns, suggesting involvement of all balance systems. Additionally, pt continues to demonstrate decreased foot clearance, intermittent shuffling, and narrow base of support, contributing to increased risk of falls. Updated HEP to incorporate additional LE strength and balance exercises for pt to implement daily to maximize gains and promote independence. Sahara Patel would continue to benefit from skilled physical therapy services to further challenge all balance systems to improve safety in her home and in the community and promote return to PLOF, confidence, and activity in order to improve overall QOL.        Impairments: abnormal gait, abnormal or restricted ROM, abnormal movement, activity intolerance, impaired balance, impaired physical strength, lacks appropriate home exercise program, poor posture  and poor body mechanics  Understanding of Dx/Px/POC: good   Prognosis: good    Goals  STG 2-4 weeks:  1. Improve 5xSTS score by >4 seconds indicating improve ability to transfer STS. met  2. Pt will demonstrate increase DGI score by 1-2 points as evidence of decreased imbalance. met  3. Pt will be I with basic HEP. met    LTG 4-8 weeks:  1. Pt will improve 6 minute walk test by > 250ft as evidence of improved functional mobility - in progress   2. Pt will icrease her ABC scale score to >75% - in progress   3. Pt will be I with comprehensive HEP. In progress  4. Improve hip strength by 1/2 grade in all deficient planes. met      Plan  Plan details: HEP development, stretching, strengthening, A/AA/PROM, joint mobilizations, posture education, STM/MI as needed to reduce muscle tension, balance and proprioceptive training, muscle reeducation, PLOC discussed and agreed upon with patient.    Patient would benefit from: PT eval and skilled physical therapy  Planned modality  interventions: cryotherapy and thermotherapy: hydrocollator packs  Planned therapy interventions: manual therapy, neuromuscular re-education, self care, therapeutic activities, therapeutic exercise and home exercise program  Frequency: 2x week  Duration in weeks: 8  Treatment plan discussed with: patient      Subjective Evaluation    History of Present Illness  Current Subjective: Sahara reports she continues to feel like her balance fluctuates however she is having more good days than bad at this point. Pt states she often feels better in the morning, but feels like her energy and balance decreases throughout the day, leading her to feel stiff and guarded. Pt states she has been able to do more activities around her house, including going up and down stairs to do laundry, however sometimes feels unsteady on the steps.       Chief complaints:  Difficulty walking on uneven surface, transfer sit to stand after prolonged inactivity, initial standing after transitioning from sitting    Patient Goals  Patient goals for therapy: improved balance, increased strength and independence with ADLs/IADLs    Social Support  Steps to enter house: yes  4  Stairs in house: yes   4  Lives in: multiple-level home  Lives with: significant other    Employment status: not working  Exercise history: Pilates - 1-2x/wk        Objective     Postural Observations  Seated posture: fair  Standing posture: fair      Neurological Testing     Sensation     Lumbar   Left   Diminished: light touch    Right   Diminished: light touch    Additional Neurological Details  Evidence of 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     Strength/Myotome Testing     Left Hip   Planes of Motion   Flexion: 4+   External rotation: 4+  Internal rotation: 4+    Right Hip   Planes of Motion   Flexion: 4+  External rotation: 4+  Internal rotation: 4+    Left Knee   Flexion: 5  Extension: 5    Right Knee   Flexion: 5  Extension: 5    Left Ankle/Foot   Dorsiflexion:  5  Plantar flexion: 5    Right Ankle/Foot   Dorsiflexion: 5  Plantar flexion: 5    Ambulation     Ambulation: Stairs   Ascend stairs: independent  Pattern: reciprocal  Railings: one rail  Descend stairs: independent  Pattern: reciprocal  Railings: one rail    Additional Stairs Ambulation Details  Requires use of railing to carry objects up and down the stairs     Observational Gait     Additional Observational Gait Details  Intermittent lateral sway, shuffling of feet L>R due to reduced foot clearance  Increased external rotation in L LE through gait cycle   Shortened step length         DGI  MDC: vestibular - 4 pts  MDC: geriatric/community - 3 pts  Falls risk <19/24 5xSTS: Lucila et al 2010  MDC: 2.3 sec  Age Norms:  60-69: 11.1 sec  70-79: 12.6 sec  80-89: 14.8 sec   6 Minute Walk Test  Age Norms  60-69: M - 1876 ft (571.80 m)  F - 1765 ft (537.98 m)  70-79: M - 1729 ft (527.00 m)  F - 1545 ft (470.92 m)  80-89: M - 1368 ft (416.97 m)  F - 1286 ft (391.97 m) mCTSIB  Norm: 20-60 yrs  Eyes open firm: norm sway 0.21-0.48  Eyes closed firm: norm sway 0.48-0.99  Eyes open foam: norm sway 0.38-0.71  Eyes closed foam: norm sway 0.70-2.22       Outcome Measures Initial Eval   2/21/2024  3/21/2024 4/22/2024       5xSTS 22.06s 17.00 1LOB 15.44s  Trial 1: 23.43s  Trial 2: 19.09s    UE assist       mCTSIB  - FTEO (firm)  - FTEC (firm)  - FTEO (foam)  - FTEC (foam) 0.94  1.28  1.38  2.75 w/ 1 grab of HR  Composite:1.58 0.61  1.27  1.50  2.77 w/ 1 grab of HR    Composite: 1.53 0.91  1.23  1.42  3.06 w/ 1 grab of HR      Composite:  1.65 0.96   1.16   1.27   3.34     Composite: 1.68        6MWT 1100ft 1275ft 1250ft 1LOB while turning  1300ft        2MWT 375ft 440ft 400ft 450ft        DGI 19/24 21/24 21/24 21/24       ABC  70.6%  57.5%                  Precautions: Imbalance/Falls Risk    Access Code: [email or web address]  URL: [email or web address]  Date: 04/22/2024  Prepared by: Devon Robles    Exercises  - Mini Squat with  Chair  - 1 x daily - 7 x weekly - 2 sets - 10 reps  - Standing Hip Flexion  - 1 x daily - 7 x weekly - 2 sets - 10 reps  - Standing Tandem Balance with Counter Support  - 1 x daily - 7 x weekly - 3 sets - 10 reps  - Mini Squat with Counter Support  - 1 x daily - 7 x weekly - 2 sets - 10 reps  - Step Up  - 1 x daily - 7 x weekly - 3 sets - 10 reps  - Supine Bridge  - 1 x daily - 7 x weekly - 2 sets - 10 reps  - Supine Lower Trunk Rotation  - 1 x daily - 7 x weekly - 2 sets - 10 reps  - Walking with Head Rotation  - 1 x daily - 7 x weekly - 3 sets - 10 reps  - Backward Tandem Walking with Counter Support  - 1 x daily - 7 x weekly - 3 sets - 10 reps  - Tandem Walking with Counter Support  - 1 x daily - 7 x weekly - 3 sets - 10 reps  - Sit to Stand with Hands on Knees  - 1 x daily - 7 x weekly - 2 sets - 10 reps     Insurance:  AMA/CMS Eval/ Re-eval Auth #/ Referral # Total units  Start date  Expiration date Extension  Visit limitation?  PT only or  PT+OT? Co-Insurance   CMS 1/22/24 NA       100% after deduct    2/21/24 NA           3/21/24 NA           4/22/24             POC Start Date POC Expiration Date Signed POC?   1/22/24 3/18/24 Pend   2/21/2024 4/17/2024 Pending    3/21/2024 5/16/2024 yes   4/22/2024 6/17/2024 Pending        Date               Visits/Units:  Used               Authed:  Remaining                     Manuals 4/22 4/18 4/15 4/12 4/8    24  23 22 21 20                           Neuro Re-Ed        STS  Mini squat 5lb w/ chair behind  Mini squat 4lb w/ chair behind  2x10  2x10 cues to widen YOLI   SLS hip flexion        Side step        1/2 romberg w/EC        Tandem stance        Romberg on airex  W/ ball pass behind 20x ea way       Side stepping on airex beam  10ft x10    W/ hurdles 10x at table  10ft x 10     W/ hurdles 10x      Tandem walking  Fwd on airex beam 10ft x10 1UE support at table    Fwd on airex beam 10ft x10 no UE support    Retro walking        Hurdles  Fwd on airex beam 10ft x10 Fwd  "w/ airex between 10x  Fwd+retro 2.0 10x  Fwd/ retro 3 hurdles no UE support 10x   Walking with VOR        Standing on bosu         Mini lunges        Squats on bosu        Resisted walking through hoops    Fwd/retro kathryn 2.0 10x, in/out 10x     Wobble board         Walking w/ stop+pivot        Weaving     4 cones fwd/retro 10x     Up & down stairs   Step ups 6\" 7lb in RUE, 1UE support 2x10 ea Step ups 6\" box 1UE support 2x10 ea    Walking on airex beams         Vibration plate     3min w/ mini squats     Bridges      2x10 5s hold    Ther Ex        Education        Seated hip IR/ER        HEP Updated        DGI Performed        5xSTS Performed        mCTSIB Performed        6MWT Performed        2MWT Performed        LTR     20x                Patient treated by YOLI Bee under my direct supervision.           Attestation signed by Devon Robles PT at 4/22/2024  4:04 PM:  I supervised the visit.  We discussed the case to ensure appropriate continuation and progression of care and I reviewed the documentation.                   "

## 2024-04-22 NOTE — PROGRESS NOTES
PT Re-Evaluation     Today's date: 2024  Patient name: Sahara Patel  : 1950  MRN: [id number]  Referring provider: Cathryn Lopez MD  Dx:   Encounter Diagnosis     ICD-10-CM    1. Unsteady gait when walking  R26.81       2. Imbalance  R26.89           Start Time: 1018  Stop Time: 1100  Total time in clinic (min): 42 minutes  Assessment  Current assessment details: At progress note, Sahara demonstrates improvement of both 2MWT and 6MWT. Pt demonstrated minimal improvement in mCTSIB, 5xSTS, and DGI scores. Despite improvement in 2MWT and 6MWT, pt continues to present with scores outside of age-related normal values for all functional assessments, with exception of DGI, suggesting impairments in functional mobility, decreased endurance, and increased fall risk. Pt continues to demonstrate consistent signs of impaired balance with increased postural sway, altered gait speed, difficulty pivoting/turning, and disturbance of smooth gait path with horizontal and vertical head turns, suggesting involvement of all balance systems. Additionally, pt continues to demonstrate decreased foot clearance, intermittent shuffling, and narrow base of support, contributing to increased risk of falls. Updated HEP to incorporate additional LE strength and balance exercises for pt to implement daily to maximize gains and promote independence. Sahara Patel would continue to benefit from skilled physical therapy services to further challenge all balance systems to improve safety in her home and in the community and promote return to PLOF, confidence, and activity in order to improve overall QOL.        Impairments: abnormal gait, abnormal or restricted ROM, abnormal movement, activity intolerance, impaired balance, impaired physical strength, lacks appropriate home exercise program, poor posture  and poor body mechanics  Understanding of Dx/Px/POC: good   Prognosis: good    Goals  STG 2-4 weeks:  1. Improve 5xSTS score  by >4 seconds indicating improve ability to transfer STS. met  2. Pt will demonstrate increase DGI score by 1-2 points as evidence of decreased imbalance. met  3. Pt will be I with basic HEP. met    LTG 4-8 weeks:  1. Pt will improve 6 minute walk test by > 250ft as evidence of improved functional mobility - in progress   2. Pt will icrease her ABC scale score to >75% - in progress   3. Pt will be I with comprehensive HEP. In progress  4. Improve hip strength by 1/2 grade in all deficient planes. met      Plan  Plan details: HEP development, stretching, strengthening, A/AA/PROM, joint mobilizations, posture education, STM/MI as needed to reduce muscle tension, balance and proprioceptive training, muscle reeducation, PLOC discussed and agreed upon with patient.    Patient would benefit from: PT eval and skilled physical therapy  Planned modality interventions: cryotherapy and thermotherapy: hydrocollator packs  Planned therapy interventions: manual therapy, neuromuscular re-education, self care, therapeutic activities, therapeutic exercise and home exercise program  Frequency: 2x week  Duration in weeks: 8  Treatment plan discussed with: patient      Subjective Evaluation    History of Present Illness  Current Subjective: Sahara reports she continues to feel like her balance fluctuates however she is having more good days than bad at this point. Pt states she often feels better in the morning, but feels like her energy and balance decreases throughout the day, leading her to feel stiff and guarded. Pt states she has been able to do more activities around her house, including going up and down stairs to do laundry, however sometimes feels unsteady on the steps.       Chief complaints:  Difficulty walking on uneven surface, transfer sit to stand after prolonged inactivity, initial standing after transitioning from sitting    Patient Goals  Patient goals for therapy: improved balance, increased strength and independence  with ADLs/IADLs    Social Support  Steps to enter house: yes  4  Stairs in house: yes   4  Lives in: multiple-level home  Lives with: significant other    Employment status: not working  Exercise history: Pilates - 1-2x/wk        Objective     Postural Observations  Seated posture: fair  Standing posture: fair      Neurological Testing     Sensation     Lumbar   Left   Diminished: light touch    Right   Diminished: light touch    Additional Neurological Details  Evidence of 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     Strength/Myotome Testing     Left Hip   Planes of Motion   Flexion: 4+   External rotation: 4+  Internal rotation: 4+    Right Hip   Planes of Motion   Flexion: 4+  External rotation: 4+  Internal rotation: 4+    Left Knee   Flexion: 5  Extension: 5    Right Knee   Flexion: 5  Extension: 5    Left Ankle/Foot   Dorsiflexion: 5  Plantar flexion: 5    Right Ankle/Foot   Dorsiflexion: 5  Plantar flexion: 5    Ambulation     Ambulation: Stairs   Ascend stairs: independent  Pattern: reciprocal  Railings: one rail  Descend stairs: independent  Pattern: reciprocal  Railings: one rail    Additional Stairs Ambulation Details  Requires use of railing to carry objects up and down the stairs     Observational Gait     Additional Observational Gait Details  Intermittent lateral sway, shuffling of feet L>R due to reduced foot clearance  Increased external rotation in L LE through gait cycle   Shortened step length         DGI  MDC: vestibular - 4 pts  MDC: geriatric/community - 3 pts  Falls risk <19/24 5xSTS: Lucila et al 2010  MDC: 2.3 sec  Age Norms:  60-69: 11.1 sec  70-79: 12.6 sec  80-89: 14.8 sec   6 Minute Walk Test  Age Norms  60-69: M - 1876 ft (571.80 m)  F - 1765 ft (537.98 m)  70-79: M - 1729 ft (527.00 m)  F - 1545 ft (470.92 m)  80-89: M - 1368 ft (416.97 m)  F - 1286 ft (391.97 m) mCTSIB  Norm: 20-60 yrs  Eyes open firm: norm sway 0.21-0.48  Eyes closed firm: norm sway 0.48-0.99  Eyes open foam: norm sway  0.38-0.71  Eyes closed foam: norm sway 0.70-2.22       Outcome Measures Initial Eval   2/21/2024  3/21/2024 4/22/2024       5xSTS 22.06s 17.00 1LOB 15.44s  Trial 1: 23.43s  Trial 2: 19.09s    UE assist       mCTSIB  - FTEO (firm)  - FTEC (firm)  - FTEO (foam)  - FTEC (foam) 0.94  1.28  1.38  2.75 w/ 1 grab of HR  Composite:1.58 0.61  1.27  1.50  2.77 w/ 1 grab of HR    Composite: 1.53 0.91  1.23  1.42  3.06 w/ 1 grab of HR      Composite:  1.65 0.96   1.16   1.27   3.34     Composite: 1.68        6MWT 1100ft 1275ft 1250ft 1LOB while turning  1300ft        2MWT 375ft 440ft 400ft 450ft        DGI 19/24 21/24 21/24 21/24       ABC  70.6%  57.5%                  Precautions: Imbalance/Falls Risk    Access Code: [email or web address]  URL: [email or web address]  Date: 04/22/2024  Prepared by: Devon Robles    Exercises  - Mini Squat with Chair  - 1 x daily - 7 x weekly - 2 sets - 10 reps  - Standing Hip Flexion  - 1 x daily - 7 x weekly - 2 sets - 10 reps  - Standing Tandem Balance with Counter Support  - 1 x daily - 7 x weekly - 3 sets - 10 reps  - Mini Squat with Counter Support  - 1 x daily - 7 x weekly - 2 sets - 10 reps  - Step Up  - 1 x daily - 7 x weekly - 3 sets - 10 reps  - Supine Bridge  - 1 x daily - 7 x weekly - 2 sets - 10 reps  - Supine Lower Trunk Rotation  - 1 x daily - 7 x weekly - 2 sets - 10 reps  - Walking with Head Rotation  - 1 x daily - 7 x weekly - 3 sets - 10 reps  - Backward Tandem Walking with Counter Support  - 1 x daily - 7 x weekly - 3 sets - 10 reps  - Tandem Walking with Counter Support  - 1 x daily - 7 x weekly - 3 sets - 10 reps  - Sit to Stand with Hands on Knees  - 1 x daily - 7 x weekly - 2 sets - 10 reps     Insurance:  AMA/CMS Eval/ Re-eval Auth #/ Referral # Total units  Start date  Expiration date Extension  Visit limitation?  PT only or  PT+OT? Co-Insurance   CMS 1/22/24 NA       100% after deduct    2/21/24 NA           3/21/24 NA           4/22/24             POC Start Date  "POC Expiration Date Signed POC?   1/22/24 3/18/24 Pend   2/21/2024 4/17/2024 Pending    3/21/2024 5/16/2024 yes   4/22/2024 6/17/2024 Pending        Date               Visits/Units:  Used               Authed:  Remaining                     Manuals 4/22 4/18 4/15 4/12 4/8    24  23 22 21 20                           Neuro Re-Ed        STS  Mini squat 5lb w/ chair behind  Mini squat 4lb w/ chair behind  2x10  2x10 cues to widen YOLI   SLS hip flexion        Side step        1/2 romberg w/EC        Tandem stance        Romberg on airex  W/ ball pass behind 20x ea way       Side stepping on airex beam  10ft x10    W/ hurdles 10x at table  10ft x 10     W/ hurdles 10x      Tandem walking  Fwd on airex beam 10ft x10 1UE support at table    Fwd on airex beam 10ft x10 no UE support    Retro walking        Hurdles  Fwd on airex beam 10ft x10 Fwd w/ airex between 10x  Fwd+retro 2.0 10x  Fwd/ retro 3 hurdles no UE support 10x   Walking with VOR        Standing on bosu         Mini lunges        Squats on bosu        Resisted walking through hoops    Fwd/retro kathryn 2.0 10x, in/out 10x     Wobble board         Walking w/ stop+pivot        Weaving     4 cones fwd/retro 10x     Up & down stairs   Step ups 6\" 7lb in RUE, 1UE support 2x10 ea Step ups 6\" box 1UE support 2x10 ea    Walking on airex beams         Vibration plate     3min w/ mini squats     Bridges      2x10 5s hold    Ther Ex        Education        Seated hip IR/ER        HEP Updated        DGI Performed        5xSTS Performed        mCTSIB Performed        6MWT Performed        2MWT Performed        LTR     20x                Patient treated by Lynda Fierro, SPT under my direct supervision.         "

## 2024-04-25 ENCOUNTER — OFFICE VISIT (OUTPATIENT)
Dept: PHYSICAL THERAPY | Facility: CLINIC | Age: 74
End: 2024-04-25
Payer: MEDICARE

## 2024-04-25 DIAGNOSIS — R26.89 IMBALANCE: ICD-10-CM

## 2024-04-25 DIAGNOSIS — R26.81 UNSTEADY GAIT WHEN WALKING: Primary | ICD-10-CM

## 2024-04-25 PROCEDURE — 97112 NEUROMUSCULAR REEDUCATION: CPT | Performed by: PHYSICAL THERAPIST

## 2024-04-25 NOTE — PROGRESS NOTES
Daily Note     Today's date: 2024  Patient name: Sahara Patel  : 1950  MRN: [id number]  Referring provider: Cathryn Lopez MD  Dx:   Encounter Diagnosis     ICD-10-CM    1. Unsteady gait when walking  R26.81       2. Imbalance  R26.89           Start Time: 1017  Stop Time: 1100  Total time in clinic (min): 43 minutes    Subjective: Sahara reports having a rough day yesterday due to feeling very stiff and fatigued. Pt reports she was feeling better by the end of the day and improved today.      Objective: See treatment diary below      Assessment: Tolerated treatment well. Sahara demonstrates continued difficulty on compliant surfaces and requires cueing to reduce reliance on UE support. Pt showed improvement with maintaining static balance while performing horizontal head turns and was able to tolerate activity for longer duration prior to requiring seated rest. Discussed transitioning to independence with HEP after next week with pt in agreement. Patient would benefit from continued PT      Plan: Continue per plan of care.  Progress treatment as tolerated.       Precautions: Imbalance/Falls Risk    Access Code: [email or web address]  URL: [email or web address]  Date: 2024  Prepared by: Devon Robles    Exercises  - Mini Squat with Chair  - 1 x daily - 7 x weekly - 2 sets - 10 reps  - Standing Hip Flexion  - 1 x daily - 7 x weekly - 2 sets - 10 reps  - Standing Tandem Balance with Counter Support  - 1 x daily - 7 x weekly - 3 sets - 10 reps  - Mini Squat with Counter Support  - 1 x daily - 7 x weekly - 2 sets - 10 reps  - Step Up  - 1 x daily - 7 x weekly - 3 sets - 10 reps  - Supine Bridge  - 1 x daily - 7 x weekly - 2 sets - 10 reps  - Supine Lower Trunk Rotation  - 1 x daily - 7 x weekly - 2 sets - 10 reps  - Walking with Head Rotation  - 1 x daily - 7 x weekly - 3 sets - 10 reps  - Backward Tandem Walking with Counter Support  - 1 x daily - 7 x weekly - 3 sets - 10 reps  - Tandem Walking  "with Counter Support  - 1 x daily - 7 x weekly - 3 sets - 10 reps  - Sit to Stand with Hands on Knees  - 1 x daily - 7 x weekly - 2 sets - 10 reps     Insurance:  AMA/CMS Eval/ Re-eval Auth #/ Referral # Total units  Start date  Expiration date Extension  Visit limitation?  PT only or  PT+OT? Co-Insurance   CMS 1/22/24 NA       100% after deduct    2/21/24 NA           3/21/24 NA           4/22/24             POC Start Date POC Expiration Date Signed POC?   1/22/24 3/18/24 Pend   2/21/2024 4/17/2024 Pending    3/21/2024 5/16/2024 yes   4/22/2024 6/17/2024 Pending        Date               Visits/Units:  Used               Authed:  Remaining                     Manuals 4/25 4/22 4/18 4/15 4/12    25 24  23 22 21                           Neuro Re-Ed        STS 10x   Mini squat 5lb w/ chair behind  Mini squat 4lb w/ chair behind  2x10    SLS hip flexion        Side step        1/2 romberg w/EC        Tandem stance        Romberg on airex   W/ ball pass behind 20x ea way      Side stepping on airex beam   10ft x10    W/ hurdles 10x at table  10ft x 10     W/ hurdles 10x     Tandem walking   Fwd on airex beam 10ft x10 1UE support at table      Retro walking        Hurdles Fwd and lateral w/ airex between 2x10 ea    Retro 15x   Fwd on airex beam 10ft x10 Fwd w/ airex between 10x  Fwd+retro 2.0 10x    Walking with VOR        Standing on bosu         Mini lunges        Squats on bosu        Fwd/ retro walking Fwd and back on airex beam with hand held assist 2x10        Resisted walking through hoops Fwd/retro kathryn 3.0 10x     Fwd/retro kathryn 2.0 10x, in/out 10x    Wobble board         Walking w/ stop+pivot        Weaving      4 cones fwd/retro 10x    Up & down stairs Step ups 4\" + airex 1UE support 2x10 ea   Step ups 6\" 7lb in RUE, 1UE support 2x10 ea Step ups 6\" box 1UE support 2x10 ea   Walking on airex beams         Vibration plate      3min w/ mini squats    Bridges         Ther Ex        Education        Seated hip " IR/ER        HEP  Updated       DGI  Performed       5xSTS  Performed       mCTSIB  Performed       6MWT  Performed       2MWT  Performed       LTR                    Patient treated by YOLI Bee under my direct supervision.

## 2024-04-29 ENCOUNTER — OFFICE VISIT (OUTPATIENT)
Dept: PHYSICAL THERAPY | Facility: CLINIC | Age: 74
End: 2024-04-29
Payer: MEDICARE

## 2024-04-29 DIAGNOSIS — R26.89 IMBALANCE: ICD-10-CM

## 2024-04-29 DIAGNOSIS — R26.81 UNSTEADY GAIT WHEN WALKING: Primary | ICD-10-CM

## 2024-04-29 PROCEDURE — 97112 NEUROMUSCULAR REEDUCATION: CPT | Performed by: PHYSICAL THERAPIST

## 2024-04-29 NOTE — PROGRESS NOTES
Daily Note     Today's date: 2024  Patient name: Sahara Patel  : 1950  MRN: [id number]  Referring provider: Cathryn Lopez MD  Dx:   Encounter Diagnosis     ICD-10-CM    1. Unsteady gait when walking  R26.81       2. Imbalance  R26.89                      Subjective: Sahara Patel reports she felt very tired and unbalance this weekend.  She reports she was very busy this weekend cleaning the garage and organizing papers.        Objective: See treatment diary below      Assessment: Tolerated treatment well. Patient demonstrated fatigue post treatment while requiring cues to reduce reliance of UE support to challenge balance reactions.  She continues with tendency       Plan: Continue per plan of care.      Precautions: Imbalance/Falls Risk    Access Code: [email or web address]  URL: [email or web address]  Date: 2024  Prepared by: Devon Robles    Exercises  - Mini Squat with Chair  - 1 x daily - 7 x weekly - 2 sets - 10 reps  - Standing Hip Flexion  - 1 x daily - 7 x weekly - 2 sets - 10 reps  - Standing Tandem Balance with Counter Support  - 1 x daily - 7 x weekly - 3 sets - 10 reps  - Mini Squat with Counter Support  - 1 x daily - 7 x weekly - 2 sets - 10 reps  - Step Up  - 1 x daily - 7 x weekly - 3 sets - 10 reps  - Supine Bridge  - 1 x daily - 7 x weekly - 2 sets - 10 reps  - Supine Lower Trunk Rotation  - 1 x daily - 7 x weekly - 2 sets - 10 reps  - Walking with Head Rotation  - 1 x daily - 7 x weekly - 3 sets - 10 reps  - Backward Tandem Walking with Counter Support  - 1 x daily - 7 x weekly - 3 sets - 10 reps  - Tandem Walking with Counter Support  - 1 x daily - 7 x weekly - 3 sets - 10 reps  - Sit to Stand with Hands on Knees  - 1 x daily - 7 x weekly - 2 sets - 10 reps     Insurance:  AMA/CMS Eval/ Re-eval Auth #/ Referral # Total units  Start date  Expiration date Extension  Visit limitation?  PT only or  PT+OT? Co-Insurance   CMS 24 NA       100% after deduct    24  "NA           3/21/24 NA           4/22/24             POC Start Date POC Expiration Date Signed POC?   1/22/24 3/18/24 Pend   2/21/2024 4/17/2024 Pending    3/21/2024 5/16/2024 yes   4/22/2024 6/17/2024 Pending        Date               Visits/Units:  Used               Authed:  Remaining                     Manuals 4/29 4/25 4/22 4/18 4/15    26 25 24  23 22                           Neuro Re-Ed        STS 10x/5x 10x   Mini squat 5lb w/ chair behind  Mini squat 4lb w/ chair behind    SLS on/off horse 2x10 B       romberg w/EC w/CS rot 2x10 w/ close supervision       Tandem walk  Fwd/retr [id number] 1 UE and close supervision       Romberg on airex    W/ ball pass behind 20x ea way     Side stepping on airex beam    10ft x10    W/ hurdles 10x at table  10ft x 10     W/ hurdles 10x    Tandem walking    Fwd on airex beam 10ft x10 1UE support at table     Side stepping through hex/ladder (4 hex) 10xB intermittent UE support on table       Hurdles  Fwd and lateral w/ airex between 2x10 ea    Retro 15x   Fwd on airex beam 10ft x10 Fwd w/ airex between 10x    Walking with VOR        Standing on bosu         Mini lunges        Squats on bosu        Fwd/ retro walking  Fwd and back on airex beam with hand held assist 2x10       Resisted walking through hoops  Fwd/retro kathryn 3.0 10x       Wobble board         Walking w/ stop+pivot        Weaving         Up & down stairs  Step ups 4\" + airex 1UE support 2x10 ea   Step ups 6\" 7lb in RUE, 1UE support 2x10 ea   Walking on airex beams         Vibration plate         Bridges         Ther Ex        Education        Seated hip IR/ER        HEP   Updated      DGI   Performed      5xSTS   Performed      mCTSIB   Performed      6MWT   Performed      2MWT   Performed      LTR                             "

## 2024-04-30 ENCOUNTER — APPOINTMENT (OUTPATIENT)
Dept: PHYSICAL THERAPY | Facility: CLINIC | Age: 74
End: 2024-04-30
Payer: MEDICARE

## 2024-05-02 ENCOUNTER — APPOINTMENT (OUTPATIENT)
Dept: PHYSICAL THERAPY | Facility: CLINIC | Age: 74
End: 2024-05-02
Payer: MEDICARE

## 2024-05-08 ENCOUNTER — OFFICE VISIT (OUTPATIENT)
Dept: PHYSICAL THERAPY | Facility: CLINIC | Age: 74
End: 2024-05-08
Payer: MEDICARE

## 2024-05-08 DIAGNOSIS — R26.89 IMBALANCE: ICD-10-CM

## 2024-05-08 DIAGNOSIS — R26.81 UNSTEADY GAIT WHEN WALKING: Primary | ICD-10-CM

## 2024-05-08 PROCEDURE — 97112 NEUROMUSCULAR REEDUCATION: CPT | Performed by: PHYSICAL THERAPIST

## 2024-05-08 NOTE — PROGRESS NOTES
Daily Note     Today's date: 2024  Patient name: Sahara Patel  : 1950  MRN: [id number]  Referring provider: Cathryn Lopez MD  Dx:   Encounter Diagnosis     ICD-10-CM    1. Unsteady gait when walking  R26.81       2. Imbalance  R26.89                      Subjective: Sahara Patel states she was sick last week which led her to be more fatigued and less active overall.  She still feels a little more fatigued in general this week.       Objective: See treatment diary below      Assessment: Tolerated treatment well. Patient with evidence of low endurance with standing activity with need for standing/sitting rest breaks.  She also continues with evidence of imbalance during standing balance activity especially with VOR head movements requiring close supervision for safety.      Plan: Continue per plan of care.      Precautions: Imbalance/Falls Risk    Access Code: [email or web address]  URL: [email or web address]  Date: 2024  Prepared by: Devon Robles    Exercises  - Supine Bridge  - 1 x daily - 7 x weekly - 2 sets - 10 reps  - Supine Lower Trunk Rotation  - 1 x daily - 7 x weekly - 2 sets - 10 reps  - Walking with Head Rotation  - 1 x daily - 7 x weekly - 3 sets - 10 reps  - Tandem Walking with Counter Support  - 1 x daily - 7 x weekly - 3 sets - 10 reps  - Sit to Stand with Hands on Knees  - 3 x weekly - 2 sets - 10 reps  - On/Off horse  - 3 x weekly - 2 sets - 10 reps  - Step Up  - 3 x weekly - 3 sets - 10 reps     Insurance:  AMA/CMS Eval/ Re-eval Auth #/ Referral # Total units  Start date  Expiration date Extension  Visit limitation?  PT only or  PT+OT? Co-Insurance   CMS 24 NA       100% after deduct    24 NA           3/21/24 NA           24             POC Start Date POC Expiration Date Signed POC?   1/22/24 3/18/24 Pend   2024 Pending    3/21/2024 2024 yes   2024 Yes      Date               Visits/Units:  Used               Authed:   "Remaining                     Manuals 5/8 4/29 4/25 4/22 4/18    27 26 25 24  23                           Neuro Re-Ed        STS 2x10 10x/5x 10x   Mini squat 5lb w/ chair behind    SLS on/off horse 2x10 B 2x10 B      romberg w/EC w/CS rot  2x10 w/ close supervision      Tandem walk  Fwd/retr [id number] 1 UE and close supervision [id number] 1 UE and close supervision      Romberg on airex     W/ ball pass behind 20x ea way    Side stepping on airex beam     10ft x10    W/ hurdles 10x at table    Tandem walking     Fwd on airex beam 10ft x10 1UE support at table    Side stepping through hex/ladder At table - no ladder  [id number] cues to improve foot clearance (4 hex) 10xB intermittent UE support on table      Hurdles   Fwd and lateral w/ airex between 2x10 ea    Retro 15x   Fwd on airex beam 10ft x10   Walking with VOR H + V  5x40ft ea close supervision       Fwd/ retro walking   Fwd and back on airex beam with hand held assist 2x10      Resisted walking through hoops   Fwd/retro kathryn 3.0 10x      Up & down stairs   Step ups 4\" + airex 1UE support 2x10 ea     Bridges  1x10 5s       Ther Ex        Education        Seated hip IR/ER        HEP    Updated     DGI    Performed     5xSTS    Performed     mCTSIB    Performed     6MWT    Performed     2MWT    Performed     LTR 15x                              "

## 2024-05-15 ENCOUNTER — APPOINTMENT (OUTPATIENT)
Dept: PHYSICAL THERAPY | Facility: CLINIC | Age: 74
End: 2024-05-15
Payer: MEDICARE

## 2024-05-21 ENCOUNTER — OFFICE VISIT (OUTPATIENT)
Dept: PHYSICAL THERAPY | Facility: CLINIC | Age: 74
End: 2024-05-21
Payer: MEDICARE

## 2024-05-21 DIAGNOSIS — R26.89 IMBALANCE: ICD-10-CM

## 2024-05-21 DIAGNOSIS — R26.81 UNSTEADY GAIT WHEN WALKING: Primary | ICD-10-CM

## 2024-05-21 PROCEDURE — 97110 THERAPEUTIC EXERCISES: CPT | Performed by: PHYSICAL THERAPIST

## 2024-05-21 PROCEDURE — 97112 NEUROMUSCULAR REEDUCATION: CPT | Performed by: PHYSICAL THERAPIST

## 2024-05-21 NOTE — PROGRESS NOTES
Daily Note     Today's date: 2024  Patient name: Sahara Patel  : 1950  MRN: [id number]  Referring provider: Cathryn Lopez MD  Dx:   Encounter Diagnosis     ICD-10-CM    1. Unsteady gait when walking  R26.81       2. Imbalance  R26.89                      Subjective: Sahara Patel reports she fell last week while going down stairs in her socks.  She states she hit her right shoulder into the wall and broke the drywall.  She has been sore since especially in the pelvic area and in right shoulder blade.  Both areas are slowly improving each day since her fall.  She feels in general her balance and progress have had a setback as a result.  She does complain of right knee pain today after doing a lot on her feet around the home yesterday.      Objective: See treatment diary below      Assessment: Tolerated treatment fair. Patient with signs of muscle soreness in right distal quad and decreased general endurance levels today vs prior to her fall.  She required frequent rest due to fatigue, slight increased evidence of imbalance with walking and definite increased fear of falling.        Plan: Continue per plan of care.  To address new deficits from fall with goal to still work towards transition to I HEP.     Precautions: Imbalance/Falls Risk    Access Code: [email or web address]  URL: [email or web address]  Date: 2024  Prepared by: Devon Robles    Exercises  - Supine Bridge  - 1 x daily - 7 x weekly - 2 sets - 10 reps  - Supine Lower Trunk Rotation  - 1 x daily - 7 x weekly - 2 sets - 10 reps  - Walking with Head Rotation  - 1 x daily - 7 x weekly - 3 sets - 10 reps  - Tandem Walking with Counter Support  - 1 x daily - 7 x weekly - 3 sets - 10 reps  - Sit to Stand with Hands on Knees  - 3 x weekly - 2 sets - 10 reps  - On/Off horse  - 3 x weekly - 2 sets - 10 reps  - Step Up  - 3 x weekly - 3 sets - 10 reps     Insurance:  AMA/CMS Eval/ Re-eval Auth #/ Referral # Total units  Start date   "Expiration date Extension  Visit limitation?  PT only or  PT+OT? Co-Insurance   CMS 1/22/24 NA       100% after deduct    2/21/24 NA           3/21/24 NA           4/22/24             POC Start Date POC Expiration Date Signed POC?   1/22/24 3/18/24 Pend   2/21/2024 4/17/2024 Pending    3/21/2024 5/16/2024 yes   4/22/2024 6/17/2024 Yes      Date               Visits/Units:  Used               Authed:  Remaining                     Manuals 5/21 5/8 4/29 4/25 4/22 28 27 26 25 24                            Neuro Re-Ed        Side step at rail 3x20ft +  2x30ft       STS 10x from chair w/2airex 2x10 10x/5x 10x     SLS on/off horse  2x10 B 2x10 B     romberg w/EC w/CS rot   2x10 w/ close supervision     Tandem walk  Fwd/retr  [id number] 1 UE and close supervision [id number] 1 UE and close supervision     Quad sets 10x5s       SLR w/abd stab 2x10       Bridge 2x10       Side stepping through hex/ladder  At table - no ladder  [id number] cues to improve foot clearance (4 hex) 10xB intermittent UE support on table     Hurdles    Fwd and lateral w/ airex between 2x10 ea    Retro 15x     Walking with VOR  H + V  5x40ft ea close supervision      Fwd/ retro walking    Fwd and back on airex beam with hand held assist 2x10     Resisted walking through hoops    Fwd/retro kathryn 3.0 10x     Up & down stairs    Step ups 4\" + airex 1UE support 2x10 ea    Bridges   1x10 5s      Ther Ex        Education        Heel slides 2x10       HEP     Updated    DGI     Performed    5xSTS     Performed    mCTSIB     Performed    6MWT     Performed    2MWT     Performed    LTR  15x      Stand ham curls 2x10                         "

## 2024-05-30 ENCOUNTER — EVALUATION (OUTPATIENT)
Dept: PHYSICAL THERAPY | Facility: CLINIC | Age: 74
End: 2024-05-30
Payer: MEDICARE

## 2024-05-30 DIAGNOSIS — R26.89 IMBALANCE: ICD-10-CM

## 2024-05-30 DIAGNOSIS — R26.81 UNSTEADY GAIT WHEN WALKING: Primary | ICD-10-CM

## 2024-05-30 PROCEDURE — 97110 THERAPEUTIC EXERCISES: CPT | Performed by: PHYSICAL THERAPIST

## 2024-05-30 NOTE — PROGRESS NOTES
Re-evaluation    Today's date: 2024  Patient name: Sahara Patel  : 1950  MRN: [id number]  Referring provider: Cathryn Lopez MD  Dx:   Encounter Diagnosis     ICD-10-CM    1. Unsteady gait when walking  R26.81       2. Imbalance  R26.89                      Assessment  Current assessment details: Sahara Patel presents for progress note about 2 weeks following a recent fall.  During todays assessment she demonstrated a decline in her functional outcome scores compared to last evaluation including, decreased 2+6 minute walk tests.  She also demos increased imbalance and higher risk of falls seen with dynamic gait index score regressing by 3 points and higher level of sway seen during mCTSIB on bi[employer].  She also reports feeling she had taken some steps backwards in her mobility and increased fear of falling.  Due to this recent setback in progress she will benefit from continued therapy to address higher imbalance levels, lower walking speed and mechanics while continuing to work towards an independent HEP to address these above deficits, improve balance and functional mobility levels with decreased risk of falls.     Impairments: abnormal gait, abnormal or restricted ROM, abnormal movement, activity intolerance, impaired balance, impaired physical strength, lacks appropriate home exercise program, poor posture  and poor body mechanics  Understanding of Dx/Px/POC: good   Prognosis: good    Goals  STG 2-4 weeks:  1. Improve 5xSTS score by >4 seconds indicating improve ability to transfer STS. met  2. Pt will demonstrate increase DGI score by 1-2 points as evidence of decreased imbalance. - Regressed  3. Pt will be I with basic HEP. met    LTG 4-8 weeks:  1. Pt will improve 6 minute walk test by > 250ft as evidence of improved functional mobility - Not Met   2. Pt will increase her ABC scale score to >75% - in progress   3. Pt will be I with comprehensive HEP. In progress  4. Improve hip strength  by 1/2 grade in all deficient planes. Met  5. Pt will demo >21/24 on dynamic gait index.      Plan  Plan details: HEP development, stretching, strengthening, A/AA/PROM, joint mobilizations, posture education, STM/MI as needed to reduce muscle tension, balance and proprioceptive training, muscle reeducation, PLOC discussed and agreed upon with patient.    Patient would benefit from: PT eval and skilled physical therapy  Planned modality interventions: cryotherapy and thermotherapy: hydrocollator packs  Planned therapy interventions: manual therapy, neuromuscular re-education, self care, therapeutic activities, therapeutic exercise and home exercise program  Frequency: 2x week  Duration in weeks: 8  Treatment plan discussed with: patient      Subjective Evaluation    History of Present Illness  Current Subjective: Sahara Patel reports since her fall 2 weeks ago her initial soreness from the fall has subsided but she feels more unsteady and weaker vs prior to fall.  She feels she has gone backwards in her progress and is more afraid of falling.  She reports she is unable to walk prolonged distances and believes she will need to utilize a wheel chair in the airport for her upcoming trip as a result.  She also relies on holding onto her significant other frequently while walking outdoors.      Patient Goals  Patient goals for therapy: improved balance, increased strength and independence with ADLs/IADLs    Social Support  Steps to enter house: yes  4  Stairs in house: yes   4  Lives in: multiple-level home  Lives with: significant other    Employment status: not working  Exercise history: Pilates - 1-2x/wk        Objective     Postural Observations  Seated posture: fair  Standing posture: fair      Neurological Testing     Sensation     Lumbar   Left   Diminished: light touch    Right   Diminished: light touch    Additional Neurological Details  Evidence of 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     Strength/Myotome  Testing     Left Hip   Planes of Motion   Flexion: 4+   External rotation: 4+  Internal rotation: 4+    Right Hip   Planes of Motion   Flexion: 4+  External rotation: 4+  Internal rotation: 4+    Left Knee   Flexion: 5  Extension: 5    Right Knee   Flexion: 5  Extension: 5    Left Ankle/Foot   Dorsiflexion: 5  Plantar flexion: 5    Right Ankle/Foot   Dorsiflexion: 5  Plantar flexion: 5    Ambulation     Ambulation: Stairs   Ascend stairs: independent  Pattern: reciprocal  Railings: one rail  Descend stairs: independent  Pattern: reciprocal  Railings: one rail    Additional Stairs Ambulation Details  Requires use of railing to carry objects up and down the stairs     Observational Gait     Additional Observational Gait Details  Intermittent lateral sway, shuffling of feet L>R due to reduced foot clearance  Increased external rotation in L LE through gait cycle   Shortened step length         DGI  MDC: vestibular - 4 pts  MDC: geriatric/community - 3 pts  Falls risk <19/24 5xSTS: Lucila et al 2010  MDC: 2.3 sec  Age Norms:  60-69: 11.1 sec  70-79: 12.6 sec  80-89: 14.8 sec   6 Minute Walk Test  Age Norms  60-69: M - 1876 ft (571.80 m)  F - 1765 ft (537.98 m)  70-79: M - 1729 ft (527.00 m)  F - 1545 ft (470.92 m)  80-89: M - 1368 ft (416.97 m)  F - 1286 ft (391.97 m) mCTSIB  Norm: 20-60 yrs  Eyes open firm: norm sway 0.21-0.48  Eyes closed firm: norm sway 0.48-0.99  Eyes open foam: norm sway 0.38-0.71  Eyes closed foam: norm sway 0.70-2.22       Outcome Measures Initial Eval   2/21/2024  3/21/2024 4/22/2024  5/30/24     5xSTS 22.06s 17.00 1LOB 15.44s  Trial 1: 23.43s  Trial 2: 19.09s    UE assist  22.36s no UE support     mCTSIB  - FTEO (firm)  - FTEC (firm)  - FTEO (foam)  - FTEC (foam) 0.94  1.28  1.38  2.75 w/ 1 grab of HR  Composite:1.58 0.61  1.27  1.50  2.77 w/ 1 grab of HR    Composite: 1.53 0.91  1.23  1.42  3.06 w/ 1 grab of HR      Composite:  1.65 0.96   1.16   1.27   3.34     Composite: 1.68   1. 1.00  2.  1.33  3.  1.26  4. 3.39 >5 touches of hand rail  Composite:  1.74     6MWT 1100ft 1275ft 1250ft 1LOB while turning  1300ft   1250ft     2MWT 375ft 440ft 400ft 450ft   410ft     DGI 19/24 21/24 21/24 21/24 18/24     ABC  70.6%  57.5%                Precautions: Imbalance/Falls Risk    Access Code: [email or web address]  URL: [email or web address]  Date: 05/08/2024  Prepared by: Devon Robles    Exercises  - Supine Bridge  - 1 x daily - 7 x weekly - 2 sets - 10 reps  - Supine Lower Trunk Rotation  - 1 x daily - 7 x weekly - 2 sets - 10 reps  - Walking with Head Rotation  - 1 x daily - 7 x weekly - 3 sets - 10 reps  - Tandem Walking with Counter Support  - 1 x daily - 7 x weekly - 3 sets - 10 reps  - Sit to Stand with Hands on Knees  - 3 x weekly - 2 sets - 10 reps  - On/Off horse  - 3 x weekly - 2 sets - 10 reps  - Step Up  - 3 x weekly - 3 sets - 10 reps     Insurance:  AMA/CMS Eval/ Re-eval Auth #/ Referral # Total units  Start date  Expiration date Extension  Visit limitation?  PT only or  PT+OT? Co-Insurance   CMS 1/22/24 NA       100% after deduct    2/21/24 NA           3/21/24 NA           4/22/24             POC Start Date POC Expiration Date Signed POC?   1/22/24 3/18/24 Pend   2/21/2024 4/17/2024 Pending    3/21/2024 5/16/2024 yes   4/22/2024 6/17/2024 Yes      Date               Visits/Units:  Used               Authed:  Remaining                     Manuals 5/30 5/21 5/8 4/29 4/25    29 28 27 26 25                           Neuro Re-Ed        Side step at rail  3x20ft +  2x30ft      STS  10x from chair w/2airex 2x10 10x/5x 10x    SLS on/off horse   2x10 B 2x10 B    romberg w/EC w/CS rot    2x10 w/ close supervision    Tandem walk  Fwd/retr   [id number] 1 UE and close supervision [id number] 1 UE and close supervision    Quad sets  10x5s      SLR w/abd stab  2x10      Bridge  2x10      Side stepping through hex/ladder   At table - no ladder  [id number] cues to improve foot clearance (4 hex) 10xB intermittent UE  "support on table    Hurdles     Fwd and lateral w/ airex between 2x10 ea    Retro 15x    Walking with VOR   H + V  5x40ft ea close supervision     Fwd/ retro walking     Fwd and back on airex beam with hand held assist 2x10    Resisted walking through hoops     Fwd/retro kathryn 3.0 10x    Up & down stairs     Step ups 4\" + airex 1UE support 2x10 ea   Bridges    1x10 5s     Ther Ex        Education Updated POC       Heel slides  2x10      HEP        DGI performed       5xSTS performed       mCTSIB performed       6MWT performed       2MWT performed       LTR   15x     Stand ham curls  2x10                          "

## 2024-06-03 ENCOUNTER — OFFICE VISIT (OUTPATIENT)
Dept: PHYSICAL THERAPY | Facility: CLINIC | Age: 74
End: 2024-06-03
Payer: MEDICARE

## 2024-06-03 DIAGNOSIS — R26.81 UNSTEADY GAIT WHEN WALKING: Primary | ICD-10-CM

## 2024-06-03 DIAGNOSIS — R26.89 IMBALANCE: ICD-10-CM

## 2024-06-03 PROCEDURE — 97112 NEUROMUSCULAR REEDUCATION: CPT | Performed by: PHYSICAL THERAPIST

## 2024-06-03 NOTE — PROGRESS NOTES
Daily Note     Today's date: 6/3/2024  Patient name: Sahara Patel  : 1950  MRN: [id number]  Referring provider: Cathryn Lopez MD  Dx:   Encounter Diagnosis     ICD-10-CM    1. Unsteady gait when walking  R26.81       2. Imbalance  R26.89                      Subjective: Sah[facility] and she is still sore.  She also went to a car show that same day and felt very unsteady walking needing to hold onto her significant other for balance.      Objective: See treatment diary below      Assessment: Tolerated treatment well. Patient demonstrated fatigue post treatment, need for UE support throughout standing balance activities and frequent seated rest breaks due to fatigue.  She also continues to verbalize her fear of falling and evidence of decreased confidence in her balance impacting her functional mobility and leading to increased risk of falls.       Plan: Continue per plan of care.      Precautions: Imbalance/Falls Risk    Access Code: [email or web address]  URL: [email or web address]  Date: 2024  Prepared by: Devon Robles    Exercises  - Supine Bridge  - 1 x daily - 7 x weekly - 2 sets - 10 reps  - Supine Lower Trunk Rotation  - 1 x daily - 7 x weekly - 2 sets - 10 reps  - Walking with Head Rotation  - 1 x daily - 7 x weekly - 3 sets - 10 reps  - Tandem Walking with Counter Support  - 1 x daily - 7 x weekly - 3 sets - 10 reps  - Sit to Stand with Hands on Knees  - 3 x weekly - 2 sets - 10 reps  - On/Off horse  - 3 x weekly - 2 sets - 10 reps  - Step Up  - 3 x weekly - 3 sets - 10 reps     Insurance:  AMA/CMS Eval/ Re-eval Auth #/ Referral # Total units  Start date  Expiration date Extension  Visit limitation?  PT only or  PT+OT? Co-Insurance   CMS 24 NA       100% after deduct    24 NA           3/21/24 NA           24             POC Start Date POC Expiration Date Signed POC?   1/22/24 3/18/24 Pend   2024  4/17/2024 Pending    3/21/2024 5/16/2024 yes   4/22/2024 6/17/2024 Yes      Date               Visits/Units:  Used               Authed:  Remaining                     Manuals 6/3 5/30 5/21 5/8 4/29    30 29 28 27 26                           Neuro Re-Ed        Side step at rail [id number]  3x20ft +  2x30ft     STS Butt taps of elevated high low table  10x  10x from chair w/2airex 2x10 10x/5x   SLS cone tap (march alternate) 1 HR 2x10 B       Side step over cones 2x10 B at rail       SLS on/off horse Seated abd stab 3x5 B   2x10 B 2x10 B   romberg w/EC w/CS rot     2x10 w/ close supervision   Tandem walk  Fwd/retr    [id number] 1 UE and close supervision [id number] 1 UE and close supervision   Quad sets   10x5s     SLR w/abd stab   2x10     Bridge   2x10     Side stepping through hex/ladder    At table - no ladder  [id number] cues to improve foot clearance (4 hex) 10xB intermittent UE support on table   Hurdles        Walking with VOR    H + V  5x40ft ea close supervision    Fwd/ retro walking        Resisted walking through hoops        Up & down stairs        Bridges     1x10 5s    Ther Ex        Education  Updated POC      Heel slides   2x10     HEP        DGI  performed      5xSTS  performed      mCTSIB  performed      6MWT  performed      2MWT  performed      Seated HS str 3x30s B       LTR    15x    Stand ham curls   2x10

## 2024-06-13 ENCOUNTER — OFFICE VISIT (OUTPATIENT)
Dept: PHYSICAL THERAPY | Facility: CLINIC | Age: 74
End: 2024-06-13
Payer: MEDICARE

## 2024-06-13 DIAGNOSIS — R26.81 UNSTEADY GAIT WHEN WALKING: Primary | ICD-10-CM

## 2024-06-13 DIAGNOSIS — R26.89 IMBALANCE: ICD-10-CM

## 2024-06-13 PROCEDURE — 97112 NEUROMUSCULAR REEDUCATION: CPT | Performed by: PHYSICAL THERAPIST

## 2024-06-13 NOTE — PROGRESS NOTES
Daily Note     Today's date: 2024  Patient name: Sahara Patel  : 1950  MRN: [id number]  Referring provider: Cathryn Lopez MD  Dx:   Encounter Diagnosis     ICD-10-CM    1. Unsteady gait when walking  R26.81       2. Imbalance  R26.89                      Subjective: Sahara Patel reports she has been very stressed at home with her significant other recently having surgery and other family issues.  She does feel physically she has recovered from her recent fall but she feels more fatigued, unsteady and worried about falling.      Objective: See treatment diary below      Assessment: Tolerated treatment well. Patient presented today with significant increase in bilateral shuffling of feet with gait, increased imbalance when turning in place and signs of imbalance overall since last session.  She required consistent verbal feedback to improve bilateral foot clearance and step length during all ambulation today.  She is able to easily improve left sided foot clearance but demos decreased SLS on left LE leading to scuffing of right foot from left hip weakness.  She demod good understanding of need to improve mental focus on her quality of walking to reduce her risk of falls and also discussed having family member help bring her attention to her quality of gait when needed.    Plan: Continue per plan of care.      Precautions: Imbalance/Falls Risk    Access Code: [email or web address]  URL: [email or web address]  Date: 2024  Prepared by: Devon Robles    Exercises  - Supine Bridge  - 1 x daily - 7 x weekly - 2 sets - 10 reps  - Supine Lower Trunk Rotation  - 1 x daily - 7 x weekly - 2 sets - 10 reps  - Walking with Head Rotation  - 1 x daily - 7 x weekly - 3 sets - 10 reps  - Tandem Walking with Counter Support  - 1 x daily - 7 x weekly - 3 sets - 10 reps  - Sit to Stand with Hands on Knees  - 3 x weekly - 2 sets - 10 reps  - On/Off horse  - 3 x weekly - 2 sets - 10 reps  - Step Up  - 3 x  weekly - 3 sets - 10 reps     Insurance:  AMA/CMS Eval/ Re-eval Auth #/ Referral # Total units  Start date  Expiration date Extension  Visit limitation?  PT only or  PT+OT? Co-Insurance   CMS 1/22/24 NA       100% after deduct    2/21/24 NA           3/21/24 NA           4/22/24 5/30/24             POC Start Date POC Expiration Date Signed POC?   1/22/24 3/18/24 Pend   2/21/2024 4/17/2024 Pending    3/21/2024 5/16/2024 yes   4/22/2024 6/17/2024 Yes      Date               Visits/Units:  Used               Authed:  Remaining                     Manuals 6/13 6/3 5/30 5/21 5/8    31 30 29 28 27                           Neuro Re-Ed        Side step at rail  [id number]  3x20ft +  2x30ft    STS Butt taps on chair + airex 10x Butt taps of elevated high low table  10x  10x from chair w/2airex 2x10   SLS cone tap (march alternate) 2x10 B 1HR 1 HR 2x10 B      Side step over cones  2x10 B at rail      SLS on/off horse  Seated abd stab 3x5 B   2x10 B   romberg w/EC  3x30s w/ close supervision - evidence of imbalance       Turn in place 180 degrees   10x B by railing and verbal cues to improve foot clearance         STS into walk around gym then return to chair [id number] w/ continued verbal cues for STS mechanics and foot clearance during ambulation       Tandem walk  Fwd/retr     [id number] 1 UE and close supervision   Quad sets    10x5s    SLR w/abd stab    2x10    Bridge    2x10    Side stepping through hex/ladder     At table - no ladder  [id number] cues to improve foot clearance   Walking with VOR     H + V  5x40ft ea close supervision   Bridges      1x10 5s   Ther Ex        Education   Updated POC     Heel slides    2x10    HEP        DGI   performed     5xSTS   performed     mCTSIB   performed     6MWT   performed     2MWT   performed     Seated HS str  3x30s B      LTR     15x   Stand ham curls    2x10

## 2024-06-17 ENCOUNTER — OFFICE VISIT (OUTPATIENT)
Dept: PHYSICAL THERAPY | Facility: CLINIC | Age: 74
End: 2024-06-17
Payer: MEDICARE

## 2024-06-17 DIAGNOSIS — R26.89 IMBALANCE: ICD-10-CM

## 2024-06-17 DIAGNOSIS — R26.81 UNSTEADY GAIT WHEN WALKING: Primary | ICD-10-CM

## 2024-06-17 PROCEDURE — 97112 NEUROMUSCULAR REEDUCATION: CPT | Performed by: PHYSICAL THERAPIST

## 2024-06-17 NOTE — PROGRESS NOTES
Daily Note     Today's date: 2024  Patient name: Sahara Patel  : 1950  MRN: [id number]  Referring provider: Cathryn Lopez MD  Dx:   Encounter Diagnosis     ICD-10-CM    1. Unsteady gait when walking  R26.81       2. Imbalance  R26.89                      Subjective: Sahara Patel report she went shopping at [employer] today with cart and her daughter consistently had to correct her to take bigger steps.        Objective: See treatment diary below      Assessment: Tolerated treatment well. Patient with continues poor right sided stride length and foot clearance but she demod improved LLE mechanics vs last session.  Pt reported significant fatigue throughout session today with need for seated rest breaks and also deferred last 10 minutes of treatment.      Plan: Continue per plan of care.      Precautions: Imbalance/Falls Risk    Access Code: [email or web address]  URL: [email or web address]  Date: 2024  Prepared by: Devon Robles    Exercises  - Supine Bridge  - 1 x daily - 7 x weekly - 2 sets - 10 reps  - Supine Lower Trunk Rotation  - 1 x daily - 7 x weekly - 2 sets - 10 reps  - Walking with Head Rotation  - 1 x daily - 7 x weekly - 3 sets - 10 reps  - Tandem Walking with Counter Support  - 1 x daily - 7 x weekly - 3 sets - 10 reps  - Sit to Stand with Hands on Knees  - 3 x weekly - 2 sets - 10 reps  - On/Off horse  - 3 x weekly - 2 sets - 10 reps  - Step Up  - 3 x weekly - 3 sets - 10 reps     Insurance:  AMA/CMS Eval/ Re-eval Auth #/ Referral # Total units  Start date  Expiration date Extension  Visit limitation?  PT only or  PT+OT? Co-Insurance   CMS 24 NA       100% after deduct    24 NA           3/21/24 NA           24             POC Start Date POC Expiration Date Signed POC?   1/22/24 3/18/24 Pend   2024 Pending    3/21/2024 2024 yes   2024 Yes      Date               Visits/Units:  Used               Authed:   Remaining                     Manuals 6/17 6/13 6/3 5/30 5/21    32 31 30 29 28                           Neuro Re-Ed        Side step at rail   [id number]  3x20ft +  2x30ft   STS 10x on airex Butt taps on chair + airex 10x Butt taps of elevated high low table  10x  10x from chair w/2airex   SLS cone tap (march alternate)  2x10 B 1HR 1 HR 2x10 B     Side step over cones   2x10 B at rail     SLS on/off horse   Seated abd stab 3x5 B     romberg w/EC   3x30s w/ close supervision - evidence of imbalance      Turn in place  180 degrees   10x B by railing and verbal cues to improve foot clearance        STS into walk around gym then return to chair 5x40ft w/continued need for verbal cues for right step length. [id number] w/ continued verbal cues for STS mechanics and foot clearance during ambulation      SLS hip abd 2x10 B       Quad sets     10x5s   SLR w/abd stab     2x10   Bridge     2x10   SLS toe off into heel contact on oppo 2x10 B       Walking with VOR        Bridges         Ther Ex        Education    Updated POC    Heel slides     2x10   HEP        DGI    performed    5xSTS    performed    mCTSIB    performed    6MWT    performed    2MWT    performed    Seated HS str   3x30s B     Seated march 20x B       Stand ham curls     2x10

## 2024-06-24 ENCOUNTER — OFFICE VISIT (OUTPATIENT)
Dept: PHYSICAL THERAPY | Facility: CLINIC | Age: 74
End: 2024-06-24
Payer: MEDICARE

## 2024-06-24 DIAGNOSIS — R26.81 UNSTEADY GAIT WHEN WALKING: Primary | ICD-10-CM

## 2024-06-24 DIAGNOSIS — R26.89 IMBALANCE: ICD-10-CM

## 2024-06-24 PROCEDURE — 97112 NEUROMUSCULAR REEDUCATION: CPT | Performed by: PHYSICAL THERAPIST

## 2024-06-24 PROCEDURE — 97110 THERAPEUTIC EXERCISES: CPT | Performed by: PHYSICAL THERAPIST

## 2024-06-24 NOTE — PROGRESS NOTES
"Daily Note     Today's date: 2024  Patient name: Sahara Patel  : 1950  MRN: [id number]  Referring provider: Cathryn Lopez MD  Dx:   Encounter Diagnosis     ICD-10-CM    1. Unsteady gait when walking  R26.81       2. Imbalance  R26.89                      Subjective: Sahara Patel reports yesterday she felt fatigued and her legs felt heavy all day which she states impacted her activity levels.        Objective: See treatment diary below      Assessment: Tolerated treatment well. Patient with increased shuffling of feet again today with ambulation.  Beginning of session focus on lumbar spine flexion based movements with no significant effect on ambulation or LE \"tiredness\" feeling, if anything she demod more imbalance.        Plan: Continue per plan of care.      Precautions: Imbalance/Falls Risk    Access Code: [email or web address]  URL: [email or web address]  Date: 2024  Prepared by: Devon Robles    Exercises  - Supine Bridge  - 1 x daily - 7 x weekly - 2 sets - 10 reps  - Supine Lower Trunk Rotation  - 1 x daily - 7 x weekly - 2 sets - 10 reps  - Walking with Head Rotation  - 1 x daily - 7 x weekly - 3 sets - 10 reps  - Tandem Walking with Counter Support  - 1 x daily - 7 x weekly - 3 sets - 10 reps  - Sit to Stand with Hands on Knees  - 3 x weekly - 2 sets - 10 reps  - On/Off horse  - 3 x weekly - 2 sets - 10 reps  - Step Up  - 3 x weekly - 3 sets - 10 reps     Insurance:  AMA/CMS Eval/ Re-eval Auth #/ Referral # Total units  Start date  Expiration date Extension  Visit limitation?  PT only or  PT+OT? Co-Insurance   CMS 24 NA       100% after deduct    24 NA           3/21/24 NA           24             POC Start Date POC Expiration Date Signed POC?   1/22/24 3/18/24 Pend   2024 Pending    3/21/2024 2024 yes   2024 Yes      Date               Visits/Units:  Used               Authed:  Remaining                 "     Manuals 6/24 6/17 6/13 6/3 5/30    33 32 31 30 29                           Neuro Re-Ed        Side step at rail    [id number]    STS  10x on airex Butt taps on chair + airex 10x Butt taps of elevated high low table  10x    SLS cone tap (march alternate) 2x10 B 1HR  2x10 B 1HR 1 HR 2x10 B    Side step over cones    2x10 B at rail    SLS on/off horse    Seated abd stab 3x5 B    Romberg EO - CS flex/ext +  rot 2x10 ea by railing and close supervision.       romberg w/EC    3x30s w/ close supervision - evidence of imbalance     Turn in place   180 degrees   10x B by railing and verbal cues to improve foot clearance       STS into walk around gym then return to chair Walk around chair then around room - cues to reduce shuffling in small spaces -   5x60ft 5x40ft w/continued need for verbal cues for right step length. [id number] w/ continued verbal cues for STS mechanics and foot clearance during ambulation     SLS hip abd  2x10 B      Quad sets        SLR w/abd stab        Bridge        SLS toe off into heel contact on oppo  2x10 B      Walking with VOR Head turns horizontal -   5x60ft close supervision       Bridges         Ther Ex        Education     Updated POC   HEP        DGI     performed   5xSTS     performed   mCTSIB     performed   6MWT     performed   2MWT     performed   Seated HS str    3x30s B    Seated march  20x B      Sup piriformis str 3x30s B       Hook LS rot 2x10       Flexion in sitting L/M/R  10x ea

## 2024-07-05 ENCOUNTER — EVALUATION (OUTPATIENT)
Dept: PHYSICAL THERAPY | Facility: CLINIC | Age: 74
End: 2024-07-05
Payer: MEDICARE

## 2024-07-05 DIAGNOSIS — R26.81 UNSTEADY GAIT WHEN WALKING: Primary | ICD-10-CM

## 2024-07-05 DIAGNOSIS — R26.89 IMBALANCE: ICD-10-CM

## 2024-07-05 PROCEDURE — 97110 THERAPEUTIC EXERCISES: CPT | Performed by: PHYSICAL THERAPIST

## 2024-07-05 NOTE — PROGRESS NOTES
Re-Evaluation     Today's date: 2024  Patient name: Sahara Patel  : 1950  MRN: [id number]  Referring provider: Cathryn Lopez MD  Dx:   Encounter Diagnosis     ICD-10-CM    1. Unsteady gait when walking  R26.81       2. Imbalance  R26.89                      Assessment  Current assessment details: Sahara Patel presents for re-evaluation at [facility] for her impaired gait and imbalance.  Since her last evaluation she has continued to show slight overall regression and evidence of increasing imbalance.  Most obvious change has been in her quality of gait with a significant increase in frequency of and severity of shuffling of her feet.  This can occur at varying times but is most evident following prolonged sitting, negotiating in small spaces and when dual tasking or losing focus.  Her regression can also be seen with a 1 point decrease in her Dynamic Gait Index, decrease in both 2 and 6 minute walk test distances.  A Mini-BestTest was completed today to further evaluate her level of imbalance and she scored a 15/28 with lowest scoring subsets in the dynamic gait and anticipatory sections.  She does not show any significant decrease in LE MMT however.  She is however able to significantly improve her quality of gait when given verbal cues to stay focused on task and to improve clearance of foot.  She may benefit from consult with Neurologist to further investigate her trend of increasing imbalance.  She will also continue to benefit from skilled therapy to continually address her above deficits to decrease her overall risk of falls, lack of endurance, and improve her functional mobility.      Impairments: abnormal gait, abnormal or restricted ROM, abnormal movement, activity intolerance, impaired balance, impaired physical strength, lacks appropriate home exercise program, poor posture  and poor body mechanics  Understanding of Dx/Px/POC: good   Prognosis: good    Goals  STG 2-4 weeks:  1. Improve  5xSTS score by >4 seconds indicating improve ability to transfer STS. - Regressed  2. Pt will demonstrate increase DGI score by 1-2 points as evidence of decreased imbalance. - Regressed  3. Pt will be I with basic HEP. met    LTG 4-8 weeks:  1. Pt will improve 6 minute walk test by > 250ft as evidence of improved functional mobility - Not Met   2. Pt will increase her ABC scale score to >75% - Not Met   3. Pt will be I with comprehensive HEP. In progress  4. Improve hip strength by 1/2 grade in all deficient planes. Met  5. Pt will demo >21/24 on dynamic gait index. - Not met      Plan  Plan details: HEP development, stretching, strengthening, A/AA/PROM, joint mobilizations, posture education, STM/MI as needed to reduce muscle tension, balance and proprioceptive training, muscle reeducation, PLOC discussed and agreed upon with patient.    Patient would benefit from: PT eval and skilled physical therapy  Planned modality interventions: cryotherapy and thermotherapy: hydrocollator packs  Planned therapy interventions: manual therapy, neuromuscular re-education, self care, therapeutic activities, therapeutic exercise and home exercise program  Frequency: 1-2x week  Duration in weeks: 8  Treatment plan discussed with: patient      Subjective Evaluation    History of Present Illness  Current Subjective:  Sahara Patel reports she feels her balance has continued to be worse and she feels more unsteady throughout the day.  She also has had a lot of stress in her life that is contributing to her balance issues.  She reports unsteady when going up a flight of stairs out in the community (vs in the home).    Patient Goals  Patient goals for therapy: improved balance, increased strength and independence with ADLs/IADLs    Social Support  Steps to enter house: yes  4  Stairs in house: yes   4  Lives in: multiple-level home  Lives with: significant other    Employment status: not working  Exercise history: Charan Reed  1-2x/wk        Objective     Postural Observations  Seated posture: fair  Standing posture: fair      Neurological Testing     Sensation     Lumbar   Left   Diminished: light touch    Right   Diminished: light touch    Additional Neurological Details  Evidence of 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     Strength/Myotome Testing     Left Hip   Planes of Motion   Flexion: 4+   External rotation: 4+  Internal rotation: 4+    Right Hip   Planes of Motion   Flexion: 4+  External rotation: 4+  Internal rotation: 4+    Left Knee   Flexion: 5  Extension: 5    Right Knee   Flexion: 5  Extension: 5    Left Ankle/Foot   Dorsiflexion: 5  Plantar flexion: 5    Right Ankle/Foot   Dorsiflexion: 5  Plantar flexion: 5    Ambulation     Ambulation: Stairs   Ascend stairs: independent  Pattern: reciprocal  Railings: one rail  Descend stairs: independent  Pattern: reciprocal  Railings: one rail    Additional Stairs Ambulation Details  Requires use of railing to carry objects up and down the stairs     Observational Gait     Additional Observational Gait Details  Intermittent lateral sway, shuffling of feet L>R due to reduced foot clearance  Increased external rotation in L LE through gait cycle   Shortened step length     Functional Assessment:  Patient with intermittent significant shuffling of feet, more evident when dual tasking or unable to focus on her walking.  Also seen to be severe with moving in small areas such as preparing to perform stand to sit transfer with evidence of imbalance and increased risk of falls.  This also occurs while initiating walking following more prolonged periods of sitting.        DGI  MDC: vestibular - 4 pts  MDC: geriatric/community - 3 pts  Falls risk <19/24 5xSTS: Lucila et al 2010  MDC: 2.3 sec  Age Norms:  60-69: 11.1 sec  70-79: 12.6 sec  80-89: 14.8 sec   6 Minute Walk Test  Age Norms  60-69: M - 1876 ft (571.80 m)  F - 1765 ft (537.98 m)  70-79: M - 1729 ft (527.00 m)  F - 1545 ft (470.92  m)  80-89: M - 1368 ft (416.97 m)  F - 1286 ft (391.97 m) mCTSIB  Norm: 20-60 yrs  Eyes open firm: norm sway 0.21-0.48  Eyes closed firm: norm sway 0.48-0.99  Eyes open foam: norm sway 0.38-0.71  Eyes closed foam: norm sway 0.70-2.22       Outcome Measures Initial Eval   2/21/2024  3/21/2024 4/22/2024  5/30/24  7/5/24   5xSTS 22.06s 17.00 1LOB 15.44s  Trial 1: 23.43s  Trial 2: 19.09s    UE assist  22.36s no UE support  20.97s no UE assist   mCTSIB  - FTEO (firm)  - FTEC (firm)  - FTEO (foam)  - FTEC (foam) 0.94  1.28  1.38  2.75 w/ 1 grab of HR  Composite:1.58 0.61  1.27  1.50  2.77 w/ 1 grab of HR    Composite: 1.53 0.91  1.23  1.42  3.06 w/ 1 grab of HR      Composite:  1.65 0.96   1.16   1.27   3.34     Composite: 1.68   1. 1.00  2. 1.33  3.  1.26  4. 3.39 >5 touches of hand rail  Composite:  1.74     6MWT 1100ft 1275ft 1250ft 1LOB while turning  1300ft   1250ft  1190   2MWT 375ft 440ft 400ft 450ft   410ft  375ft   DGI 19/24 21/24 21/24 21/24 18/24 17/24    ABC  70.6%  57.5%           Mini-BesTest      15/28          Precautions: Imbalance/Falls Risk    Access Code: [email or web address]       Insurance:  AMA/CMS Eval/ Re-eval Auth #/ Referral # Total units  Start date  Expiration date Extension  Visit limitation?  PT only or  PT+OT? Co-Insurance   CMS 1/22/24 NA       100% after deduct    2/21/24 NA           3/21/24 NA           4/22/24 5/30/24 7/5/24             POC Start Date POC Expiration Date Signed POC?   1/22/24 3/18/24 Pend   2/21/2024 4/17/2024 Pending    3/21/2024 5/16/2024 yes   4/22/2024 6/17/2024 Yes   5/30/2024 7/25/24 Yes   7/5/24 8/30/2024 Pend      Date               Visits/Units:  Used               Authed:  Remaining                     Manuals 7/5 6/24 6/17 6/13 6/3    34 33 32 31 30                           Neuro Re-Ed        Side step at rail     [id number]   STS   10x on airex Butt taps on chair + airex 10x Butt taps of elevated high low table  10x   SLS cone tap (march alternate)   2x10 B 1HR  2x10 B 1HR 1 HR 2x10 B   Side step over cones     2x10 B at rail   SLS on/off horse     Seated abd stab 3x5 B   Romberg EO - CS flex/ext +  rot  2x10 ea by railing and close supervision.      romberg w/EC     3x30s w/ close supervision - evidence of imbalance    Turn in place    180 degrees   10x B by railing and verbal cues to improve foot clearance      STS into walk around gym then return to chair  Walk around chair then around room - cues to reduce shuffling in small spaces -   5x60ft 5x40ft w/continued need for verbal cues for right step length. [id number] w/ continued verbal cues for STS mechanics and foot clearance during ambulation    SLS hip abd   2x10 B     Quad sets        SLR w/abd stab        Bridge        SLS toe off into heel contact on oppo   2x10 B     Walking with VOR  Head turns horizontal -   5x60ft close supervision      Bridges         Ther Ex        Education        HEP        DGI performed       5xSTS performed       Mini best performed       6MWT performed       2MWT performed       Seated HS str     3x30s B   Seated march   20x B     Sup piriformis str  3x30s B      Hook LS rot  2x10      Flexion in sitting  L/M/R  10x ea

## 2024-07-05 NOTE — LETTER
2024    Cathryn Lopez MD  [facility]    Patient: Sahara Patel   YOB: 1950   Date of Visit: 2024     Encounter Diagnosis     ICD-10-CM    1. Unsteady gait when walking  R26.81       2. Imbalance  R26.89           Dear Dr. Lopez:    Thank you for your recent referral of Sahara Patel. Please review the attached evaluation summary from Sahara's recent visit.     Please verify that you agree with the plan of care by signing the attached order.     If you have any questions or concerns, please do not hesitate to call.     I sincerely appreciate the opportunity to share in the care of one of your patients and hope to have another opportunity to work with you in the near future.       Sincerely,    Devon Robles, PT      Referring Provider:      I certify that I have read the below Plan of Care and certify the need for these services furnished under this plan of treatment while under my care.                    Cathryn Lopez MD  [facility]  Via Fax: [phone]          Re-Evaluation     Today's date: 2024  Patient name: Sahara Patel  : 1950  MRN: [id number]  Referring provider: Cathryn Lopez MD  Dx:   Encounter Diagnosis     ICD-10-CM    1. Unsteady gait when walking  R26.81       2. Imbalance  R26.89                      Assessment  Current assessment details: Sahara Patel presents for re-evaluation at [facility] for her impaired gait and imbalance.  Since her last evaluation she has continued to show slight overall regression and evidence of increasing imbalance.  Most obvious change has been in her quality of gait with a significant increase in frequency of and severity of shuffling of her feet.  This can occur at varying times but is most evident following prolonged sitting, negotiating in small spaces and when dual tasking or losing focus.  Her regression can also be seen with a 1 point decrease in her  Dynamic Gait Index, decrease in both 2 and 6 minute walk test distances.  A Mini-BestTest was completed today to further evaluate her level of imbalance and she scored a 15/28 with lowest scoring subsets in the dynamic gait and anticipatory sections.  She does not show any significant decrease in LE MMT however.  She is however able to significantly improve her quality of gait when given verbal cues to stay focused on task and to improve clearance of foot.  She may benefit from consult with Neurologist to further investigate her trend of increasing imbalance.  She will also continue to benefit from skilled therapy to continually address her above deficits to decrease her overall risk of falls, lack of endurance, and improve her functional mobility.      Impairments: abnormal gait, abnormal or restricted ROM, abnormal movement, activity intolerance, impaired balance, impaired physical strength, lacks appropriate home exercise program, poor posture  and poor body mechanics  Understanding of Dx/Px/POC: good   Prognosis: good    Goals  STG 2-4 weeks:  1. Improve 5xSTS score by >4 seconds indicating improve ability to transfer STS. - Regressed  2. Pt will demonstrate increase DGI score by 1-2 points as evidence of decreased imbalance. - Regressed  3. Pt will be I with basic HEP. met    LTG 4-8 weeks:  1. Pt will improve 6 minute walk test by > 250ft as evidence of improved functional mobility - Not Met   2. Pt will increase her ABC scale score to >75% - Not Met   3. Pt will be I with comprehensive HEP. In progress  4. Improve hip strength by 1/2 grade in all deficient planes. Met  5. Pt will demo >21/24 on dynamic gait index. - Not met      Plan  Plan details: HEP development, stretching, strengthening, A/AA/PROM, joint mobilizations, posture education, STM/MI as needed to reduce muscle tension, balance and proprioceptive training, muscle reeducation, PLOC discussed and agreed upon with patient.    Patient would benefit  from: PT eval and skilled physical therapy  Planned modality interventions: cryotherapy and thermotherapy: hydrocollator packs  Planned therapy interventions: manual therapy, neuromuscular re-education, self care, therapeutic activities, therapeutic exercise and home exercise program  Frequency: 1-2x week  Duration in weeks: 8  Treatment plan discussed with: patient      Subjective Evaluation    History of Present Illness  Current Subjective:  Sahara Patel reports she feels her balance has continued to be worse and she feels more unsteady throughout the day.  She also has had a lot of stress in her life that is contributing to her balance issues.  She reports unsteady when going up a flight of stairs out in the community (vs in the home).    Patient Goals  Patient goals for therapy: improved balance, increased strength and independence with ADLs/IADLs    Social Support  Steps to enter house: yes  4  Stairs in house: yes   4  Lives in: multiple-level home  Lives with: significant other    Employment status: not working  Exercise history: Pilates - 1-2x/wk        Objective     Postural Observations  Seated posture: fair  Standing posture: fair      Neurological Testing     Sensation     Lumbar   Left   Diminished: light touch    Right   Diminished: light touch    Additional Neurological Details  Evidence of 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     Strength/Myotome Testing     Left Hip   Planes of Motion   Flexion: 4+   External rotation: 4+  Internal rotation: 4+    Right Hip   Planes of Motion   Flexion: 4+  External rotation: 4+  Internal rotation: 4+    Left Knee   Flexion: 5  Extension: 5    Right Knee   Flexion: 5  Extension: 5    Left Ankle/Foot   Dorsiflexion: 5  Plantar flexion: 5    Right Ankle/Foot   Dorsiflexion: 5  Plantar flexion: 5    Ambulation     Ambulation: Stairs   Ascend stairs: independent  Pattern: reciprocal  Railings: one rail  Descend stairs: independent  Pattern: reciprocal  Railings:  one rail    Additional Stairs Ambulation Details  Requires use of railing to carry objects up and down the stairs     Observational Gait     Additional Observational Gait Details  Intermittent lateral sway, shuffling of feet L>R due to reduced foot clearance  Increased external rotation in L LE through gait cycle   Shortened step length     Functional Assessment:  Patient with intermittent significant shuffling of feet, more evident when dual tasking or unable to focus on her walking.  Also seen to be severe with moving in small areas such as preparing to perform stand to sit transfer with evidence of imbalance and increased risk of falls.  This also occurs while initiating walking following more prolonged periods of sitting.        DGI  MDC: vestibular - 4 pts  MDC: geriatric/community - 3 pts  Falls risk <19/24 5xSTS: Lucila et al 2010  MDC: 2.3 sec  Age Norms:  60-69: 11.1 sec  70-79: 12.6 sec  80-89: 14.8 sec   6 Minute Walk Test  Age Norms  60-69: M - 1876 ft (571.80 m)  F - 1765 ft (537.98 m)  70-79: M - 1729 ft (527.00 m)  F - 1545 ft (470.92 m)  80-89: M - 1368 ft (416.97 m)  F - 1286 ft (391.97 m) mCTSIB  Norm: 20-60 yrs  Eyes open firm: norm sway 0.21-0.48  Eyes closed firm: norm sway 0.48-0.99  Eyes open foam: norm sway 0.38-0.71  Eyes closed foam: norm sway 0.70-2.22       Outcome Measures Initial Eval   2/21/2024  3/21/2024 4/22/2024  5/30/24  7/5/24   5xSTS 22.06s 17.00 1LOB 15.44s  Trial 1: 23.43s  Trial 2: 19.09s    UE assist  22.36s no UE support  20.97s no UE assist   mCTSIB  - FTEO (firm)  - FTEC (firm)  - FTEO (foam)  - FTEC (foam) 0.94  1.28  1.38  2.75 w/ 1 grab of HR  Composite:1.58 0.61  1.27  1.50  2.77 w/ 1 grab of HR    Composite: 1.53 0.91  1.23  1.42  3.06 w/ 1 grab of HR      Composite:  1.65 0.96   1.16   1.27   3.34     Composite: 1.68   1. 1.00  2. 1.33  3.  1.26  4. 3.39 >5 touches of hand rail  Composite:  1.74     6MWT 1100ft 1275ft 1250ft 1LOB while turning  1300ft   1250ft  1190    2MWT 375ft 440ft 400ft 450ft   410ft  375ft   DGI 19/24 21/24 21/24 21/24 18/24 17/24    ABC  70.6%  57.5%           Mini-BesTest      15/28          Precautions: Imbalance/Falls Risk    Access Code: [email or web address]       Insurance:  AMA/CMS Eval/ Re-eval Auth #/ Referral # Total units  Start date  Expiration date Extension  Visit limitation?  PT only or  PT+OT? Co-Insurance   CMS 1/22/24 NA       100% after deduct    2/21/24 NA           3/21/24 NA           4/22/24 5/30/24 7/5/24             POC Start Date POC Expiration Date Signed POC?   1/22/24 3/18/24 Pend   2/21/2024 4/17/2024 Pending    3/21/2024 5/16/2024 yes   4/22/2024 6/17/2024 Yes   5/30/2024 7/25/24 Yes   7/5/24 8/30/2024 Pend      Date               Visits/Units:  Used               Authed:  Remaining                     Manuals 7/5 6/24 6/17 6/13 6/3    34 33 32 31 30                           Neuro Re-Ed        Side step at rail     [id number]   STS   10x on airex Butt taps on chair + airex 10x Butt taps of elevated high low table  10x   SLS cone tap (march alternate)  2x10 B 1HR  2x10 B 1HR 1 HR 2x10 B   Side step over cones     2x10 B at rail   SLS on/off horse     Seated abd stab 3x5 B   Romberg EO - CS flex/ext +  rot  2x10 ea by railing and close supervision.      romberg w/EC     3x30s w/ close supervision - evidence of imbalance    Turn in place    180 degrees   10x B by railing and verbal cues to improve foot clearance      STS into walk around gym then return to chair  Walk around chair then around room - cues to reduce shuffling in small spaces -   5x60ft 5x40ft w/continued need for verbal cues for right step length. [id number] w/ continued verbal cues for STS mechanics and foot clearance during ambulation    SLS hip abd   2x10 B     Quad sets        SLR w/abd stab        Bridge        SLS toe off into heel contact on oppo   2x10 B     Walking with VOR  Head turns horizontal -   5x60ft close supervision      Bridges          Ther Ex        Education        HEP        DGI performed       5xSTS performed       Mini best performed       6MWT performed       2MWT performed       Seated HS str     3x30s B   Seated march   20x B     Sup piriformis str  3x30s B      Hook LS rot  2x10      Flexion in sitting  L/M/R  10x ea

## 2024-07-08 ENCOUNTER — OFFICE VISIT (OUTPATIENT)
Dept: PHYSICAL THERAPY | Facility: CLINIC | Age: 74
End: 2024-07-08
Payer: MEDICARE

## 2024-07-08 DIAGNOSIS — R26.89 IMBALANCE: ICD-10-CM

## 2024-07-08 DIAGNOSIS — R26.81 UNSTEADY GAIT WHEN WALKING: Primary | ICD-10-CM

## 2024-07-08 PROCEDURE — 97112 NEUROMUSCULAR REEDUCATION: CPT | Performed by: PHYSICAL THERAPIST

## 2024-07-08 NOTE — PROGRESS NOTES
Daily Note     Today's date: 2024  Patient name: Sahara Patel  : 1950  MRN: [id number]  Referring provider: Cathryn Lopez MD  Dx:   Encounter Diagnosis     ICD-10-CM    1. Unsteady gait when walking  R26.81       2. Imbalance  R26.89                      Subjective: Sahara Patel reports she noticed more this week how her balance is way worse when she has to multitask.  She does feel she can improve her walking when she focuses but continues to lose her balance when distracted.      Objective: See treatment diary below      Assessment: Tolerated treatment well. Patient lucia continued significant imbalance and difficulty completing cognitive challenge during her balance tasks.  She will frequently stop balance activity to be able to complete cognitive task vs being able to complete both simultaneously.        Plan: Continue per plan of care.      Precautions: Imbalance/Falls Risk    Access Code: [email or web address]       Insurance:  A/CMS Eval/ Re-eval Auth #/ Referral # Total units  Start date  Expiration date Extension  Visit limitation?  PT only or  PT+OT? Co-Insurance   CMS 24 NA       100% after deduct    24 NA           3/21/24 NA           24             POC Start Date POC Expiration Date Signed POC?   1/22/24 3/18/24 Pend   2024 Pending    3/21/2024 2024 yes   2024 Yes   2024 Yes   24 Pend      Date               Visits/Units:  Used               Authed:  Remaining                     Manuals     35 34 33 32 31                           Neuro Re-Ed        Side step while playing catch with ball from staff member PT providing CS throughout  2x10 20ft B       STS 10x   10x on airex Butt taps on chair + airex 10x   SLS cone tap () SLS at table   2x10 5s  2x10 B 1HR  2x10 B 1HR   Static Balance while playing catch and naming animals alphabetical  Romberg - 4 min  1/2 romberg - 3 min B  3/4 romberg - 3 min B  CS by PT throughout       Romberg EO - CS flex/ext +  rot   2x10 ea by railing and close supervision.     romberg w/EC      3x30s w/ close supervision - evidence of imbalance   Turn in place     180 degrees   10x B by railing and verbal cues to improve foot clearance     STS into walk around gym then return to chair   Walk around chair then around room - cues to reduce shuffling in small spaces -   5x60ft 5x40ft w/continued need for verbal cues for right step length. [id number] w/ continued verbal cues for STS mechanics and foot clearance during ambulation   SLS hip abd    2x10 B    Quad sets        SLR w/abd stab        Bridge        SLS toe off into heel contact on oppo    2x10 B    Walking with VOR   Head turns horizontal -   5x60ft close supervision     Bridges         Ther Ex        Education        HEP        DGI  performed      5xSTS  performed      Mini best  performed      6MWT  performed      2MWT  performed      Seated HS str        Seated march    20x B    Sup piriformis str   3x30s B     Hook LS rot   2x10     Flexion in sitting   L/M/R  10x ea

## 2024-07-15 ENCOUNTER — OFFICE VISIT (OUTPATIENT)
Dept: PHYSICAL THERAPY | Facility: CLINIC | Age: 74
End: 2024-07-15
Payer: MEDICARE

## 2024-07-15 ENCOUNTER — APPOINTMENT (OUTPATIENT)
Dept: PHYSICAL THERAPY | Facility: CLINIC | Age: 74
End: 2024-07-15
Payer: MEDICARE

## 2024-07-15 DIAGNOSIS — R26.89 IMBALANCE: ICD-10-CM

## 2024-07-15 DIAGNOSIS — R26.81 UNSTEADY GAIT WHEN WALKING: Primary | ICD-10-CM

## 2024-07-15 PROCEDURE — 97112 NEUROMUSCULAR REEDUCATION: CPT

## 2024-07-15 NOTE — PROGRESS NOTES
Daily Note     Today's date: 7/15/2024  Patient name: Sahara Patel  : 1950  MRN: [id number]  Referring provider: Cathryn Lopez MD  Dx:   Encounter Diagnosis     ICD-10-CM    1. Unsteady gait when walking  R26.81       2. Imbalance  R26.89           Start Time: 1015  Stop Time: 1100  Total time in clinic (min): 45 minutes    Subjective: Sahara reports feeling like she is able to do all the activities she needs to do around her house however has to take it very slow due to feeling off balance and unsteady.       Objective: See treatment diary below      Assessment: Tolerated treatment well. Pt continues to demonstrate significant imbalance especially when with added cognitive challenge during balance activities. Pt demonstrated difficulty performing dynamic balance activity and cognitive tasks simultaneously and frequently paused activity due to increased challenge . Patient demonstrated fatigue post treatment and would benefit from continued PT      Plan: Continue per plan of care.  Progress treatment as tolerated.       Precautions: Imbalance/Falls Risk    Access Code: [email or web address]       Insurance:  A/CMS Eval/ Re-eval Auth #/ Referral # Total units  Start date  Expiration date Extension  Visit limitation?  PT only or  PT+OT? Co-Insurance   CMS 24 NA       100% after deduct    24 NA           3/21/24 NA           24             POC Start Date POC Expiration Date Signed POC?   1/22/24 3/18/24 Pend   2024 Pending    3/21/2024 2024 yes   2024 Yes   2024 Yes   24 Pend      Date               Visits/Units:  Used               Authed:  Remaining                     Manuals 7/15 7/8 7/5 6/24 6/17    36 35 34 33 32                           Neuro Re-Ed        Side step while playing catch with ball from staff member PT providing CS throughout 2x10 20ft  PT providing CS throughout  2x10 20ft B       STS 2x10  10x   10x on airex   SLS cone tap (march alternate) SLS at table w/ multidirectional cone taps 2x10  SLS at table   2x10 5s  2x10 B 1HR    Static Balance while playing catch and naming animals alphabetical  Romberg - 4 min  1/2 romberg - 3 min B  3/4 romberg - 3 min B  CS by PT throughout      Romberg EO - CS flex/ext +  rot    2x10 ea by railing and close supervision.    romberg w/EC         Turn in place        STS into walk around gym then return to chair    Walk around chair then around room - cues to reduce shuffling in small spaces -   5x60ft 5x40ft w/continued need for verbal cues for right step length.   Hurdles  Fwd w/ hand held assist from PT 2x10 to emphasize foot clearance and step length       SLS hip abd     2x10 B   Quad sets        SLR w/abd stab        Bridge        SLS toe off into heel contact on oppo     2x10 B   Walking with VOR    Head turns horizontal -   5x60ft close supervision    Bridges         Ther Ex        Education        HEP        DGI   performed     5xSTS   performed     Mini best   performed     6MWT   performed     2MWT   performed     Seated HS str        Seated march     20x B   Sup piriformis str    3x30s B    Hook LS rot    2x10    Flexion in sitting    L/M/R  10x ea

## 2024-07-18 ENCOUNTER — OFFICE VISIT (OUTPATIENT)
Dept: PHYSICAL THERAPY | Facility: CLINIC | Age: 74
End: 2024-07-18
Payer: MEDICARE

## 2024-07-18 DIAGNOSIS — R26.81 UNSTEADY GAIT WHEN WALKING: Primary | ICD-10-CM

## 2024-07-18 DIAGNOSIS — R26.89 IMBALANCE: ICD-10-CM

## 2024-07-18 PROCEDURE — 97110 THERAPEUTIC EXERCISES: CPT | Performed by: PHYSICAL THERAPIST

## 2024-07-18 PROCEDURE — 97112 NEUROMUSCULAR REEDUCATION: CPT | Performed by: PHYSICAL THERAPIST

## 2024-07-18 NOTE — PROGRESS NOTES
Daily Note     Today's date: 2024  Patient name: Sahara Patel  : 1950  MRN: [id number]  Referring provider: Cathryn Lopez MD  Dx:   Encounter Diagnosis     ICD-10-CM    1. Unsteady gait when walking  R26.81       2. Imbalance  R26.89           Start Time: 0940  Stop Time: 1020  Total time in clinic (min): 40 minutes    Subjective: Sahara reports feeling off balance and tends to rely on UE on her  for support. She is worried about his current medical issues and confirms its affecting her ability to focus on her own exercises.      Objective: See treatment diary below      Assessment: Tolerated treatment well. Continued to challenge balance with various static and dynamic balance activities with multi-step activities. She continues to be fearful with narrow stance tasks. Provided client with information regarding handicapp placard application per client request. She demonstrated fatigue post treatment and would benefit from continued PT      Plan: Continue per plan of care.  Progress treatment as tolerated.       Precautions: Imbalance/Falls Risk    Access Code: [email or web address]       Insurance:  AMA/CMS Eval/ Re-eval Auth #/ Referral # Total units  Start date  Expiration date Extension  Visit limitation?  PT only or  PT+OT? Co-Insurance   CMS 24 NA       100% after deduct    24 NA           3/21/24 NA           24             POC Start Date POC Expiration Date Signed POC?   1/22/24 3/18/24 Pend   2024 Pending    3/21/2024 2024 yes   2024 Yes   2024 Yes   24 Pend      Date               Visits/Units:  Used               Authed:  Remaining                     Manuals 7/18 7/15 7/8 7/5 6/24 6/17    37 36 35 34 33 32                              Neuro Re-Ed         Side step while playing catch with ball from staff member  PT providing CS throughout 2x10 20ft  PT providing CS  throughout  2x10 20ft B      STS  2x10  10x   10x on airex   SLS cone tap (march alternate)  SLS at table w/ multidirectional cone taps 2x10  SLS at table   2x10 5s  2x10 B 1HR    Static Balance while playing catch and naming animals alphabetical   Romberg - 4 min  1/2 romberg - 3 min B  3/4 romberg - 3 min B  CS by PT throughout      Romberg EO - CS flex/ext +  rot     2x10 ea by railing and close supervision.    romberg w/EC          Turn in place         STS into walk around gym then return to chair     Walk around chair then around room - cues to reduce shuffling in small spaces -   5x60ft 5x40ft w/continued need for verbal cues for right step length.   Hurdles   Fwd w/ hand held assist from PT 2x10 to emphasize foot clearance and step length       SLS hip abd      2x10 B   Tandem balance Semi-tandem on airex pad with multidirectional balltoss x 12 min        Standing balance Feet apart on 2 airex beams with rotational throw/catch activity x 12        Bridge         SLS toe off into heel contact on oppo      2x10 B   Walking with VOR     Head turns horizontal -   5x60ft close supervision    Bridges          Ther Ex         Education Provided information regarding handicapp placard application. Also discussed benefits on following up with neurologist due to fall history and worsening gait/memory        HEP         DGI    performed     5xSTS    performed     Mini best    performed     6MWT    performed     2MWT    performed     Seated HS str         Seated march      20x B   Sup piriformis str     3x30s B    Hook LS rot     2x10    Flexion in sitting     L/M/R  10x ea

## 2024-07-22 ENCOUNTER — OFFICE VISIT (OUTPATIENT)
Dept: PHYSICAL THERAPY | Facility: CLINIC | Age: 74
End: 2024-07-22
Payer: MEDICARE

## 2024-07-22 DIAGNOSIS — R26.81 UNSTEADY GAIT WHEN WALKING: Primary | ICD-10-CM

## 2024-07-22 DIAGNOSIS — R26.89 IMBALANCE: ICD-10-CM

## 2024-07-22 PROCEDURE — 97112 NEUROMUSCULAR REEDUCATION: CPT

## 2024-07-22 NOTE — PROGRESS NOTES
Daily Note     Today's date: 2024  Patient name: Sahara Patel  : 1950  MRN: [id number]  Referring provider: Cathryn Lopez MD  Dx:   Encounter Diagnosis     ICD-10-CM    1. Unsteady gait when walking  R26.81       2. Imbalance  R26.89           Start Time: 1232  Stop Time: 1315  Total time in clinic (min): 43 minutes    Subjective: Sahara reports she is feeling fatigued today and has been doing a lot more activity around her house, but feels like she needs to move very slow. Pt reports she has been trying to do more exercises at home and is looking into returning to [facility].      Objective: See treatment diary below      Assessment: Tolerated treatment well. Pt continues to be challenged by dynamic balance activities and frequently requires support to maintain balance in narrow stance/staggered stance.  Patient demonstrated fatigue post treatment and would benefit from continued PT      Plan: Continue per plan of care.  Progress treatment as tolerated.       Precautions: Imbalance/Falls Risk    Access Code: [email or web address]       Insurance:  A/CMS Eval/ Re-eval Auth #/ Referral # Total units  Start date  Expiration date Extension  Visit limitation?  PT only or  PT+OT? Co-Insurance   CMS 24 NA       100% after deduct    24 NA           3/21/24 NA           24             POC Start Date POC Expiration Date Signed POC?   1/22/24 3/18/24 Pend   2024 Pending    3/21/2024 2024 yes   2024 Yes   2024 Yes   24 Pend      Date               Visits/Units:  Used               Authed:  Remaining                     Manuals 7/22 7/18 7/15 7/8 7/5 6/24 6/17    38 37 36 35 34 33 32                                 Neuro Re-Ed          Side step while playing catch with ball from staff member   PT providing CS throughout 2x10 20ft  PT providing CS throughout  2x10 20ft B      STS 2x10  2x10  10x   10x on airex    SLS cone tap (march alternate)   SLS at table w/ multidirectional cone taps 2x10  SLS at table   2x10 5s  2x10 B 1HR    Static Balance while playing catch and naming animals alphabetical    Romberg - 4 min  1/2 romberg - 3 min B  3/4 romberg - 3 min B  CS by PT throughout      Romberg EO - CS flex/ext +  rot      2x10 ea by railing and close supervision.    romberg w/EC           Turn in place          STS into walk around gym then return to chair      Walk around chair then around room - cues to reduce shuffling in small spaces -   5x60ft 5x40ft w/continued need for verbal cues for right step length.   Hurdles  Fwd w/ hand held PT assist 2x10   Fwd w/ hand held assist from PT 2x10 to emphasize foot clearance and step length       SLS hip abd       2x10 B   Tandem balance  Semi-tandem on airex pad with multidirectional balltoss x 12 min        Standing balance On airex w/ ball dribble 20x     On airex in 1/2 rhomberg w/ ball dribble 20x CS by PT  Feet apart on 2 airex beams with rotational throw/catch activity x 12        Bridge 2x10 5s hold         SLS toe off into heel contact on oppo       2x10 B   Walking with VOR      Head turns horizontal -   5x60ft close supervision    Obstacle course Weaving + step onto airex with reactive side step over charles          Ther Ex          Education  Provided information regarding handicapp placard application. Also discussed benefits on following up with neurologist due to fall history and worsening gait/memory        HEP          DGI     performed     5xSTS     performed     Mini best     performed     6MWT     performed     2MWT     performed     Seated HS str          Seated march       20x B   Sup piriformis str      3x30s B    Hook LS rot      2x10    Flexion in sitting      L/M/R  10x ea

## 2024-07-24 ENCOUNTER — OFFICE VISIT (OUTPATIENT)
Dept: PHYSICAL THERAPY | Facility: CLINIC | Age: 74
End: 2024-07-24
Payer: MEDICARE

## 2024-07-24 DIAGNOSIS — R26.89 IMBALANCE: ICD-10-CM

## 2024-07-24 DIAGNOSIS — R26.81 UNSTEADY GAIT WHEN WALKING: Primary | ICD-10-CM

## 2024-07-24 PROCEDURE — 97112 NEUROMUSCULAR REEDUCATION: CPT

## 2024-07-24 NOTE — PROGRESS NOTES
Daily Note     Today's date: 2024  Patient name: Sahara Patel  : 1950  MRN: [id number]  Referring provider: Cathryn Lopez MD  Dx:   Encounter Diagnosis     ICD-10-CM    1. Unsteady gait when walking  R26.81       2. Imbalance  R26.89           Start Time: 1230  Stop Time: 1315  Total time in clinic (min): 45 minutes    Subjective: Sahara reports she continues to feel like she is progressively slowing down and has been feeling unsteady. Pt reports her back is bothering her a bit today.      Objective: See treatment diary below      Assessment: Tolerated treatment well. Pt experienced difficulty maintaining balance during limits of stability exercises on Biodex today and required intermittent UE support to help control weight shifts and maintain balance. Pt did well maintaining postural stability on flat, noncompliant surface, however was challenged by static balance on compliant surface.  Patient demonstrated fatigue post treatment, exhibited good technique with therapeutic exercises, and would benefit from continued PT      Plan: Continue per plan of care.  Progress treatment as tolerated.       Precautions: Imbalance/Falls Risk    Access Code: [email or web address]       Insurance:  A/CMS Eval/ Re-eval Auth #/ Referral # Total units  Start date  Expiration date Extension  Visit limitation?  PT only or  PT+OT? Co-Insurance   CMS 24 NA       100% after deduct    24 NA           3/21/24 NA           24             POC Start Date POC Expiration Date Signed POC?   1/22/24 3/18/24 Pend   2024 Pending    3/21/2024 2024 yes   2024 Yes   2024 Yes   24 Pend      Date               Visits/Units:  Used               Authed:  Remaining                     Manuals 7/24 7/22 7/18 7/15 7/8    39 38 37 36 35                           Neuro Re-Ed        Side step while playing catch with ball from staff member    " PT providing CS throughout 2x10 20ft  PT providing CS throughout  2x10 20ft B   STS 2x10 2x10  2x10  10x   SLS cone tap (march alternate)    SLS at table w/ multidirectional cone taps 2x10  SLS at table   2x10 5s   Static Balance while playing catch and naming animals alphabetical     Romberg - 4 min  1/2 romberg - 3 min B  3/4 romberg - 3 min B  CS by PT throughout   Romberg EO - CS flex/ext +  rot        romberg w/EC         Turn in place        STS into walk around gym then return to chair        Hurdles   Fwd w/ hand held PT assist 2x10   Fwd w/ hand held assist from PT 2x10 to emphasize foot clearance and step length    SLS hip abd        Tandem balance   Semi-tandem on airex pad with multidirectional balltoss x 12 min     Standing balance  On airex w/ ball dribble 20x     On airex in 1/2 rhomberg w/ ball dribble 20x CS by PT  Feet apart on 2 airex beams with rotational throw/catch activity x 12     Bridge 2x10 5s hold 2x10 5s hold      SLS toe off into heel contact on oppo        Walking with VOR        Obstacle course  Weaving + step onto airex with reactive side step over charles       Biodex Limits of stability L10 3x     Postural stability training L10 1min x 3 + on airex 1min x 2        Step up  6\" box w/ 1 UE support 15x ea    6\" box + airex w/ 1UE support 15x ea        Ther Ex        Education   Provided information regarding handicapp placard application. Also discussed benefits on following up with neurologist due to fall history and worsening gait/memory     HEP        DGI        5xSTS        Mini best        6MWT        2MWT        Seated HS str        Seated march        Sup piriformis str        Hook LS rot 20x       Flexion in sitting                                             "

## 2024-07-29 ENCOUNTER — OFFICE VISIT (OUTPATIENT)
Dept: PHYSICAL THERAPY | Facility: CLINIC | Age: 74
End: 2024-07-29
Payer: MEDICARE

## 2024-07-29 DIAGNOSIS — R26.89 IMBALANCE: ICD-10-CM

## 2024-07-29 DIAGNOSIS — R26.81 UNSTEADY GAIT WHEN WALKING: Primary | ICD-10-CM

## 2024-07-29 PROCEDURE — 97110 THERAPEUTIC EXERCISES: CPT

## 2024-07-29 PROCEDURE — 97112 NEUROMUSCULAR REEDUCATION: CPT

## 2024-07-29 NOTE — PROGRESS NOTES
Daily Note     Today's date: 2024  Patient name: Sahara Patel  : 1950  MRN: [id number]  Referring provider: Cathryn Lopez MD  Dx:   Encounter Diagnosis     ICD-10-CM    1. Unsteady gait when walking  R26.81       2. Imbalance  R26.89           Start Time: 1235  Stop Time: 1315  Total time in clinic (min): 40 minutes    Subjective: Sahara reports she has been trying to walk more however tends to get tired quickly. Pt reports her back is bothering her more than usual today.      Objective: See treatment diary below      Assessment: Tolerated treatment well. Sahara continues to be challenged by LOS activities especially on compliant surfaces, and requires constant UE assistance to maintain balance. Pt continues to demonstrate decreased muscular endurance and quickly becomes fatigued with strengthening activities. Patient demonstrated fatigue post treatment, exhibited good technique with therapeutic exercises, and would benefit from continued PT      Plan: Continue per plan of care.  Progress treatment as tolerated.       Precautions: Imbalance/Falls Risk    Access Code: [email or web address]       Insurance:  A/CMS Eval/ Re-eval Auth #/ Referral # Total units  Start date  Expiration date Extension  Visit limitation?  PT only or  PT+OT? Co-Insurance   CMS 24 NA       100% after deduct    24 NA           3/21/24 NA           24             POC Start Date POC Expiration Date Signed POC?   1/22/24 3/18/24 Pend   2024 Pending    3/21/2024 2024 yes   2024 Yes   2024 Yes   24 Pend      Date               Visits/Units:  Used               Authed:  Remaining                     Manuals 7/29 7/24 7/22 7/18 7/15    40 39 38 37 36                           Neuro Re-Ed        Side step while playing catch with ball from staff member     PT providing CS throughout 2x10 20ft    STS  2x10 2x10  2x10    SLS cone tap  "(march alternate)     SLS at table w/ multidirectional cone taps 2x10    Static Balance while playing catch and naming animals alphabetical        Romberg EO - CS flex/ext +  rot        romberg w/EC         Turn in place        STS into walk around gym then return to chair        Hurdles    Fwd w/ hand held PT assist 2x10   Fwd w/ hand held assist from PT 2x10 to emphasize foot clearance and step length   SLS hip abd        Tandem balance    Semi-tandem on airex pad with multidirectional balltoss x 12 min    Standing balance   On airex w/ ball dribble 20x     On airex in 1/2 rhomberg w/ ball dribble 20x CS by PT  Feet apart on 2 airex beams with rotational throw/catch activity x 12    Bridge 2x10 5s hold 2x10 5s hold 2x10 5s hold     SLS toe off into heel contact on oppo        Walking with VOR        Obstacle course   Weaving + step onto airex with reactive side step over charles      Biodex LOS L10 3x 2UE support    Static 2x w/o UE support   Limits of stability L10 3x     Postural stability training L10 1min x 3 + on airex 1min x 2       Step up 6\" + airex 1UE support 2x10 ea   6\" box w/ 1 UE support 15x ea    6\" box + airex w/ 1UE support 15x ea       Ther Ex        Education    Provided information regarding handicapp placard application. Also discussed benefits on following up with neurologist due to fall history and worsening gait/memory    HEP        DGI        5xSTS        Mini best        6MWT        2MWT        Seated HS str        Seated march        Sup piriformis str        Hook LS rot 20x  20x      Flexion in sitting                                               "

## 2024-07-31 ENCOUNTER — OFFICE VISIT (OUTPATIENT)
Dept: PHYSICAL THERAPY | Facility: CLINIC | Age: 74
End: 2024-07-31
Payer: MEDICARE

## 2024-07-31 DIAGNOSIS — R26.89 IMBALANCE: ICD-10-CM

## 2024-07-31 DIAGNOSIS — R26.81 UNSTEADY GAIT WHEN WALKING: Primary | ICD-10-CM

## 2024-07-31 PROCEDURE — 97110 THERAPEUTIC EXERCISES: CPT

## 2024-07-31 PROCEDURE — 97112 NEUROMUSCULAR REEDUCATION: CPT

## 2024-07-31 NOTE — PROGRESS NOTES
Daily Note     Today's date: 2024  Patient name: Sahara Patel  : 1950  MRN: [id number]  Referring provider: Cathryn Lopez MD  Dx:   Encounter Diagnosis     ICD-10-CM    1. Unsteady gait when walking  R26.81       2. Imbalance  R26.89           Start Time: 1102  Stop Time: 1145  Total time in clinic (min): 43 minutes    Subjective: Sahara reports she continues to feel very slow and unsteady when walking. Pt states her back is feeling stiff today. Pt reports she was able to move her surgery up to the end of August for her parathyroid removal and has an MRI on her spine scheduled for Monday.       Objective: See treatment diary below      Assessment: Tolerated treatment well. Pt tolerated lower extremity strengthening and balance exercises well, however quickly becomes fatigued and required frequent seated rest breaks for recovery. Patient demonstrated fatigue post treatment, exhibited good technique with therapeutic exercises, and would benefit from continued PT      Plan: Continue per plan of care.  Progress treatment as tolerated.       Precautions: Imbalance/Falls Risk    Access Code: [email or web address]       Insurance:  AMA/CMS Eval/ Re-eval Auth #/ Referral # Total units  Start date  Expiration date Extension  Visit limitation?  PT only or  PT+OT? Co-Insurance   CMS 24 NA       100% after deduct    24 NA           3/21/24 NA           24             POC Start Date POC Expiration Date Signed POC?   1/22/24 3/18/24 Pend   2024 Pending    3/21/2024 2024 yes   2024 Yes   2024 Yes   24 Pend      Date               Visits/Units:  Used               Authed:  Remaining                     Manuals 7/31 7/29 7/24 7/22 7/18 7/15    41 40 39 38 37 36                              Neuro Re-Ed         Side step while playing catch with ball from staff member      PT providing CS throughout 2x10 20ft    STS  "2x10   2x10 2x10  2x10    SLS cone tap (march alternate)      SLS at table w/ multidirectional cone taps 2x10    Static Balance while playing catch and naming animals alphabetical         Romberg EO - CS flex/ext +  rot         romberg w/EC          Turn in place         STS into walk around gym then return to chair         Hurdles  W/ kathryn resistance 1.0 10x fwd/retro w/ hand held assist    Fwd w/ hand held PT assist 2x10   Fwd w/ hand held assist from PT 2x10 to emphasize foot clearance and step length   SLS hip abd         Tandem balance     Semi-tandem on airex pad with multidirectional balltoss x 12 min    Standing balance    On airex w/ ball dribble 20x     On airex in 1/2 rhomberg w/ ball dribble 20x CS by PT  Feet apart on 2 airex beams with rotational throw/catch activity x 12    Bridge 2x10 5s hold  2x10 5s hold 2x10 5s hold 2x10 5s hold     SLS toe off into heel contact on oppo         Walking with VOR         Obstacle course    Weaving + step onto airex with reactive side step over charles      Biodex  LOS L10 3x 2UE support    Static 2x w/o UE support   Limits of stability L10 3x     Postural stability training L10 1min x 3 + on airex 1min x 2       Step up 6\" + airex 1UE support 2x10 ea  6\" + airex 1UE support 2x10 ea   6\" box w/ 1 UE support 15x ea    6\" box + airex w/ 1UE support 15x ea       Ther Ex         Education     Provided information regarding handicapp placard application. Also discussed benefits on following up with neurologist due to fall history and worsening gait/memory    HEP         DGI         5xSTS         Mini best         6MWT         2MWT         Seated HS str         Seated march         Sup piriformis str         Hook LS rot 20x  20x  20x      Flexion in sitting W/ blue Pball 10x 3 ways         [email or web address] str 2x30s ea side                                                  "

## 2024-08-05 ENCOUNTER — EVALUATION (OUTPATIENT)
Dept: PHYSICAL THERAPY | Facility: CLINIC | Age: 74
End: 2024-08-05
Payer: MEDICARE

## 2024-08-05 DIAGNOSIS — R26.89 IMBALANCE: ICD-10-CM

## 2024-08-05 DIAGNOSIS — R26.81 UNSTEADY GAIT WHEN WALKING: Primary | ICD-10-CM

## 2024-08-05 PROCEDURE — 97110 THERAPEUTIC EXERCISES: CPT

## 2024-08-05 NOTE — PROGRESS NOTES
PT Re-Evaluation     Today's date: 2024  Patient name: Sahara Patel  : 1950  MRN: [id number]  Referring provider: Cathryn Lopez MD  Dx:   Encounter Diagnosis     ICD-10-CM    1. Unsteady gait when walking  R26.81       2. Imbalance  R26.89           Start Time: 1145  Stop Time: 1230  Total time in clinic (min): 45 minutes    Assessment  Current assessment details: Sahara Patel presents for re-evaluation at [facility] for her impaired gait and imbalance. Since her previous re-evaluation, she has shown improvement in STS, mCTSIB, and 2MWT, however showed minimal improvement in her 6MWT and slight regression in her DGI score. Pt demonstrated slight regression in her quality of gait and evidence of increasing imbalance during her 6MWT and DGI assessment with consistent shuffling of her feet and loss of balance when pivoting/turning. Plan to complete mini-best test during next session to further evaluate level of imbalance, dynamic gait, and anticipatory balance reactions. Pt does not show significant decrease in isolated LE strength, however she is showing evidence of declining functional strength and functional mobility. Based on functional assessment scores, shuffling gait, increasing imbalance, decreasing functional strength and mobility, and lack of endurance, pt is at elevated fall risk. Additionally, pt continues to demonstrate changes in cognitive function with changes in memory function. She may benefit from further consultation with neurologist to investigate underlying cause of increasing imbalance and worsening gait deviations. Sahara Patel will continue to benefit from skilled physical therapy services to address her above deficits to decrease overall risk of falls, lack of endurance, and improve functional strength and mobility.       Impairments: abnormal gait, abnormal or restricted ROM, abnormal movement, activity intolerance, impaired balance, impaired physical strength, lacks  appropriate home exercise program, poor posture  and poor body mechanics  Understanding of Dx/Px/POC: good   Prognosis: good    Goals  STG 2-4 weeks:  1. Improve 5xSTS score by >4 seconds indicating improve ability to transfer STS. - progressing toward  2. Pt will demonstrate increase DGI score by 1-2 points as evidence of decreased imbalance. - progressing toward   3. Pt will be I with basic HEP. met    LTG 4-8 weeks:  1. Pt will improve 6 minute walk test by > 250ft as evidence of improved functional mobility - Not Met   2. Pt will increase her ABC scale score to >75% - Not Met   3. Pt will be I with comprehensive HEP. In progress  4. Improve hip strength by 1/2 grade in all deficient planes. Met  5. Pt will demo >21/24 on dynamic gait index. - Not met      Plan  Plan details: HEP development, stretching, strengthening, A/AA/PROM, joint mobilizations, posture education, STM/MI as needed to reduce muscle tension, balance and proprioceptive training, muscle reeducation, PLOC discussed and agreed upon with patient.    Patient would benefit from: PT eval and skilled physical therapy  Planned modality interventions: cryotherapy and thermotherapy: hydrocollator packs  Planned therapy interventions: manual therapy, neuromuscular re-education, self care, therapeutic activities, therapeutic exercise and home exercise program  Frequency: 1-2x week  Duration in weeks: 8  Treatment plan discussed with: patient      Subjective Evaluation    History of Present Illness  Current Subjective:  Sahara Patel reports she feels her balance has continued to be worse and she continues to feel unsteady throughout her day. Pt reports she was able to return to a pilates class last week, however was not able to complete the whole class due to extremes of fatigue. Pt states she continues to notice changes in her memory. Pt reports she has been feeling like her functional limitations have started to impact her emotionally and she is  feeling very frustrated. Pt is scheduled for parathyroid removal on August 26.     Patient Goals  Patient goals for therapy: improved balance, increased strength and independence with ADLs/IADLs    Social Support  Steps to enter house: yes  4  Stairs in house: yes   4  Lives in: multiple-level home  Lives with: significant other    Employment status: not working  Exercise history: Pilates - 1-2x/wk        Objective     Postural Observations  Seated posture: fair  Standing posture: fair      Neurological Testing     Sensation     Lumbar   Left   Diminished: light touch    Right   Diminished: light touch    Additional Neurological Details  Evidence of 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     Strength/Myotome Testing     Left Hip   Planes of Motion   Flexion: 4+   External rotation: 4+  Internal rotation: 4+    Right Hip   Planes of Motion   Flexion: 4+  External rotation: 4+  Internal rotation: 4+    Left Knee   Flexion: 5  Extension: 5    Right Knee   Flexion: 5  Extension: 5    Left Ankle/Foot   Dorsiflexion: 5  Plantar flexion: 5    Right Ankle/Foot   Dorsiflexion: 5  Plantar flexion: 5    Ambulation     Ambulation: Stairs   Ascend stairs: independent  Pattern: reciprocal  Railings: one rail  Descend stairs: independent  Pattern: reciprocal  Railings: one rail    Additional Stairs Ambulation Details  Requires use of railing to carry objects up and down the stairs     Observational Gait     Additional Observational Gait Details  Intermittent lateral sway, shuffling of feet L>R due to reduced foot clearance  Increased external rotation in L LE through gait cycle   Shortened step length     Functional Assessment:    Significant shuffling of feet, more evident when dual tasking or unable to focus on her walking. Small shuffling steps to turn, frequent LOB when pivoting/turning especially in small areas. Evidence of imbalance and increased risk of falls.       DGI  MDC: vestibular - 4 pts  MDC: geriatric/community -  3 pts  Falls risk <19/24 5xSTS: Lucila et al 2010  MDC: 2.3 sec  Age Norms:  60-69: 11.1 sec  70-79: 12.6 sec  80-89: 14.8 sec   6 Minute Walk Test  Age Norms  60-69: M - 1876 ft (571.80 m)  F - 1765 ft (537.98 m)  70-79: M - 1729 ft (527.00 m)  F - 1545 ft (470.92 m)  80-89: M - 1368 ft (416.97 m)  F - 1286 ft (391.97 m) mCTSIB  Norm: 20-60 yrs  Eyes open firm: norm sway 0.21-0.48  Eyes closed firm: norm sway 0.48-0.99  Eyes open foam: norm sway 0.38-0.71  Eyes closed foam: norm sway 0.70-2.22       Outcome Measures Initial Eval   2/21/2024  3/21/2024 4/22/2024  5/30/24  7/5/24 8/5/24   5xSTS 22.06s 17.00 1LOB 15.44s  Trial 1: 23.43s  Trial 2: 19.09s    UE assist  22.36s no UE support  20.97s no UE assist 18.26 no UE assist    mCTSIB  - FTEO (firm)  - FTEC (firm)  - FTEO (foam)  - FTEC (foam) 0.94  1.28  1.38  2.75 w/ 1 grab of HR  Composite:1.58 0.61  1.27  1.50  2.77 w/ 1 grab of HR    Composite: 1.53 0.91  1.23  1.42  3.06 w/ 1 grab of HR      Composite:  1.65 0.96   1.16   1.27   3.34     Composite: 1.68   1. 1.00  2. 1.33  3.  1.26  4. 3.39 >5 touches of hand rail  Composite:  1.74   0.61  0.90  1.02  2.70 w/ 2 grab of HR    Composite: 1.30   6MWT 1100ft 1275ft 1250ft 1LOB while turning  1300ft   1250ft  1190 1150ft   2MWT 375ft 440ft 400ft 450ft   410ft  375ft 400ft    DGI 19/24 21/24 21/24 21/24 18/24 17/24 16/24    ABC  70.6%  57.5%            Mini-BesTest      15/28 To be assessed next session            Precautions: Imbalance/Falls Risk    Access Code: [email or web address]       Insurance:  AMA/CMS Eval/ Re-eval Auth #/ Referral # Total units  Start date  Expiration date Extension  Visit limitation?  PT only or  PT+OT? Co-Insurance   CMS 1/22/24 NA       100% after deduct    2/21/24 NA           3/21/24 NA           4/22/24 5/30/24 7/5/24 8/5/24             POC Start Date POC Expiration Date Signed POC?   1/22/24 3/18/24 Pend   2/21/2024 4/17/2024 Pending    3/21/2024 5/16/2024 yes  "  4/22/2024 6/17/2024 Yes   5/30/2024 7/25/24 Yes   7/5/24 8/30/2024 Pend   8/5/2024 9/30/2024 pend      Date               Visits/Units:  Used               Authed:  Remaining                     Manuals 8/5 7/31 7/29 7/24 7/22    42 41 40 39 38                           Neuro Re-Ed        Side step while playing catch with ball from staff member        STS  2x10   2x10 2x10   SLS cone tap (march alternate)        Static Balance while playing catch and naming animals alphabetical        Romberg EO - CS flex/ext +  rot        romberg w/EC         Turn in place        STS into walk around gym then return to chair        Hurdles   W/ kathryn resistance 1.0 10x fwd/retro w/ hand held assist    Fwd w/ hand held PT assist 2x10    SLS hip abd        Tandem balance        Standing balance     On airex w/ ball dribble 20x     On airex in 1/2 rhomberg w/ ball dribble 20x CS by PT    Bridge  2x10 5s hold  2x10 5s hold 2x10 5s hold 2x10 5s hold   SLS toe off into heel contact on oppo        Walking with VOR        Obstacle course     Weaving + step onto airex with reactive side step over charles    Biodex   LOS L10 3x 2UE support    Static 2x w/o UE support   Limits of stability L10 3x     Postural stability training L10 1min x 3 + on airex 1min x 2     Step up  6\" + airex 1UE support 2x10 ea  6\" + airex 1UE support 2x10 ea   6\" box w/ 1 UE support 15x ea    6\" box + airex w/ 1UE support 15x ea     Ther Ex        Education        HEP        DGI Performed        5xSTS Performed        Mini best To be assessed        6MWT Performed        2MWT Performed        Seated HS str        Seated march        Sup piriformis str        Hook LS rot  20x  20x  20x    Flexion in sitting  W/ blue Pball 10x 3 ways       KATARZYNA str  2x30s ea side              "

## 2024-08-05 NOTE — LETTER
2024    Cathryn Lopez MD  [facility]    Patient: Sahara Patel   YOB: 1950   Date of Visit: 2024     Encounter Diagnosis     ICD-10-CM    1. Unsteady gait when walking  R26.81       2. Imbalance  R26.89           Dear Dr. Lopez:    Thank you for your recent referral of Sahara Patel. Please review the attached evaluation summary from Sahara's recent visit.     Please verify that you agree with the plan of care by signing the attached order.     If you have any questions or concerns, please do not hesitate to call.     I sincerely appreciate the opportunity to share in the care of one of your patients and hope to have another opportunity to work with you in the near future.       Sincerely,    Lynda Fierro, PT      Referring Provider:      I certify that I have read the below Plan of Care and certify the need for these services furnished under this plan of treatment while under my care.                    Cathryn Lopez MD  [facility]  Via Fax: [phone]          PT Re-Evaluation     Today's date: 2024  Patient name: Sahara Patel  : 1950  MRN: [id number]  Referring provider: Cathryn Lopez MD  Dx:   Encounter Diagnosis     ICD-10-CM    1. Unsteady gait when walking  R26.81       2. Imbalance  R26.89           Start Time: 1145  Stop Time: 1230  Total time in clinic (min): 45 minutes    Assessment  Current assessment details: Sahara Patel presents for re-evaluation at [facility] for her impaired gait and imbalance. Since her previous re-evaluation, she has shown improvement in STS, mCTSIB, and 2MWT, however showed minimal improvement in her 6MWT and slight regression in her DGI score. Pt demonstrated slight regression in her quality of gait and evidence of increasing imbalance during her 6MWT and DGI assessment with consistent shuffling of her feet and loss of balance when pivoting/turning. Plan to  complete mini-best test during next session to further evaluate level of imbalance, dynamic gait, and anticipatory balance reactions. Pt does not show significant decrease in isolated LE strength, however she is showing evidence of declining functional strength and functional mobility. Based on functional assessment scores, shuffling gait, increasing imbalance, decreasing functional strength and mobility, and lack of endurance, pt is at elevated fall risk. Additionally, pt continues to demonstrate changes in cognitive function with changes in memory function. She may benefit from further consultation with neurologist to investigate underlying cause of increasing imbalance and worsening gait deviations. Sahara Patel will continue to benefit from skilled physical therapy services to address her above deficits to decrease overall risk of falls, lack of endurance, and improve functional strength and mobility.       Impairments: abnormal gait, abnormal or restricted ROM, abnormal movement, activity intolerance, impaired balance, impaired physical strength, lacks appropriate home exercise program, poor posture  and poor body mechanics  Understanding of Dx/Px/POC: good   Prognosis: good    Goals  STG 2-4 weeks:  1. Improve 5xSTS score by >4 seconds indicating improve ability to transfer STS. - progressing toward  2. Pt will demonstrate increase DGI score by 1-2 points as evidence of decreased imbalance. - progressing toward   3. Pt will be I with basic HEP. met    LTG 4-8 weeks:  1. Pt will improve 6 minute walk test by > 250ft as evidence of improved functional mobility - Not Met   2. Pt will increase her ABC scale score to >75% - Not Met   3. Pt will be I with comprehensive HEP. In progress  4. Improve hip strength by 1/2 grade in all deficient planes. Met  5. Pt will demo >21/24 on dynamic gait index. - Not met      Plan  Plan details: HEP development, stretching, strengthening, A/AA/PROM, joint mobilizations,  posture education, STM/MI as needed to reduce muscle tension, balance and proprioceptive training, muscle reeducation, PLOC discussed and agreed upon with patient.    Patient would benefit from: PT eval and skilled physical therapy  Planned modality interventions: cryotherapy and thermotherapy: hydrocollator packs  Planned therapy interventions: manual therapy, neuromuscular re-education, self care, therapeutic activities, therapeutic exercise and home exercise program  Frequency: 1-2x week  Duration in weeks: 8  Treatment plan discussed with: patient      Subjective Evaluation    History of Present Illness  Current Subjective:  Sahara Patel reports she feels her balance has continued to be worse and she continues to feel unsteady throughout her day. Pt reports she was able to return to a [employer] class last week, however was not able to complete the whole class due to extremes of fatigue. Pt states she continues to notice changes in her memory. Pt reports she has been feeling like her functional limitations have started to impact her emotionally and she is feeling very frustrated. Pt is scheduled for parathyroid removal on August 26.     Patient Goals  Patient goals for therapy: improved balance, increased strength and independence with ADLs/IADLs    Social Support  Steps to enter house: yes  4  Stairs in house: yes   4  Lives in: multiple-level home  Lives with: significant other    Employment status: not working  Exercise history: Pilates - 1-2x/wk        Objective     Postural Observations  Seated posture: fair  Standing posture: fair      Neurological Testing     Sensation     Lumbar   Left   Diminished: light touch    Right   Diminished: light touch    Additional Neurological Details  Evidence of 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     Strength/Myotome Testing     Left Hip   Planes of Motion   Flexion: 4+   External rotation: 4+  Internal rotation: 4+    Right Hip   Planes of Motion   Flexion:  4+  External rotation: 4+  Internal rotation: 4+    Left Knee   Flexion: 5  Extension: 5    Right Knee   Flexion: 5  Extension: 5    Left Ankle/Foot   Dorsiflexion: 5  Plantar flexion: 5    Right Ankle/Foot   Dorsiflexion: 5  Plantar flexion: 5    Ambulation     Ambulation: Stairs   Ascend stairs: independent  Pattern: reciprocal  Railings: one rail  Descend stairs: independent  Pattern: reciprocal  Railings: one rail    Additional Stairs Ambulation Details  Requires use of railing to carry objects up and down the stairs     Observational Gait     Additional Observational Gait Details  Intermittent lateral sway, shuffling of feet L>R due to reduced foot clearance  Increased external rotation in L LE through gait cycle   Shortened step length     Functional Assessment:    Significant shuffling of feet, more evident when dual tasking or unable to focus on her walking. Small shuffling steps to turn, frequent LOB when pivoting/turning especially in small areas. Evidence of imbalance and increased risk of falls.       DGI  MDC: vestibular - 4 pts  MDC: geriatric/community - 3 pts  Falls risk <19/24 5xSTS: Lucila et al 2010  MDC: 2.3 sec  Age Norms:  60-69: 11.1 sec  70-79: 12.6 sec  80-89: 14.8 sec   6 Minute Walk Test  Age Norms  60-69: M - 1876 ft (571.80 m)  F - 1765 ft (537.98 m)  70-79: M - 1729 ft (527.00 m)  F - 1545 ft (470.92 m)  80-89: M - 1368 ft (416.97 m)  F - 1286 ft (391.97 m) mCTSIB  Norm: 20-60 yrs  Eyes open firm: norm sway 0.21-0.48  Eyes closed firm: norm sway 0.48-0.99  Eyes open foam: norm sway 0.38-0.71  Eyes closed foam: norm sway 0.70-2.22       Outcome Measures Initial Eval   2/21/2024  3/21/2024 4/22/2024  5/30/24  7/5/24 8/5/24   5xSTS 22.06s 17.00 1LOB 15.44s  Trial 1: 23.43s  Trial 2: 19.09s    UE assist  22.36s no UE support  20.97s no UE assist 18.26 no UE assist    mCTSIB  - FTEO (firm)  - FTEC (firm)  - FTEO (foam)  - FTEC (foam) 0.94  1.28  1.38  2.75 w/ 1 grab of HR  Composite:1.58  0.61  1.27  1.50  2.77 w/ 1 grab of HR    Composite: 1.53 0.91  1.23  1.42  3.06 w/ 1 grab of HR      Composite:  1.65 0.96   1.16   1.27   3.34     Composite: 1.68   1. 1.00  2. 1.33  3.  1.26  4. 3.39 >5 touches of hand rail  Composite:  1.74   0.61  0.90  1.02  2.70 w/ 2 grab of HR    Composite: 1.30   6MWT 1100ft 1275ft 1250ft 1LOB while turning  1300ft   1250ft  1190 1150ft   2MWT 375ft 440ft 400ft 450ft   410ft  375ft 400ft    DGI 19/24 21/24 21/24 21/24 18/24 17/24 16/24    ABC  70.6%  57.5%            Mini-BesTest      15/28 To be assessed next session            Precautions: Imbalance/Falls Risk    Access Code: [email or web address]       Insurance:  [place]/CMS Eval/ Re-eval Auth #/ Referral # Total units  Start date  Expiration date Extension  Visit limitation?  PT only or  PT+OT? Co-Insurance   CMS 1/22/24 NA       100% after deduct    2/21/24 NA           3/21/24 NA           4/22/24 5/30/24 7/5/24 8/5/24             POC Start Date POC Expiration Date Signed POC?   1/22/24 3/18/24 Pend   2/21/2024 4/17/2024 Pending    3/21/2024 5/16/2024 yes   4/22/2024 6/17/2024 Yes   5/30/2024 7/25/24 Yes   7/5/24 8/30/2024 Pend   8/5/2024 9/30/2024 pend      Date               Visits/Units:  Used               Authed:  Remaining                     Manuals 8/5 7/31 7/29 7/24 7/22    42 41 40 39 38                           Neuro Re-Ed        Side step while playing catch with ball from staff member        STS  2x10   2x10 2x10   SLS cone tap (march alternate)        Static Balance while playing catch and naming animals alphabetical        Romberg EO - CS flex/ext +  rot        romberg w/EC         Turn in place        STS into walk around gym then return to chair        Hurdles   W/ kathryn resistance 1.0 10x fwd/retro w/ hand held assist    Fwd w/ hand held PT assist 2x10    SLS hip abd        Tandem balance        Standing balance     On airex w/ ball dribble 20x     On airex in 1/2 magdalenoerg w/ ball  "dribble 20x CS by PT    Bridge  2x10 5s hold  2x10 5s hold 2x10 5s hold 2x10 5s hold   SLS toe off into heel contact on oppo        Walking with VOR        Obstacle course     Weaving + step onto airex with reactive side step over charles    Biodex   LOS L10 3x 2UE support    Static 2x w/o UE support   Limits of stability L10 3x     Postural stability training L10 1min x 3 + on airex 1min x 2     Step up  6\" + airex 1UE support 2x10 ea  6\" + airex 1UE support 2x10 ea   6\" box w/ 1 UE support 15x ea    6\" box + airex w/ 1UE support 15x ea     Ther Ex        Education        HEP        DGI Performed        5xSTS Performed        Mini best To be assessed        6MWT Performed        2MWT Performed        Seated HS str        Seated march        Sup piriformis str        Hook LS rot  20x  20x  20x    Flexion in sitting  W/ blue Pball 10x 3 ways       KATARZYNA str  2x30s ea side                              "

## 2024-08-07 ENCOUNTER — OFFICE VISIT (OUTPATIENT)
Dept: PHYSICAL THERAPY | Facility: CLINIC | Age: 74
End: 2024-08-07
Payer: MEDICARE

## 2024-08-07 DIAGNOSIS — R26.81 UNSTEADY GAIT WHEN WALKING: Primary | ICD-10-CM

## 2024-08-07 DIAGNOSIS — R26.89 IMBALANCE: ICD-10-CM

## 2024-08-07 PROCEDURE — 97110 THERAPEUTIC EXERCISES: CPT

## 2024-08-07 PROCEDURE — 97112 NEUROMUSCULAR REEDUCATION: CPT

## 2024-08-07 NOTE — PROGRESS NOTES
Daily Note     Today's date: 2024  Patient name: Sahara Patel  : 1950  MRN: [id number]  Referring provider: Cathryn Lopez MD  Dx:   Encounter Diagnosis     ICD-10-CM    1. Unsteady gait when walking  R26.81       2. Imbalance  R26.89           Start Time: 1230  Stop Time: 1315  Total time in clinic (min): 45 minutes    Subjective: Sahara reports she felt like she could hardly move today and had to take two motrin this morning.       Objective: See treatment diary below      Assessment: Tolerated treatment well. Sahara was feeling very fatigued during today's session and required frequent rest breaks. She continues to be challenged by balance activities and requires consistent guarding.  Patient demonstrated fatigue post treatment, exhibited good technique with therapeutic exercises, and would benefit from continued PT      Plan: Continue per plan of care.  Progress treatment as tolerated.       Precautions: Imbalance/Falls Risk    Access Code: [email or web address]       Insurance:  A/CMS Eval/ Re-eval Auth #/ Referral # Total units  Start date  Expiration date Extension  Visit limitation?  PT only or  PT+OT? Co-Insurance   CMS 24 NA       100% after deduct    24 NA           3/21/24 NA           24             POC Start Date POC Expiration Date Signed POC?   1/22/24 3/18/24 Pend   2024 Pending    3/21/2024 2024 yes   2024 Yes   2024 Yes   24 Pend   2024 pend      Date               Visits/Units:  Used               Authed:  Remaining                     Manuals     43 42 41 40 39 38                              Neuro Re-Ed         Side step while playing catch with ball from staff member         STS 2x10   2x10   2x10 2x10   SLS cone tap ()         Static Balance while playing catch and naming animals alphabetical        "  Romberg EO - CS flex/ext +  rot         romberg w/EC          Turn in place         STS into walk around gym then return to chair         Hurdles  W/ kahtryn resistance 1.5 fwd & retro w/  hand held assist 5x   W/ kathryn resistance 1.0 10x fwd/retro w/ hand held assist    Fwd w/ hand held PT assist 2x10    SLS hip abd         Tandem balance         Standing balance On airex w/ multidirectional cone taps 10x ea side      On airex w/ ball dribble 20x     On airex in 1/2 rhomberg w/ ball dribble 20x CS by PT    Bridge 2x10 5s hold w/ add iso  2x10 5s hold  2x10 5s hold 2x10 5s hold 2x10 5s hold   SLS toe off into heel contact on oppo         Walking with VOR         Obstacle course      Weaving + step onto airex with reactive side step over charles    Biodex    LOS L10 3x 2UE support    Static 2x w/o UE support   Limits of stability L10 3x     Postural stability training L10 1min x 3 + on airex 1min x 2     Step up   6\" + airex 1UE support 2x10 ea  6\" + airex 1UE support 2x10 ea   6\" box w/ 1 UE support 15x ea    6\" box + airex w/ 1UE support 15x ea     Ther Ex         Education         HEP         DGI  Performed        5xSTS  Performed        Mini best  To be assessed        6MWT  Performed        2MWT  Performed        Seated HS str         Seated march         Sup piriformis str         Hook LS rot   20x  20x  20x    Flexion in sitting W/ BPB 10x 3 ways   W/ blue Pball 10x 3 ways       KATARZYNA str   2x30s ea side              "

## 2024-08-12 ENCOUNTER — OFFICE VISIT (OUTPATIENT)
Dept: PHYSICAL THERAPY | Facility: CLINIC | Age: 74
End: 2024-08-12
Payer: MEDICARE

## 2024-08-12 DIAGNOSIS — R26.89 IMBALANCE: ICD-10-CM

## 2024-08-12 DIAGNOSIS — R26.81 UNSTEADY GAIT WHEN WALKING: Primary | ICD-10-CM

## 2024-08-12 PROCEDURE — 97112 NEUROMUSCULAR REEDUCATION: CPT | Performed by: PHYSICAL THERAPIST

## 2024-08-12 NOTE — PROGRESS NOTES
Daily Note     Today's date: 2024  Patient name: Sahara Patel  : 1950  MRN: [id number]  Referring provider: Cathryn Lopez MD  Dx:   Encounter Diagnosis     ICD-10-CM    1. Unsteady gait when walking  R26.81       2. Imbalance  R26.89           Start Time: 1151  Stop Time: 1237  Total time in clinic (min): 46 minutes    Subjective: Sahara reports seeing her physician the other day who confirmed her symptoms are from her parathyroid. She has her surgery on 24. She feels fatigued.      Objective: See treatment diary below      Assessment: Tolerated treatment well. Sahara was feeling very fatigued during today's session and required frequent short duration rest breaks in between and during sets. Min cues for completion of task. She demonstrated fatigue post treatment, exhibited good technique with therapeutic exercises, and would benefit from continued PT      Plan: Continue per plan of care.  Progress treatment as tolerated.       Precautions: Imbalance/Falls Risk    Access Code: [email or web address]       Insurance:  A/CMS Eval/ Re-eval Auth #/ Referral # Total units  Start date  Expiration date Extension  Visit limitation?  PT only or  PT+OT? Co-Insurance   CMS 24 NA       100% after deduct    24 NA           3/21/24 NA           24             POC Start Date POC Expiration Date Signed POC?   1/22/24 3/18/24 Pend   2024 Pending    3/21/2024 2024 yes   2024 Yes   2024 Yes   24 Pend   2024 pend      Date               Visits/Units:  Used               Authed:  Remaining                     Manuals     44 43 42 41 40 39 38                                 Neuro Re-Ed          Side step while playing catch with ball from staff member          STS 2x10 (with seated rest breaks throughout) 2x10   2x10   2x10 2x10   SLS cone tap (march  "alternate)          Static Balance while playing catch and naming animals alphabetical          Romberg EO - CS flex/ext +  rot          romberg w/EC  On airex pad with H/V head turns, 30x each         Side stepping Along airex beam 10 feet x 10         STS into walk around gym then return to chair          Hurdles  Fwd/bwd stepping over charles, standing on airex pads 30x W/ kathryn resistance 1.5 fwd & retro w/  hand held assist 5x   W/ kathryn resistance 1.0 10x fwd/retro w/ hand held assist    Fwd w/ hand held PT assist 2x10    SLS hip abd          Tandem balance Fwd tandem walking along airex be, 10 feet x 7         Standing balance On airex pad turning and looking over shoulder 30x each way, CG-min A On airex w/ multidirectional cone taps 10x ea side      On airex w/ ball dribble 20x     On airex in 1/2 rhomberg w/ ball dribble 20x CS by PT    Bridge  2x10 5s hold w/ add iso  2x10 5s hold  2x10 5s hold 2x10 5s hold 2x10 5s hold   SLS toe off into heel contact on oppo          Walking with VOR          Obstacle course       Weaving + step onto airex with reactive side step over charles    Biodex     LOS L10 3x 2UE support    Static 2x w/o UE support   Limits of stability L10 3x     Postural stability training L10 1min x 3 + on airex 1min x 2     Step up    6\" + airex 1UE support 2x10 ea  6\" + airex 1UE support 2x10 ea   6\" box w/ 1 UE support 15x ea    6\" box + airex w/ 1UE support 15x ea     Ther Ex          Education          HEP          DGI   Performed        5xSTS   Performed        Mini best   To be assessed        6MWT   Performed        2MWT   Performed        Seated HS str 3x30 sec BLE         Seated march          Sup piriformis str          Hook LS rot    20x  20x  20x    Flexion in sitting  W/ BPB 10x 3 ways   W/ blue Pball 10x 3 ways       KATARZYNA str    2x30s ea side              "

## 2024-08-15 ENCOUNTER — OFFICE VISIT (OUTPATIENT)
Dept: PHYSICAL THERAPY | Facility: CLINIC | Age: 74
End: 2024-08-15
Payer: MEDICARE

## 2024-08-15 DIAGNOSIS — R26.89 IMBALANCE: ICD-10-CM

## 2024-08-15 DIAGNOSIS — R26.81 UNSTEADY GAIT WHEN WALKING: Primary | ICD-10-CM

## 2024-08-15 PROCEDURE — 97110 THERAPEUTIC EXERCISES: CPT

## 2024-08-15 PROCEDURE — 97112 NEUROMUSCULAR REEDUCATION: CPT

## 2024-08-15 NOTE — PROGRESS NOTES
Daily Note     Today's date: 8/15/2024  Patient name: Sahara Patel  : 1950  MRN: [id number]  Referring provider: Cathryn Lopez MD  Dx:   Encounter Diagnosis     ICD-10-CM    1. Unsteady gait when walking  R26.81       2. Imbalance  R26.89           Start Time: 1315  Stop Time: 1400  Total time in clinic (min): 45 minutes    Subjective: Sahara reports she is planning to try a lower level pilates class this weekend, but she is feeling really weak.      Objective: See treatment diary below      Assessment: Tolerated treatment well. Sahara continues to require frequent seated rest breaks throughout session due to fatigued and decreased muscular endurance. She had difficulty today with balance activities on compliant surfaces and required consistent UE support to maintain stability. Patient demonstrated fatigue post treatment, exhibited good technique with therapeutic exercises, and would benefit from continued PT      Plan: Continue per plan of care.  Progress treatment as tolerated.       Precautions: Imbalance/Falls Risk    Access Code: [email or web address]       Insurance:  AMA/CMS Eval/ Re-eval Auth #/ Referral # Total units  Start date  Expiration date Extension  Visit limitation?  PT only or  PT+OT? Co-Insurance   CMS 24 NA       100% after deduct    24 NA           3/21/24 NA           24             POC Start Date POC Expiration Date Signed POC?   1/22/24 3/18/24 Pend   2024 Pending    3/21/2024 2024 yes   2024 Yes   2024 Yes   24 Pend   2024 pend      Date               Visits/Units:  Used               Authed:  Remaining                     Manuals 8/15 8/12 8/7 8/5 7/31 7/29 7/24 7/22    45 44 43 42 41 40 39 38                                    Neuro Re-Ed           Side step while playing catch with ball from staff member           STS 2x10 w/ seated rest breaks  " 2x10 (with seated rest breaks throughout) 2x10   2x10   2x10 2x10   SLS cone tap (march alternate)           Static Balance while playing catch and naming animals alphabetical           Romberg EO - CS flex/ext +  rot           romberg w/EC   On airex pad with H/V head turns, 30x each         Side stepping Airex beam 10ft x 10  Along airex beam 10 feet x 10         STS into walk around gym then return to chair           Hurdles  Fwd/bwd stepping over charles, standing on airex pads 20x  Fwd/bwd stepping over charles, standing on airex pads 30x W/ kathryn resistance 1.5 fwd & retro w/  hand held assist 5x   W/ kathryn resistance 1.0 10x fwd/retro w/ hand held assist    Fwd w/ hand held PT assist 2x10    SLS hip abd           Tandem balance Fwd tandem walking on airex beam 10ft x 10  Fwd tandem walking along airex be, 10 feet x 7         Standing balance Standing marching 15x  On airex pad turning and looking over shoulder 30x each way, CG-min A On airex w/ multidirectional cone taps 10x ea side      On airex w/ ball dribble 20x     On airex in 1/2 rhomberg w/ ball dribble 20x CS by PT    Bridge 2x10 5s hold   2x10 5s hold w/ add iso  2x10 5s hold  2x10 5s hold 2x10 5s hold 2x10 5s hold   SLS toe off into heel contact on oppo           Walking with VOR           Obstacle course        Weaving + step onto airex with reactive side step over charles    Biodex      LOS L10 3x 2UE support    Static 2x w/o UE support   Limits of stability L10 3x     Postural stability training L10 1min x 3 + on airex 1min x 2     Step up     6\" + airex 1UE support 2x10 ea  6\" + airex 1UE support 2x10 ea   6\" box w/ 1 UE support 15x ea    6\" box + airex w/ 1UE support 15x ea     Ther Ex           Education           HEP           DGI    Performed        5xSTS    Performed        Mini best    To be assessed        6MWT    Performed        2MWT    Performed        Seated HS str  3x30 sec BLE         Seated march           Sup piriformis str        "    Hook LS rot 20x    20x  20x  20x    Flexion in sitting   W/ BPB 10x 3 ways   W/ blue Pball 10x 3 ways       KATARZYNA str     2x30s ea side       Standing HR 10x parallel, 10x V

## 2024-08-20 ENCOUNTER — OFFICE VISIT (OUTPATIENT)
Dept: PHYSICAL THERAPY | Facility: CLINIC | Age: 74
End: 2024-08-20
Payer: MEDICARE

## 2024-08-20 DIAGNOSIS — R26.81 UNSTEADY GAIT WHEN WALKING: Primary | ICD-10-CM

## 2024-08-20 DIAGNOSIS — R26.89 IMBALANCE: ICD-10-CM

## 2024-08-20 PROCEDURE — 97110 THERAPEUTIC EXERCISES: CPT

## 2024-08-20 PROCEDURE — 97112 NEUROMUSCULAR REEDUCATION: CPT

## 2024-08-20 NOTE — PROGRESS NOTES
Daily Note     Today's date: 2024  Patient name: Sahara Patel  : 1950  MRN: [id number]  Referring provider: Cathryn Lopez MD  Dx:   Encounter Diagnosis     ICD-10-CM    1. Unsteady gait when walking  R26.81       2. Imbalance  R26.89           Start Time: 1615  Stop Time: 1700  Total time in clinic (min): 45 minutes    Subjective: Sahara reports she spent most of the day cooking and is feeling really tired and slow. Pt states she ended up cancelling her pilates class on Friday due to extreme fatigue.       Objective: See treatment diary below      Assessment: Tolerated treatment well. Sahara required frequent rest breaks throughout session today due to increase in overall fatigue. She continues to rely on UE support to maintain balance with dynamic activities. Patient demonstrated fatigue post treatment, exhibited good technique with therapeutic exercises, and would benefit from continued PT      Plan: Continue per plan of care.  Progress treatment as tolerated.       Precautions: Imbalance/Falls Risk    Access Code: [email or web address]       Insurance:  A/CMS Eval/ Re-eval Auth #/ Referral # Total units  Start date  Expiration date Extension  Visit limitation?  PT only or  PT+OT? Co-Insurance   CMS 24 NA       100% after deduct    24 NA           3/21/24 NA           24             POC Start Date POC Expiration Date Signed POC?   1/22/24 3/18/24 Pend   2024 Pending    3/21/2024 2024 yes   2024 Yes   2024 Yes   24 Pend   2024 pend      Date               Visits/Units:  Used               Authed:  Remaining                     Manuals 8/20 8/15 8/12 8/7 8/5 7/31 7/29 7/24 7/22    46 45 44 43 42 41 40 39 38                                       Neuro Re-Ed            Side step while playing catch with ball from staff member            STS 2x10 w/ seated rest breaks  " 2x10 w/ seated rest breaks  2x10 (with seated rest breaks throughout) 2x10   2x10   2x10 2x10   SLS cone tap (march alternate)            Static Balance while playing catch and naming animals alphabetical            Romberg EO - CS flex/ext +  rot            romberg w/EC    On airex pad with H/V head turns, 30x each         Side stepping Airex beam [id number]  Airex beam 10ft x 10  Along airex beam 10 feet x 10         STS into walk around gym then return to chair            Hurdles   Fwd/bwd stepping over charles, standing on airex pads 20x  Fwd/bwd stepping over charles, standing on airex pads 30x W/ kathryn resistance 1.5 fwd & retro w/  hand held assist 5x   W/ kathryn resistance 1.0 10x fwd/retro w/ hand held assist    Fwd w/ hand held PT assist 2x10    SLS hip abd            Tandem balance Fwd/retro tandem walking on airex beam 10ft x10  Fwd tandem walking on airex beam 10ft x 10  Fwd tandem walking along airex be, 10 feet x 7         Standing balance Standing marching 15x  Standing marching 15x  On airex pad turning and looking over shoulder 30x each way, CG-min A On airex w/ multidirectional cone taps 10x ea side      On airex w/ ball dribble 20x     On airex in 1/2 rhomberg w/ ball dribble 20x CS by PT    Bridge 2x10 5s hold  2x10 5s hold   2x10 5s hold w/ add iso  2x10 5s hold  2x10 5s hold 2x10 5s hold 2x10 5s hold   SLS toe off into heel contact on oppo            Walking with VOR            Obstacle course         Weaving + step onto airex with reactive side step over charles    Biodex       LOS L10 3x 2UE support    Static 2x w/o UE support   Limits of stability L10 3x     Postural stability training L10 1min x 3 + on airex 1min x 2     Step up      6\" + airex 1UE support 2x10 ea  6\" + airex 1UE support 2x10 ea   6\" box w/ 1 UE support 15x ea    6\" box + airex w/ 1UE support 15x ea     squat On airex at railing 2x10            Ther Ex            Education            HEP            DGI     Performed      "   5xSTS     Performed        Mini best     To be assessed        6MWT     Performed        2MWT     Performed        Seated HS str   3x30 sec BLE         Seated march            Sup piriformis str            Hook LS rot 20x  20x    20x  20x  20x    Flexion in sitting W/ BPB 10x 3 ways    W/ BPB 10x 3 ways   W/ blue Pball 10x 3 ways       KATARZYNA str      2x30s ea side       Standing HR 10x parallel, 10x V on airex beam  10x parallel, 10x V

## 2024-08-22 ENCOUNTER — OFFICE VISIT (OUTPATIENT)
Dept: PHYSICAL THERAPY | Facility: CLINIC | Age: 74
End: 2024-08-22
Payer: MEDICARE

## 2024-08-22 DIAGNOSIS — R26.89 IMBALANCE: ICD-10-CM

## 2024-08-22 DIAGNOSIS — R26.81 UNSTEADY GAIT WHEN WALKING: Primary | ICD-10-CM

## 2024-08-22 PROCEDURE — 97110 THERAPEUTIC EXERCISES: CPT

## 2024-08-22 PROCEDURE — 97112 NEUROMUSCULAR REEDUCATION: CPT

## 2024-08-22 NOTE — PROGRESS NOTES
Daily Note     Today's date: 2024  Patient name: Sahara Patel  : 1950  MRN: [id number]  Referring provider: Cathryn Lopez MD  Dx:   Encounter Diagnosis     ICD-10-CM    1. Unsteady gait when walking  R26.81       2. Imbalance  R26.89           Start Time: 1400  Stop Time: 1445  Total time in clinic (min): 45 minutes    Subjective: Sahara reports she was very earlier today and is feeling more tired than usual. Pt reports she had a good day yesterday and felt like she was moving a bit better. Pt states she is undergoing surgery for her parathyroid removal on Monday.       Objective: See treatment diary below      Assessment: Tolerated treatment well.  Pt required seated rest breaks throughout session due to decreased endurance and overall fatigue. Discussed temporarily pausing physical therapy due to her parathyroid removal surgery on Monday and resuming once cleared by MD with pt in agreement. Patient demonstrated fatigue post treatment, exhibited good technique with therapeutic exercises, and would benefit from continued PT with re-assessment upon return post-op.      Plan: Continue per plan of care.  Progress treatment as tolerated.       Precautions: Imbalance/Falls Risk    Access Code: [email or web address]       Insurance:  A/CMS Eval/ Re-eval Auth #/ Referral # Total units  Start date  Expiration date Extension  Visit limitation?  PT only or  PT+OT? Co-Insurance   CMS 24 NA       100% after deduct    24 NA           3/21/24 NA           24             POC Start Date POC Expiration Date Signed POC?   1/22/24 3/18/24 Pend   2024 Pending    3/21/2024 2024 yes   2024 Yes   2024 Yes   24 Pend   2024 pend      Date               Visits/Units:  Used               Authed:  Remaining                     Manuals 8/22 8/20 8/15 8/12 8/7 8/5    47 46 45 44 43 42           "                    Neuro Re-Ed         Side step while playing catch with ball from staff member         STS 10x  2x10 w/ seated rest breaks  2x10 w/ seated rest breaks  2x10 (with seated rest breaks throughout) 2x10     SLS cone tap (march alternate) 10x multidirectional taps         Static Balance while playing catch and naming animals alphabetical         Romberg EO - CS flex/ext +  rot         romberg w/EC     On airex pad with H/V head turns, 30x each     Side stepping  Airex beam [id number]  Airex beam 10ft x 10  Along airex beam 10 feet x 10     STS into walk around gym then return to chair         Hurdles  Lateral stepping over charles on airex beam 15x   Fwd/bwd stepping over charles, standing on airex pads 20x  Fwd/bwd stepping over charles, standing on airex pads 30x W/ kathryn resistance 1.5 fwd & retro w/  hand held assist 5x     SLS hip abd         Tandem balance Fwd/retro tandem walking on airex [id number]  Fwd/retro tandem walking on airex beam 10ft x10  Fwd tandem walking on airex beam 10ft x 10  Fwd tandem walking along airex be, 10 feet x 7     Standing balance  Standing marching 15x  Standing marching 15x  On airex pad turning and looking over shoulder 30x each way, CG-min A On airex w/ multidirectional cone taps 10x ea side     Bridge 2x10 5s hold w/ add iso  2x10 5s hold  2x10 5s hold   2x10 5s hold w/ add iso    SLS toe off into heel contact on oppo         Walking with VOR         Obstacle course         Biodex         Step up 8\" box 1UE support 10x ea        squat  On airex at railing 2x10        Ther Ex         Education         HEP         DGI      Performed    5xSTS      Performed    Mini best      To be assessed    6MWT      Performed    2MWT      Performed    Seated HS str    3x30 sec BLE     Seated march         Sup piriformis str         Hook LS rot 20x  20x  20x      Flexion in sitting W/ BPB 10x 3 ways  W/ BPB 10x 3 ways    W/ BPB 10x 3 ways     KATARZYNA str         Standing HR 15x parallel, " 10x V on airex  10x parallel, 10x V on airex beam  10x parallel, 10x V

## 2024-11-04 ENCOUNTER — EVALUATION (OUTPATIENT)
Dept: PHYSICAL THERAPY | Facility: CLINIC | Age: 74
End: 2024-11-04
Payer: MEDICARE

## 2024-11-04 DIAGNOSIS — G62.9 NEUROPATHY: ICD-10-CM

## 2024-11-04 DIAGNOSIS — R26.9 GAIT DIFFICULTY: Primary | ICD-10-CM

## 2024-11-04 DIAGNOSIS — R26.89 IMBALANCE: ICD-10-CM

## 2024-11-04 PROCEDURE — 97110 THERAPEUTIC EXERCISES: CPT | Performed by: PHYSICAL THERAPIST

## 2024-11-04 NOTE — PROGRESS NOTES
PT Re-Evaluation     Today's date: 2024  Patient name: Sahara Patel  : 1950  MRN: [id number]  Referring provider: Rubi Bill MD  Dx:   Encounter Diagnosis     ICD-10-CM    1. Gait difficulty  R26.9       2. Neuropathy  G62.9       3. Imbalance  R26.89                        Assessment  Current assessment details: Sahara Patel presents for an evaluation following a >2 month gap in treatment in which she underwent partial thyroidectomy and removal of one of her parathyroid glands.  During her procedure she was found to have cancerous tissue that was removed and is awaiting further testing to determine if potential further surgery will be necessary.  During this time she has self reports of a decline in her endurance, energy, balance and overall functional mobility.  She has been seen by a neurologist who believes there may be several contributing factors to her recent decline but has referred her to return to OPPT while undergoing further testing.  During today's evaluation Sahara Patel demonstrated significant decline in her functional mobility levels with evidence of higher imbalance and increased risk of falls.  Patient's Dynamic Gait Index score was a 9/24 indicating a high risk of falls  and decreased by 7 points since her last evaluation.  Her quality of gait has also declined with difficulty initiating gait pattern, evidence of intermittent shuffling of feet, uncontrolled changes in gait speed with slight forward LOB and now relying on SPC.  This poor quality of gait become more evident during her 6 minute walk test as she fatigued.  She was also only able to complete 3:10 of her 6 MWT prior to her needing seated rest while only walking 440ft compared to prior assessment of 1150ft.  She frequently demonstrated tendency to reach outside her OYLI while preparing for transfer from stand to sit increasing her risk of falls.  Due to these above impairments she will benefit from  skilled intervention to improve LE strength, quality of gait, balance and proprioceptive responses, endurance training, transfer training to improve her overall functional mobility and decrease risk of falls.    Impairments: abnormal gait, abnormal or restricted ROM, abnormal movement, activity intolerance, impaired balance, impaired physical strength, lacks appropriate home exercise program, poor posture  and poor body mechanics  Understanding of Dx/Px/POC: good   Prognosis: good    Goals  STG 2-4 weeks:  1. Improve 5xSTS score by >4 seconds indicating improve ability to transfer STS.   2. Pt will demonstrate increase DGI score by 1-2 points as evidence of decreased imbalance.    3. Pt will be able to complete 6 minute walk test    LTG 4-8 weeks:  1. Pt will improve 6 minute walk test to >1000ft   2. Pt will be I with comprehensive HEP.   3. Improve hip strength by 1/2 grade in all deficient planes.  4. Pt will demo >14/24 on dynamic gait index.       Plan  Plan details: HEP development, stretching, strengthening, A/AA/PROM, joint mobilizations, posture education, STM/MI as needed to reduce muscle tension, balance and proprioceptive training, muscle reeducation, PLOC discussed and agreed upon with patient.    Patient would benefit from: PT eval and skilled physical therapy  Planned modality interventions: cryotherapy and thermotherapy: hydrocollator packs  Planned therapy interventions: manual therapy, neuromuscular re-education, self care, therapeutic activities, therapeutic exercise and home exercise program  Frequency: 1-2x week  Duration in weeks: 8  Treatment plan discussed with: patient      Subjective Evaluation    History of Present Illness  Sahara Patel underwent procedure to remove half of thyroid and one parathyroid on 8/26/24.  During the procedure potential cancer was found in the thyroid and confirmed via biopsy.  She is to have further testing to determine if she will need to have parathyroid  "removed on opposite site and/or more of her thyroid.  She has also seen a neurologist specializing in movement and balance.  She did not see any evidence of PD, MS or any other conditions but contributes her increase in balance deficits due to her neuropathy, thryoid issues and diabetes.  She referred her back to OPPT at this time.    Since last being in therapy at this facility on 8/22/24.  Sahara Patel reports since then she feels significantly more fatigued, more imbalanced and having more difficulty getting around.  She states some days are better/worse than others.  She denies any falls in past month but has been more reliant on utilizing SPC for ambulation.  She has been avoiding walking outside on her own, relies on significant other to go with her for grocery shopping.  She also reports some days she is feeling \"foggy\" which is causing her to avoid driving.      Patient Goals  Patient goals for therapy: improved balance, increased strength and independence with ADLs/IADLs    Social Support  Steps to enter house: yes  4  Stairs in house: yes   4  Lives in: multiple-level home  Lives with: significant other    Employment status: not working  Exercise history: Pilates - 1-2x/wk      Objective     Postural Observations  Seated posture: fair  Standing posture: fair    Neurological Testing     Sensation     Lumbar   Left   Diminished: light touch    Right   Diminished: light touch    Additional Neurological Details  Evidence of 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     Strength/Myotome Testing     Left Hip   Planes of Motion   Flexion: 4+   External rotation: 4  Internal rotation: 4    Right Hip   Planes of Motion   Flexion: 4+  External rotation: 4-  Internal rotation: 4    Left Knee   Flexion: 5  Extension: 5    Right Knee   Flexion: 4+  Extension: 4+    Left Ankle/Foot   Dorsiflexion: 5  Plantar flexion: 5    Right Ankle/Foot   Dorsiflexion: 5  Plantar flexion: 5    Ambulation     Ambulation: Stairs "   Ascend stairs: independent  Pattern: reciprocal  Railings: one rail  Descend stairs: independent  Pattern: reciprocal  Railings: one rail      Observational Gait     Patient utilizing SPC for ambulation.  She demonstrates decreased stride length and walking speed, limited bilateral foot clearance with shuffling of her feet.  She also demonstrated LOB with pivoting/turning, difficulty initiating movement and uncontrolled changes in her pace due to signs of forward LOB.  Evidence of overall increased risk of falls and need for close supervision.          DGI  MDC: vestibular - 4 pts  MDC: geriatric/community - 3 pts  Falls risk <19/24 5xSTS: Lucila et al 2010  MDC: 2.3 sec  Age Norms:  60-69: 11.1 sec  70-79: 12.6 sec  80-89: 14.8 sec   6 Minute Walk Test  Age Norms  60-69: M - 1876 ft (571.80 m)  F - 1765 ft (537.98 m)  70-79: M - 1729 ft (527.00 m)  F - 1545 ft (470.92 m)  80-89: M - 1368 ft (416.97 m)  F - 1286 ft (391.97 m) mCTSIB  Norm: 20-60 yrs  Eyes open firm: norm sway 0.21-0.48  Eyes closed firm: norm sway 0.48-0.99  Eyes open foam: norm sway 0.38-0.71  Eyes closed foam: norm sway 0.70-2.22       Outcome Measures 1/22/24   2/21/2024  3/21/2024 4/22/2024  5/30/24  7/5/24 8/5/24 11/4/24   5xSTS 22.06s 17.00 1LOB 15.44s  Trial 1: 23.43s  Trial 2: 19.09s    UE assist  22.36s no UE support  20.97s no UE assist 18.26 no UE assist  23.10 no UE assist - slight posterior LOB   mCTSIB  - FTEO (firm)  - FTEC (firm)  - FTEO (foam)  - FTEC (foam) 0.94  1.28  1.38  2.75 w/ 1 grab of HR  Composite:1.58 0.61  1.27  1.50  2.77 w/ 1 grab of HR    Composite: 1.53 0.91  1.23  1.42  3.06 w/ 1 grab of HR      Composite:  1.65 0.96   1.16   1.27   3.34     Composite: 1.68   1. 1.00  2. 1.33  3.  1.26  4. 3.39 >5 touches of hand rail  Composite:  1.74   0.61  0.90  1.02  2.70 w/ 2 grab of HR    Composite: 1.30 Deferred due to fatigue.   6MWT 1100ft 1275ft 1250ft 1LOB while turning  1300ft   1250ft  1190 1150ft 440ft @3:10   2MWT  375ft 440ft 400ft 450ft   410ft  375ft 400ft  300ft   DGI 19/24 21/24 21/24 21/24 18/24 17/24 16/24 9/24    ABC  70.6%  57.5%             Mini-BesTest      15/28 To be assessed next session             Precautions: Imbalance/Falls Risk    Access Code: [email or web address]       Insurance:  [place]/CMS Eval/ Re-eval Auth #/ Referral # Total units  Start date  Expiration date KX Visit limitation?  PT only or  PT+OT? Co-Insurance   CMS 11/4/24 NA    YES Prior 47 visits in 2024  100% after deduct                                                                             POC Start Date POC Expiration Date Signed POC?   11/4/24 12/30/24 Pend           Date 11/4              Visits/Units:  Used 1              Authed:  Remaining                     Manuals   11/4      IE                     Neuro Re-Ed                                                                        Ther Ex      Education   Reviewed POC   HEP      6 MWT   performed   5xSTS   performed   DGI   performed

## 2024-11-04 NOTE — LETTER
2024    Rubi Bill MD  [address]  # [place] [phone]    Patient: Sahara Patel   YOB: 1950   Date of Visit: 2024     Encounter Diagnosis     ICD-10-CM    1. Gait difficulty  R26.9       2. Neuropathy  G62.9       3. Imbalance  R26.89           Dear Dr. Bill:    Thank you for your recent referral of Sahara Patel. Please review the attached evaluation summary from Sahara's recent visit.     Please verify that you agree with the plan of care by signing the attached order.     If you have any questions or concerns, please do not hesitate to call.     I sincerely appreciate the opportunity to share in the care of one of your patients and hope to have another opportunity to work with you in the near future.       Sincerely,    Devon Robles, PT      Referring Provider:      I certify that I have read the below Plan of Care and certify the need for these services furnished under this plan of treatment while under my care.                    Rubi Bill MD  [address]  # [facility]  [place] [id number]  Via Mail          PT Re-Evaluation     Today's date: 2024  Patient name: Sahara Patel  : 1950  MRN: [id number]  Referring provider: Rubi Bill MD  Dx:   Encounter Diagnosis     ICD-10-CM    1. Gait difficulty  R26.9       2. Neuropathy  G62.9       3. Imbalance  R26.89                        Assessment  Current assessment details: Sahara Patel presents for an evaluation following a >2 month gap in treatment in which she underwent partial thyroidectomy and removal of one of her parathyroid glands.  During her procedure she was found to have cancerous tissue that was removed and is awaiting further testing to determine if potential further surgery will be necessary.  During this time she has self reports of a decline in her endurance, energy, balance and overall functional mobility.  She has been seen by a neurologist who believes there may  be several contributing factors to her recent decline but has referred her to return to OPPT while undergoing further testing.  During today's evaluation Sahara Patel demonstrated significant decline in her functional mobility levels with evidence of higher imbalance and increased risk of falls.  Patient's Dynamic Gait Index score was a 9/24 indicating a high risk of falls  and decreased by 7 points since her last evaluation.  Her quality of gait has also declined with difficulty initiating gait pattern, evidence of intermittent shuffling of feet, uncontrolled changes in gait speed with slight forward LOB and now relying on SPC.  This poor quality of gait become more evident during her 6 minute walk test as she fatigued.  She was also only able to complete 3:10 of her 6 MWT prior to her needing seated rest while only walking 440ft compared to prior assessment of 1150ft.  She frequently demonstrated tendency to reach outside her YOLI while preparing for transfer from stand to sit increasing her risk of falls.  Due to these above impairments she will benefit from skilled intervention to improve LE strength, quality of gait, balance and proprioceptive responses, endurance training, transfer training to improve her overall functional mobility and decrease risk of falls.    Impairments: abnormal gait, abnormal or restricted ROM, abnormal movement, activity intolerance, impaired balance, impaired physical strength, lacks appropriate home exercise program, poor posture  and poor body mechanics  Understanding of Dx/Px/POC: good   Prognosis: good    Goals  STG 2-4 weeks:  1. Improve 5xSTS score by >4 seconds indicating improve ability to transfer STS.   2. Pt will demonstrate increase DGI score by 1-2 points as evidence of decreased imbalance.    3. Pt will be able to complete 6 minute walk test    LTG 4-8 weeks:  1. Pt will improve 6 minute walk test to >1000ft   2. Pt will be I with comprehensive HEP.   3. Improve hip  "strength by 1/2 grade in all deficient planes.  4. Pt will demo >14/24 on dynamic gait index.       Plan  Plan details: HEP development, stretching, strengthening, A/AA/PROM, joint mobilizations, posture education, STM/MI as needed to reduce muscle tension, balance and proprioceptive training, muscle reeducation, PLOC discussed and agreed upon with patient.    Patient would benefit from: PT eval and skilled physical therapy  Planned modality interventions: cryotherapy and thermotherapy: hydrocollator packs  Planned therapy interventions: manual therapy, neuromuscular re-education, self care, therapeutic activities, therapeutic exercise and home exercise program  Frequency: 1-2x week  Duration in weeks: 8  Treatment plan discussed with: patient      Subjective Evaluation    History of Present Illness  Sahara Patel underwent procedure to remove half of thyroid and one parathyroid on 8/26/24.  During the procedure potential cancer was found in the thyroid and confirmed via biopsy.  She is to have further testing to determine if she will need to have parathyroid removed on opposite site and/or more of her thyroid.  She has also seen a neurologist specializing in movement and balance.  She did not see any evidence of PD, MS or any other conditions but contributes her increase in balance deficits due to her neuropathy, thryoid issues and diabetes.  She referred her back to OPPT at this time.    Since last being in therapy at this facility on 8/22/24.  Sahara Patel reports since then she feels significantly more fatigued, more imbalanced and having more difficulty getting around.  She states some days are better/worse than others.  She denies any falls in past month but has been more reliant on utilizing SPC for ambulation.  She has been avoiding walking outside on her own, relies on significant other to go with her for grocery shopping.  She also reports some days she is feeling \"foggy\" which is causing her to " avoid driving.      Patient Goals  Patient goals for therapy: improved balance, increased strength and independence with ADLs/IADLs    Social Support  Steps to enter house: yes  4  Stairs in house: yes   4  Lives in: multiple-level home  Lives with: significant other    Employment status: not working  Exercise history: Pilates - 1-2x/wk      Objective     Postural Observations  Seated posture: fair  Standing posture: fair    Neurological Testing     Sensation     Lumbar   Left   Diminished: light touch    Right   Diminished: light touch    Additional Neurological Details  Evidence of 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     Strength/Myotome Testing     Left Hip   Planes of Motion   Flexion: 4+   External rotation: 4  Internal rotation: 4    Right Hip   Planes of Motion   Flexion: 4+  External rotation: 4-  Internal rotation: 4    Left Knee   Flexion: 5  Extension: 5    Right Knee   Flexion: 4+  Extension: 4+    Left Ankle/Foot   Dorsiflexion: 5  Plantar flexion: 5    Right Ankle/Foot   Dorsiflexion: 5  Plantar flexion: 5    Ambulation     Ambulation: Stairs   Ascend stairs: independent  Pattern: reciprocal  Railings: one rail  Descend stairs: independent  Pattern: reciprocal  Railings: one rail      Observational Gait     Patient utilizing SPC for ambulation.  She demonstrates decreased stride length and walking speed, limited bilateral foot clearance with shuffling of her feet.  She also demonstrated LOB with pivoting/turning, difficulty initiating movement and uncontrolled changes in her pace due to signs of forward LOB.  Evidence of overall increased risk of falls and need for close supervision.          DGI  MDC: vestibular - 4 pts  MDC: geriatric/community - 3 pts  Falls risk <19/24 5xSTS: Lucila et al 2010  MDC: 2.3 sec  Age Norms:  60-69: 11.1 sec  70-79: 12.6 sec  80-89: 14.8 sec   6 Minute Walk Test  Age Norms  60-69: M - 1876 ft (571.80 m)  F - 1765 ft (537.98 m)  70-79: M - 1729 ft (527.00 m)  F - 1545  ft (470.92 m)  80-89: M - 1368 ft (416.97 m)  F - 1286 ft (391.97 m) mCTSIB  Norm: 20-60 yrs  Eyes open firm: norm sway 0.21-0.48  Eyes closed firm: norm sway 0.48-0.99  Eyes open foam: norm sway 0.38-0.71  Eyes closed foam: norm sway 0.70-2.22       Outcome Measures 1/22/24   2/21/2024  3/21/2024 4/22/2024  5/30/24  7/5/24 8/5/24 11/4/24   5xSTS 22.06s 17.00 1LOB 15.44s  Trial 1: 23.43s  Trial 2: 19.09s    UE assist  22.36s no UE support  20.97s no UE assist 18.26 no UE assist  23.10 no UE assist - slight posterior LOB   mCTSIB  - FTEO (firm)  - FTEC (firm)  - FTEO (foam)  - FTEC (foam) 0.94  1.28  1.38  2.75 w/ 1 grab of HR  Composite:1.58 0.61  1.27  1.50  2.77 w/ 1 grab of HR    Composite: 1.53 0.91  1.23  1.42  3.06 w/ 1 grab of HR      Composite:  1.65 0.96   1.16   1.27   3.34     Composite: 1.68   1. 1.00  2. 1.33  3.  1.26  4. 3.39 >5 touches of hand rail  Composite:  1.74   0.61  0.90  1.02  2.70 w/ 2 grab of HR    Composite: 1.30 Deferred due to fatigue.   6MWT 1100ft 1275ft 1250ft 1LOB while turning  1300ft   1250ft  1190 1150ft 440ft @3:10   2MWT 375ft 440ft 400ft 450ft   410ft  375ft 400ft  300ft   DGI 19/24 21/24 21/24 21/24 18/24 17/24 16/24 9/24    ABC  70.6%  57.5%             Mini-BesTest      15/28 To be assessed next session             Precautions: Imbalance/Falls Risk    Access Code: [email or web address]       Insurance:  [place]/CMS Eval/ Re-eval Auth #/ Referral # Total units  Start date  Expiration date KX Visit limitation?  PT only or  PT+OT? Co-Insurance   CMS 11/4/24 NA    YES Prior 47 visits in 2024  100% after deduct                                                                             POC Start Date POC Expiration Date Signed POC?   11/4/24 12/30/24 Pend           Date 11/4              Visits/Units:  Used 1              Authed:  Remaining                     Manuals   11/4      IE                     Neuro Re-Ed                                                                        Ther Ex       Education   Reviewed POC   HEP      6 MWT   performed   5xSTS   performed   DGI   performed

## 2024-12-03 ENCOUNTER — APPOINTMENT (OUTPATIENT)
Dept: PHYSICAL THERAPY | Facility: CLINIC | Age: 74
End: 2024-12-03
Payer: MEDICARE

## 2024-12-05 ENCOUNTER — EVALUATION (OUTPATIENT)
Dept: PHYSICAL THERAPY | Facility: CLINIC | Age: 74
End: 2024-12-05
Payer: MEDICARE

## 2024-12-05 DIAGNOSIS — R26.89 IMBALANCE: ICD-10-CM

## 2024-12-05 DIAGNOSIS — G62.9 NEUROPATHY: ICD-10-CM

## 2024-12-05 DIAGNOSIS — R26.9 GAIT DIFFICULTY: Primary | ICD-10-CM

## 2024-12-05 PROCEDURE — 97110 THERAPEUTIC EXERCISES: CPT | Performed by: PHYSICAL THERAPIST

## 2024-12-05 NOTE — LETTER
2024    Rubi Bill MD  [address]  # [place] [id number]    Patient: Sahara Patel   YOB: 1950   Date of Visit: 2024     Encounter Diagnosis     ICD-10-CM    1. Gait difficulty  R26.9       2. Imbalance  R26.89       3. Neuropathy  G62.9           Dear Dr. Bill:    Thank you for your recent referral of Sahara Patel. Please review the attached evaluation summary from Sahara's recent visit.     Please verify that you agree with the plan of care by signing the attached order.     If you have any questions or concerns, please do not hesitate to call.     I sincerely appreciate the opportunity to share in the care of one of your patients and hope to have another opportunity to work with you in the near future.       Sincerely,    Devon Robles, PT      Referring Provider:      I certify that I have read the below Plan of Care and certify the need for these services furnished under this plan of treatment while under my care.                    Rubi Bill MD  [address]  # [facility]  [place] [id number]  Via Mail          PT Re-Evaluation     Today's date: 2024  Patient name: Sahara Patel  : 1950  MRN: [id number]  Referring provider: Rubi Bill MD  Dx:   Encounter Diagnosis     ICD-10-CM    1. Gait difficulty  R26.9       2. Imbalance  R26.89       3. Neuropathy  G62.9                          Assessment  Current assessment details: Sahara Patel presents for an re-evaluation one month following her last evaluation.  She has not been seen at [facility] since this evaluation due to caring for her daughter following a medical procedure as well as her own doctor's visits.  She is now being referred to a new specialist for next surgical procedure.  She reports she may be having the 2nd half of her thryoid and parathyroid removed.  Over this past month she has noticed continued trend of decreasing energy and endurance, significant imbalance requiring  her to utilize SPC, hold onto her significant other or a shopping cart to be able to ambulate community and household distances.  During today's reassessment she demonstrates continued significant decreased endurance, balance reactions, functional outcomes scores compared to previous assessments as recent as August of this year.  She has not shown any significant further decline in last month but continues at a high risk of falls that coincides with her subjective complaints that are leading to significantly impaired ability to participate in her normal activities as PLOF.  She is to see her new specialist next week to determine POC regarding surgical procedures but will benefit from skilled therapy improve LE strength, quality of gait, balance and proprioceptive responses, endurance training, transfer training to improve her overall functional mobility and decrease risk of falls.    Impairments: abnormal gait, abnormal or restricted ROM, abnormal movement, activity intolerance, impaired balance, impaired physical strength, lacks appropriate home exercise program, poor posture  and poor body mechanics  Understanding of Dx/Px/POC: good   Prognosis: good    Goals  STG 2-4 weeks: - Not Met  1. Improve 5xSTS score by >4 seconds indicating improve ability to transfer STS.   2. Pt will demonstrate increase DGI score by 1-2 points as evidence of decreased imbalance.    3. Pt will be able to complete 6 minute walk test    LTG 4-8 weeks: - Not Met  1. Pt will improve 6 minute walk test to >1000ft   2. Pt will be I with comprehensive HEP.   3. Improve hip strength by 1/2 grade in all deficient planes.  4. Pt will demo >14/24 on dynamic gait index.       Plan  Plan details: HEP development, stretching, strengthening, A/AA/PROM, joint mobilizations, posture education, STM/MI as needed to reduce muscle tension, balance and proprioceptive training, muscle reeducation, PLOC discussed and agreed upon with patient.    Patient would  "benefit from: PT eval and skilled physical therapy  Planned modality interventions: cryotherapy and thermotherapy: hydrocollator packs  Planned therapy interventions: manual therapy, neuromuscular re-education, self care, therapeutic activities, therapeutic exercise and home exercise program  Frequency: 1-2x week  Duration in weeks: 8  Treatment plan discussed with: patient      Subjective Evaluation    History of Present Illness  Sahara Patel underwent procedure to remove half of thyroid and one parathyroid on 8/26/24.  During the procedure potential cancer was found in the thyroid and confirmed via biopsy.  She is to have further testing to determine if she will need to have parathyroid removed on opposite site and/or more of her thyroid.  She has also seen a neurologist specializing in movement and balance.  She did not see any evidence of PD, MS or any other conditions but contributes her increase in balance deficits due to her neuropathy, thryoid issues and diabetes.  She referred her back to [facility] at this time.    Since last being in therapy at this facility on 8/22/24.  Sahara Patel reports since then she feels significantly more fatigued, more imbalanced and having more difficulty getting around.  She states some days are better/worse than others.  She denies any falls in past month but has been more reliant on utilizing SPC for ambulation.  She has been avoiding walking outside on her own, relies on significant other to go with her for grocery shopping.  She also reports some days she is feeling \"foggy\" which is causing her to avoid driving.    Update 12/5/24:  Sahara Patel reports over the past month she has been experiencing significant fatigue that fluctuates day to day.  She states she is able to only do a couple things before she needs to sit and rest way more frequently than in the past.  She feels her balance is even worse forcing her to rely on leaning on shopping cart while in grocery " store.  She was seen by her thyroid specialist who is now referring her to another specialist for thoracic surgery to remove her remaining thyroid and parathyroid due to placement of thyroid and continues signs of cancer.        Patient Goals  Patient goals for therapy: improved balance, increased strength and independence with ADLs/IADLs    Social Support  Steps to enter house: yes  4  Stairs in house: yes   4  Lives in: multiple-level home  Lives with: significant other    Employment status: not working  Exercise history: Pilates - 1-2x/wk (not currently)      Objective     Postural Observations  Seated posture: fair  Standing posture: fair    Neurological Testing     Sensation     Lumbar   Left   Diminished: light touch    Right   Diminished: light touch    Additional Neurological Details  Evidence of 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     Strength/Myotome Testing     Left Hip   Planes of Motion   Flexion: 4+   External rotation: 4  Internal rotation: 4    Right Hip   Planes of Motion   Flexion: 4  External rotation: 4-  Internal rotation: 4    Left Knee   Flexion: 5  Extension: 5    Right Knee   Flexion: 4+  Extension: 4+    Left Ankle/Foot   Dorsiflexion: 5  Plantar flexion: 5    Right Ankle/Foot   Dorsiflexion: 5  Plantar flexion: 5    Ambulation     Ambulation: Stairs   Ascend stairs: independent  Pattern: reciprocal  Railings: one rail  Descend stairs: independent  Pattern: reciprocal  Railings: one rail      Observational Gait     Patient utilizing SPC for ambulation.  She demonstrates decreased stride length and walking speed, limited bilateral foot clearance with shuffling of her feet, left LE externally rotated.  She also demonstrated LOB with pivoting/turning, difficulty initiating movement and uncontrolled changes in her pace due to signs of forward LOB.  Evidence of overall increased risk of falls and need for close supervision.  As her fatigue levels increased during 6 minute walk test her  deficits all became more prevalent requiring close supervision and 2 incidences of LOB with need for min A to prevent fall.        DGI  MDC: vestibular - 4 pts  MDC: geriatric/community - 3 pts  Falls risk <19/24 5xSTS: Lucila et al 2010  MDC: 2.3 sec  Age Norms:  60-69: 11.1 sec  70-79: 12.6 sec  80-89: 14.8 sec   6 Minute Walk Test  Age Norms  60-69: M - 1876 ft (571.80 m)  F - 1765 ft (537.98 m)  70-79: M - 1729 ft (527.00 m)  F - 1545 ft (470.92 m)  80-89: M - 1368 ft (416.97 m)  F - 1286 ft (391.97 m) mCTSIB  Norm: 20-60 yrs  Eyes open firm: norm sway 0.21-0.48  Eyes closed firm: norm sway 0.48-0.99  Eyes open foam: norm sway 0.38-0.71  Eyes closed foam: norm sway 0.70-2.22       Outcome Measures 1/22/24   2/21/2024  3/21/2024 4/22/2024  5/30/24  7/5/24 8/5/24 11/4/24 12/5/24   5xSTS 22.06s 17.00 1LOB 15.44s  Trial 1: 23.43s  Trial 2: 19.09s    UE assist  22.36s no UE support  20.97s no UE assist 18.26 no UE assist  23.10 no UE assist - slight posterior LOB 17.87s no UE support - decreased evidence of posteror LOB   mCTSIB  - FTEO (firm)  - FTEC (firm)  - FTEO (foam)  - FTEC (foam) 0.94  1.28  1.38  2.75 w/ 1 grab of HR  Composite:1.58 0.61  1.27  1.50  2.77 w/ 1 grab of HR    Composite: 1.53 0.91  1.23  1.42  3.06 w/ 1 grab of HR      Composite:  1.65 0.96   1.16   1.27   3.34     Composite: 1.68   1. 1.00  2. 1.33  3.  1.26  4. 3.39 >5 touches of hand rail  Composite:  1.74   0.61  0.90  1.02  2.70 w/ 2 grab of HR    Composite: 1.30 Deferred due to fatigue. Deferred due to fatigue levels   6MWT 1100ft 1275ft 1250ft 1LOB while turning  1300ft   1250ft  1190 1150ft 440ft @3:10 550ft @315 with significant imbalance, need for min A to restore balance on 2 occasions.   2MWT 375ft 440ft 400ft 450ft   410ft  375ft 400ft  300ft 350ft   DGI 19/24 21/24 21/24 21/24 18/24 17/24 16/24 9/24 9/24    ABC  70.6%  57.5%              Mini-BesTest      15/28 To be assessed next session              Precautions: Imbalance/Falls  Risk    Access Code: [email or web address]       Insurance:  AMA/CMS Eval/ Re-eval Auth #/ Referral # Total units  Start date  Expiration date KX Visit limitation?  PT only or  PT+OT? Co-Insurance   CMS 11/4/24 NA    YES Prior 47 visits in 2024  100% after deduct    12/5/24                                                                         POC Start Date POC Expiration Date Signed POC?   11/4/24 12/30/24 Pend   12/5/24 1/30/25 Pend      Date 11/4 12/5             Visits/Units:  Used 1 2             Authed:  Remaining   RE                  Manuals  12/5 11/4     2 IE                     Neuro Re-Ed                                                                        Ther Ex      Education  Updated POC Reviewed POC   HEP      6 MWT  performed performed   5xSTS  performed performed   DGI  performed performed

## 2024-12-05 NOTE — PROGRESS NOTES
PT Re-Evaluation     Today's date: 2024  Patient name: Sahara Patel  : 1950  MRN: [id number]  Referring provider: Rubi Bill MD  Dx:   Encounter Diagnosis     ICD-10-CM    1. Gait difficulty  R26.9       2. Imbalance  R26.89       3. Neuropathy  G62.9                          Assessment  Current assessment details: Sahara Patel presents for an re-evaluation one month following her last evaluation.  She has not been seen at [facility] since this evaluation due to caring for her daughter following a medical procedure as well as her own doctor's visits.  She is now being referred to a new specialist for next surgical procedure.  She reports she may be having the 2nd half of her thryoid and parathyroid removed.  Over this past month she has noticed continued trend of decreasing energy and endurance, significant imbalance requiring her to utilize SPC, hold onto her significant other or a shopping cart to be able to ambulate community and household distances.  During today's reassessment she demonstrates continued significant decreased endurance, balance reactions, functional outcomes scores compared to previous assessments as recent as August of this year.  She has not shown any significant further decline in last month but continues at a high risk of falls that coincides with her subjective complaints that are leading to significantly impaired ability to participate in her normal activities as PLOF.  She is to see her new specialist next week to determine POC regarding surgical procedures but will benefit from skilled therapy improve LE strength, quality of gait, balance and proprioceptive responses, endurance training, transfer training to improve her overall functional mobility and decrease risk of falls.    Impairments: abnormal gait, abnormal or restricted ROM, abnormal movement, activity intolerance, impaired balance, impaired physical strength, lacks appropriate home exercise program, poor  posture  and poor body mechanics  Understanding of Dx/Px/POC: good   Prognosis: good    Goals  STG 2-4 weeks: - Not Met  1. Improve 5xSTS score by >4 seconds indicating improve ability to transfer STS.   2. Pt will demonstrate increase DGI score by 1-2 points as evidence of decreased imbalance.    3. Pt will be able to complete 6 minute walk test    LTG 4-8 weeks: - Not Met  1. Pt will improve 6 minute walk test to >1000ft   2. Pt will be I with comprehensive HEP.   3. Improve hip strength by 1/2 grade in all deficient planes.  4. Pt will demo >14/24 on dynamic gait index.       Plan  Plan details: HEP development, stretching, strengthening, A/AA/PROM, joint mobilizations, posture education, STM/MI as needed to reduce muscle tension, balance and proprioceptive training, muscle reeducation, PLOC discussed and agreed upon with patient.    Patient would benefit from: PT eval and skilled physical therapy  Planned modality interventions: cryotherapy and thermotherapy: hydrocollator packs  Planned therapy interventions: manual therapy, neuromuscular re-education, self care, therapeutic activities, therapeutic exercise and home exercise program  Frequency: 1-2x week  Duration in weeks: 8  Treatment plan discussed with: patient      Subjective Evaluation    History of Present Illness  Sahara Patel underwent procedure to remove half of thyroid and one parathyroid on 8/26/24.  During the procedure potential cancer was found in the thyroid and confirmed via biopsy.  She is to have further testing to determine if she will need to have parathyroid removed on opposite site and/or more of her thyroid.  She has also seen a neurologist specializing in movement and balance.  She did not see any evidence of PD, MS or any other conditions but contributes her increase in balance deficits due to her neuropathy, thryoid issues and diabetes.  She referred her back to [facility] at this time.    Since last being in therapy at this facility on  "8/22/24.  Sahara Patel reports since then she feels significantly more fatigued, more imbalanced and having more difficulty getting around.  She states some days are better/worse than others.  She denies any falls in past month but has been more reliant on utilizing SPC for ambulation.  She has been avoiding walking outside on her own, relies on significant other to go with her for grocery shopping.  She also reports some days she is feeling \"foggy\" which is causing her to avoid driving.    Update 12/5/24:  Sahara Patel reports over the past month she has been experiencing significant fatigue that fluctuates day to day.  She states she is able to only do a couple things before she needs to sit and rest way more frequently than in the past.  She feels her balance is even worse forcing her to rely on leaning on shopping cart while in grocery store.  She was seen by her thyroid specialist who is now referring her to another specialist for thoracic surgery to remove her remaining thyroid and parathyroid due to placement of thyroid and continues signs of cancer.        Patient Goals  Patient goals for therapy: improved balance, increased strength and independence with ADLs/IADLs    Social Support  Steps to enter house: yes  4  Stairs in house: yes   4  Lives in: multiple-level home  Lives with: significant other    Employment status: not working  Exercise history: Pilates - 1-2x/wk (not currently)      Objective     Postural Observations  Seated posture: fair  Standing posture: fair    Neurological Testing     Sensation     Lumbar   Left   Diminished: light touch    Right   Diminished: light touch    Additional Neurological Details  Evidence of 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     Strength/Myotome Testing     Left Hip   Planes of Motion   Flexion: 4+   External rotation: 4  Internal rotation: 4    Right Hip   Planes of Motion   Flexion: 4  External rotation: 4-  Internal rotation: 4    Left Knee   Flexion: " 5  Extension: 5    Right Knee   Flexion: 4+  Extension: 4+    Left Ankle/Foot   Dorsiflexion: 5  Plantar flexion: 5    Right Ankle/Foot   Dorsiflexion: 5  Plantar flexion: 5    Ambulation     Ambulation: Stairs   Ascend stairs: independent  Pattern: reciprocal  Railings: one rail  Descend stairs: independent  Pattern: reciprocal  Railings: one rail      Observational Gait     Patient utilizing SPC for ambulation.  She demonstrates decreased stride length and walking speed, limited bilateral foot clearance with shuffling of her feet, left LE externally rotated.  She also demonstrated LOB with pivoting/turning, difficulty initiating movement and uncontrolled changes in her pace due to signs of forward LOB.  Evidence of overall increased risk of falls and need for close supervision.  As her fatigue levels increased during 6 minute walk test her deficits all became more prevalent requiring close supervision and 2 incidences of LOB with need for min A to prevent fall.        DGI  MDC: vestibular - 4 pts  MDC: geriatric/community - 3 pts  Falls risk <19/24 5xSTS: Lucila et al 2010  MDC: 2.3 sec  Age Norms:  60-69: 11.1 sec  70-79: 12.6 sec  80-89: 14.8 sec   6 Minute Walk Test  Age Norms  60-69: M - 1876 ft (571.80 m)  F - 1765 ft (537.98 m)  70-79: M - 1729 ft (527.00 m)  F - 1545 ft (470.92 m)  80-89: M - 1368 ft (416.97 m)  F - 1286 ft (391.97 m) mCTSIB  Norm: 20-60 yrs  Eyes open firm: norm sway 0.21-0.48  Eyes closed firm: norm sway 0.48-0.99  Eyes open foam: norm sway 0.38-0.71  Eyes closed foam: norm sway 0.70-2.22       Outcome Measures 1/22/24   2/21/2024  3/21/2024 4/22/2024  5/30/24  7/5/24 8/5/24 11/4/24 12/5/24   5xSTS 22.06s 17.00 1LOB 15.44s  Trial 1: 23.43s  Trial 2: 19.09s    UE assist  22.36s no UE support  20.97s no UE assist 18.26 no UE assist  23.10 no UE assist - slight posterior LOB 17.87s no UE support - decreased evidence of posteror LOB   mCTSIB  - FTEO (firm)  - FTEC (firm)  - FTEO (foam)  - FTEC  (foam) 0.94  1.28  1.38  2.75 w/ 1 grab of HR  Composite:1.58 0.61  1.27  1.50  2.77 w/ 1 grab of HR    Composite: 1.53 0.91  1.23  1.42  3.06 w/ 1 grab of HR      Composite:  1.65 0.96   1.16   1.27   3.34     Composite: 1.68   1. 1.00  2. 1.33  3.  1.26  4. 3.39 >5 touches of hand rail  Composite:  1.74   0.61  0.90  1.02  2.70 w/ 2 grab of HR    Composite: 1.30 Deferred due to fatigue. Deferred due to fatigue levels   6MWT 1100ft 1275ft 1250ft 1LOB while turning  1300ft   1250ft  1190 1150ft 440ft @3:10 550ft @315 with significant imbalance, need for min A to restore balance on 2 occasions.   2MWT 375ft 440ft 400ft 450ft   410ft  375ft 400ft  300ft 350ft   DGI 19/24 21/24 21/24 21/24 18/24 17/24 16/24 9/24 9/24    ABC  70.6%  57.5%              Mini-BesTest      15/28 To be assessed next session              Precautions: Imbalance/Falls Risk    Access Code: [email or web address]       Insurance:  AMA/CMS Eval/ Re-eval Auth #/ Referral # Total units  Start date  Expiration date KX Visit limitation?  PT only or  PT+OT? Co-Insurance   CMS 11/4/24 NA    YES Prior 47 visits in 2024  100% after deduct    12/5/24                                                                         POC Start Date POC Expiration Date Signed POC?   11/4/24 12/30/24 Pend   12/5/24 1/30/25 Pend      Date 11/4 12/5             Visits/Units:  Used 1 2             Authed:  Remaining   RE                  Manuals  12/5 11/4     2 IE                     Neuro Re-Ed                                                                        Ther Ex      Education  Updated POC Reviewed POC   HEP      6 MWT  performed performed   5xSTS  performed performed   DGI  performed performed

## 2024-12-10 ENCOUNTER — OFFICE VISIT (OUTPATIENT)
Dept: PHYSICAL THERAPY | Facility: CLINIC | Age: 74
End: 2024-12-10
Payer: MEDICARE

## 2024-12-10 DIAGNOSIS — G62.9 NEUROPATHY: ICD-10-CM

## 2024-12-10 DIAGNOSIS — R26.9 GAIT DIFFICULTY: Primary | ICD-10-CM

## 2024-12-10 DIAGNOSIS — R26.89 IMBALANCE: ICD-10-CM

## 2024-12-10 PROCEDURE — 97112 NEUROMUSCULAR REEDUCATION: CPT | Performed by: PHYSICAL THERAPIST

## 2024-12-10 PROCEDURE — 97110 THERAPEUTIC EXERCISES: CPT | Performed by: PHYSICAL THERAPIST

## 2024-12-10 NOTE — PROGRESS NOTES
Daily Note     Today's date: 12/10/2024  Patient name: Sahara Patel  : 1950  MRN: [id number]  Referring provider: Rubi iBll MD  Dx:   Encounter Diagnosis     ICD-10-CM    1. Gait difficulty  R26.9       2. Imbalance  R26.89       3. Neuropathy  G62.9                      Subjective: Sahara Patel reports she saw specialist at [facility] yesterday for her thyroid/parathyroid.  She is scheduled for a follow up on 25 to determine her POC regarding potential surgery.  She states she was mentally and physically exhausted after her session.  She continues to be very unsteady when she is walking causing her to be very cautious.        Objective: See treatment diary below      Assessment: Tolerated treatment fair. Patient demonstrated fatigue post treatment with low endurance throughout session.  She continues with significant imbalance with ambulation, minimal foot clearance with sttempt of side stepping L>R.  She also demos imbalance with CS ROM in static stance.       Plan: Continue per plan of care.      Precautions: Imbalance/Falls Risk    Access Code: [email or web address]       Insurance:  AMA/CMS Eval/ Re-eval Auth #/ Referral # Total units  Start date  Expiration date KX Visit limitation?  PT only or  PT+OT? Co-Insurance   CMS 24 NA    YES Prior 47 visits in   100% after deduct    24                                                                         POC Start Date POC Expiration Date Signed POC?   24 Pend   24 Pend      Date 11/4 12/5 12/10            Visits/Units:  Used 1 2 3            Authed:  Remaining   RE                  Manuals 12/10 12/5 11/4    3 2 IE                     Neuro Re-Ed      Seated add iso 2x10 5s     Seated abd stab march 2x10 B     Romberg stance CS rot + flex/ext 10x ea at railing w/ CS     Side step at rail 5x10ft HR slight touch                                               Ther Ex      Education Reviewed updated from doctor's  visit Updated POC Reviewed POC   HEP      6 MWT  performed performed   5xSTS  performed performed   DGI  performed performed   Seated heel/toe raise 20x ea

## 2024-12-13 ENCOUNTER — OFFICE VISIT (OUTPATIENT)
Dept: PHYSICAL THERAPY | Facility: CLINIC | Age: 74
End: 2024-12-13
Payer: MEDICARE

## 2024-12-13 DIAGNOSIS — R26.9 GAIT DIFFICULTY: Primary | ICD-10-CM

## 2024-12-13 DIAGNOSIS — G62.9 NEUROPATHY: ICD-10-CM

## 2024-12-13 DIAGNOSIS — R26.89 IMBALANCE: ICD-10-CM

## 2024-12-13 PROCEDURE — 97110 THERAPEUTIC EXERCISES: CPT | Performed by: PHYSICAL THERAPIST

## 2024-12-13 PROCEDURE — 97112 NEUROMUSCULAR REEDUCATION: CPT | Performed by: PHYSICAL THERAPIST

## 2024-12-13 NOTE — PROGRESS NOTES
Daily Note     Today's date: 2024  Patient name: Sahara Patel  : 1950  MRN: [id number]  Referring provider: Rubi Bill MD  Dx:   Encounter Diagnosis     ICD-10-CM    1. Gait difficulty  R26.9       2. Imbalance  R26.89       3. Neuropathy  G62.9                      Subjective: Sahara Patel continues with complaint of global fatigue and endurance.  She also continues to report fear of falling and need for HHA by fiance for walking outdoors.       Objective: See treatment diary below      Assessment: Tolerated treatment well. Patient with low standing exercise endurance needing standing and seated rest breaks throughout session.  She also continues with need for close supervision during any balance activity due to high risk of falls.       Plan: Continue per plan of care.      Precautions: Imbalance/Falls Risk    Access Code: [email or web address]       Insurance:  A/CMS Eval/ Re-eval Auth #/ Referral # Total units  Start date  Expiration date KX Visit limitation?  PT only or  PT+OT? Co-Insurance   CMS 24 NA    YES Prior 47 visits in   100% after deduct    24                                                                         POC Start Date POC Expiration Date Signed POC?   24 Pend   24 Pend      Date 11/4 12/5 12/10 12/13           Visits/Units:  Used 1 2 3 4-KX           Authed:  Remaining   RE                  Manuals 12/13 12/10 12/5 11/4    4 3 2 IE                        Neuro Re-Ed       Seated add iso 2x10 5s 2x10 5s     Seated abd stab march  2x10 B     Romberg stance CS rot + flex/ext  10x ea at railing w/ CS     Side step at rail 5x10ft at railing - cues to reduce shuffling. 5x10ft HR slight touch     1/2 romberg stance 3x30s B w/ CS and railing      Clinic amb 3x30ft with need for cues to reduce shuffling.      SLS hip flex 1 HR 20x B                                  Ther Ex       Education Reviewed subjective reports Reviewed updated from  doctor's visit Updated POC Reviewed POC   HEP       6 MWT   performed performed   5xSTS   performed performed   DGI   performed performed   Seated heel/toe raise  20x ea     B Heel raise 2x10

## 2024-12-18 ENCOUNTER — OFFICE VISIT (OUTPATIENT)
Dept: PHYSICAL THERAPY | Facility: CLINIC | Age: 74
End: 2024-12-18
Payer: MEDICARE

## 2024-12-18 DIAGNOSIS — R26.89 IMBALANCE: ICD-10-CM

## 2024-12-18 DIAGNOSIS — R26.9 GAIT DIFFICULTY: Primary | ICD-10-CM

## 2024-12-18 DIAGNOSIS — G62.9 NEUROPATHY: ICD-10-CM

## 2024-12-18 PROCEDURE — 97112 NEUROMUSCULAR REEDUCATION: CPT | Performed by: PHYSICAL THERAPIST

## 2024-12-18 PROCEDURE — 97110 THERAPEUTIC EXERCISES: CPT | Performed by: PHYSICAL THERAPIST

## 2024-12-18 NOTE — PROGRESS NOTES
Daily Note     Today's date: 2024  Patient name: Sahara Patel  : 1950  MRN: [id number]  Referring provider: Rubi Bill MD  Dx:   Encounter Diagnosis     ICD-10-CM    1. Gait difficulty  R26.9       2. Imbalance  R26.89       3. Neuropathy  G62.9                      Subjective: Sahara Patel reports she was having to do a lot of moving around as her fiance was sick with pneumonia.  She is tired as a result.        Objective: See treatment diary below      Assessment: Tolerated treatment well. Patient demonstrated fatigue post treatment and would benefit from continued PT to continually improve upon endurance and balance reactions to decrease ROF.  She continues with significant fatigue and low endurance with all activity requiring rest breaks.      Plan: Continue per plan of care.      Precautions: Imbalance/Falls Risk    Access Code: [email or web address]       Insurance:  [place]/CMS Eval/ Re-eval Auth #/ Referral # Total units  Start date  Expiration date KX Visit limitation?  PT only or  PT+OT? Co-Insurance   CMS 24 NA    YES Prior 47 visits in   100% after deduct    24                                                                         POC Start Date POC Expiration Date Signed POC?   24 Pend   24 Pend      Date 11/4 12/5 12/10 12/13 12/18          Visits/Units:  Used 1 2 3 4-KX 5-KX          Authed:  Remaining   RE                  Manuals 12/18 12/13 12/10 12/5 11/4    5 4 3 2 IE                           Neuro Re-Ed        Seated add iso  2x10 5s 2x10 5s     Seated abd stab march 20x B  2x10 B     Romberg stance CS rot + flex/ext   10x ea at railing w/ CS     Side step at rail At table [id number] 5x10ft at railing - cues to reduce shuffling. 5x10ft HR slight touch     1/2 romberg stance  3x30s B w/ CS and railing      Clinic amb  3x30ft with need for cues to reduce shuffling.      SLS hip flex  1 HR 20x B      STS Chair + airex pad  1x10       Fwd/retro  step over 1/2 foam roll 1HR 1x15 B                       Ther Ex        Education  Reviewed subjective reports Reviewed updated from doctor's visit Updated POC Reviewed POC   HEP        6 MWT    performed performed   5xSTS    performed performed   DGI    performed performed   Seated heel/toe raise   20x ea     B Heel raise 2x10 2x10      Seated trunk rot 2x10       LAQ 20x B

## 2024-12-19 ENCOUNTER — OFFICE VISIT (OUTPATIENT)
Dept: PHYSICAL THERAPY | Facility: CLINIC | Age: 74
End: 2024-12-19
Payer: MEDICARE

## 2024-12-19 DIAGNOSIS — G62.9 NEUROPATHY: ICD-10-CM

## 2024-12-19 DIAGNOSIS — R26.89 IMBALANCE: ICD-10-CM

## 2024-12-19 DIAGNOSIS — R26.9 GAIT DIFFICULTY: Primary | ICD-10-CM

## 2024-12-19 PROCEDURE — 97112 NEUROMUSCULAR REEDUCATION: CPT | Performed by: PHYSICAL THERAPIST

## 2024-12-19 PROCEDURE — 97110 THERAPEUTIC EXERCISES: CPT | Performed by: PHYSICAL THERAPIST

## 2024-12-19 NOTE — PROGRESS NOTES
Daily Note     Today's date: 2024  Patient name: Sahara Patel  : 1950  MRN: [id number]  Referring provider: Rubi Bill MD  Dx:   Encounter Diagnosis     ICD-10-CM    1. Gait difficulty  R26.9       2. Imbalance  R26.89       3. Neuropathy  G62.9                      Subjective: Sahara Patel reports she felt a little sore in her thighs after yesterday's session.  She also has still been doing a lot while helping care for her fiance.  She states her fiance has been noticing her attempting to avoid shuffling while walking.        Objective: See treatment diary below      Assessment: Tolerated treatment well. Patient demonstrated fatigue post treatment, evidence of imbalance and poor mechanics with approaching chair and performing stand to sit transfer when fatigued.  She was able to improve mechanics and safety with consistent cues by PT.        Plan: Continue per plan of care.      Precautions: Imbalance/Falls Risk    Access Code: [email or web address]       Insurance:  [employer]/CMS Eval/ Re-eval Auth #/ Referral # Total units  Start date  Expiration date KX Visit limitation?  PT only or  PT+OT? Co-Insurance   CMS 24 NA    YES Prior 47 visits in   100% after deduct    24                                                                         POC Start Date POC Expiration Date Signed POC?   24 Pend   24 Pend      Date 11/4 12/5 12/10 12/13 12/18 12/19         Visits/Units:  Used 1 2 3 4-KX 5-KX 6-KX         Authed:  Remaining   RE                  Manuals 12/19 12/18 12/13 12/10 12/5    6 5 4 3 2                           Neuro Re-Ed        Seated add iso   2x10 5s 2x10 5s    Seated abd stab march 20x B 20x B  2x10 B    Romberg EC 3x30s close supervision       Romberg stance CS rot + flex/ext    10x ea at railing w/ CS    Side step at rail  At table [id number] 5x10ft at railing - cues to reduce shuffling. 5x10ft HR slight touch    1/2 romberg stance   3x30s B w/ CS and  "railing     Clinic amb   3x30ft with need for cues to reduce shuffling.     SLS hip flex   1 HR 20x B     STS 10x chair + airex pad Chair + airex pad  1x10      Fwd/retro step over 1/2 foam roll  1HR 1x15 B      Stagger stance CS rot Close supervision and near table  20x B       Lat step up/over 4\"  2x10 B at railing       Ther Ex        Education   Reviewed subjective reports Reviewed updated from doctor's visit Updated POC   HEP        6 MWT     performed   5xSTS     performed   DGI     performed   Seated heel/toe raise 20x   20x ea    B Heel raise  2x10 2x10     Seated trunk rot  2x10      Seated add iso 2x10 5s       LAQ  20x B                   "

## 2024-12-26 ENCOUNTER — OFFICE VISIT (OUTPATIENT)
Dept: PHYSICAL THERAPY | Facility: CLINIC | Age: 74
End: 2024-12-26
Payer: MEDICARE

## 2024-12-26 DIAGNOSIS — R26.89 IMBALANCE: ICD-10-CM

## 2024-12-26 DIAGNOSIS — G62.9 NEUROPATHY: ICD-10-CM

## 2024-12-26 DIAGNOSIS — R26.9 GAIT DIFFICULTY: Primary | ICD-10-CM

## 2024-12-26 PROCEDURE — 97112 NEUROMUSCULAR REEDUCATION: CPT | Performed by: PHYSICAL THERAPIST

## 2024-12-26 NOTE — PROGRESS NOTES
Daily Note     Today's date: 2024  Patient name: Sahara Paetl  : 1950  MRN: [id number]  Referring provider: Rubi Bill MD  Dx:   Encounter Diagnosis     ICD-10-CM    1. Gait difficulty  R26.9       2. Imbalance  R26.89       3. Neuropathy  G62.9                      Subjective: Sahara Patel reports she has been experiencing some neck pain over the past 2 days.  She also continues with her imbalance but has been working on staying active.      Objective: See treatment diary below      Assessment: Tolerated treatment well. Patient with initial CS pain with right rotation and return to neutral from ext.  She reported decreased pain following towel rot snags bilaterally and was added to her HEP.  She continues with low WB endurance and balance requiring frequent rest breaks and need for close supervision/HR support.      Plan: Continue per plan of care.      Precautions: Imbalance/Falls Risk    Access Code: [email or web address]       Insurance:  A/CMS Eval/ Re-eval Auth #/ Referral # Total units  Start date  Expiration date KX Visit limitation?  PT only or  PT+OT? Co-Insurance   CMS 24 NA    YES Prior 47 visits in   100% after deduct    24                                                                         POC Start Date POC Expiration Date Signed POC?   24 Pend   24 Pend      Date 11/4 12/5 12/10 12/13 12/18 12/19 12/26        Visits/Units:  Used 1 2 3 4-KX 5-KX 6-KX 7-KX        Authed:  Remaining   RE                  Manuals 12/26 12/19 12/18 12/13 12/10    7 6 5 4 3                           Neuro Re-Ed        Seated add iso    2x10 5s 2x10 5s   Seated abd stab march  20x B 20x B  2x10 B   Romberg EC  3x30s close supervision      Romberg stance CS rot + flex/ext     10x ea at railing w/ CS   Side step at rail   At table [id number] 5x10ft at railing - cues to reduce shuffling. 5x10ft HR slight touch   1/2 romberg stance    3x30s B w/ CS and railing   "  Clinic amb    3x30ft with need for cues to reduce shuffling.    Seated CS rot SNAGS 15x B + training       Fwd step up 2x10 B 6\" R-HR - TCs throughout for reduced valgus knee deviation       SLS hip flex 2x10 B w/1 HR   1 HR 20x B    STS  10x chair + airex pad Chair + airex pad  1x10     Fwd/retro step over 1/2 foam roll   1HR 1x15 B     Stagger stance CS rot  Close supervision and near table  20x B      Lat step up/over  4\"  2x10 B at railing      Ther Ex        Education    Reviewed subjective reports Reviewed updated from doctor's visit   HEP        6 MWT        5xSTS        DGI        Seated heel/toe raise  20x   20x ea   B Heel raise   2x10 2x10    Seated trunk rot   2x10     Seated add iso  2x10 5s      LAQ 20x B  20x B                    "

## 2024-12-30 ENCOUNTER — OFFICE VISIT (OUTPATIENT)
Dept: PHYSICAL THERAPY | Facility: CLINIC | Age: 74
End: 2024-12-30
Payer: MEDICARE

## 2024-12-30 DIAGNOSIS — R26.9 GAIT DIFFICULTY: Primary | ICD-10-CM

## 2024-12-30 DIAGNOSIS — G62.9 NEUROPATHY: ICD-10-CM

## 2024-12-30 DIAGNOSIS — R26.89 IMBALANCE: ICD-10-CM

## 2024-12-30 PROCEDURE — 97110 THERAPEUTIC EXERCISES: CPT | Performed by: PHYSICAL THERAPIST

## 2024-12-30 PROCEDURE — 97112 NEUROMUSCULAR REEDUCATION: CPT | Performed by: PHYSICAL THERAPIST

## 2024-12-30 NOTE — PROGRESS NOTES
"Daily Note     Today's date: 2024  Patient name: Sahara Patel  : 1950  MRN: [id number]  Referring provider: Rubi Bill MD  Dx:   Encounter Diagnosis     ICD-10-CM    1. Gait difficulty  R26.9       2. Imbalance  R26.89       3. Neuropathy  G62.9                      Subjective: Sahara Patel reports she feels a little more endurance with walking this week.  She also reports her neck felt much better after initiating her stretches.        Objective: See treatment diary below      Assessment: Tolerated treatment well. Patient demonstrated fatigue post treatment but able to improve more repetitions of weight bearing activity today but with continued need for seated rest breaks throughout session.       Plan: Continue per plan of care.      Precautions: Imbalance/Falls Risk    Access Code: [email or web address]       Insurance:  [place]/CMS Eval/ Re-eval Auth #/ Referral # Total units  Start date  Expiration date KX Visit limitation?  PT only or  PT+OT? Co-Insurance   CMS 24 NA    YES Prior 47 visits in   100% after deduct    24                                                                         POC Start Date POC Expiration Date Signed POC?   24 Pend   24 Pend      Date 11/4 12/5 12/10 12/13 12/18 12/19 12/26 12/30       Visits/Units:  Used 1 2 3 4-KX 5-KX 6-KX 7-KX 8-KX       Authed:  Remaining   RE                  Manuals     8 7 6 5 4                           Neuro Re-Ed        Seated add iso     2x10 5s   Seated abd stab march 2x10 B  20x B 20x B    Romberg EC   3x30s close supervision     Romberg stance CS rot + flex/ext        Side step at rail On airex beam  3x10ft  +  5x10ft   At table [id number] 5x10ft at railing - cues to reduce shuffling.   1/2 romberg stance     3x30s B w/ CS and railing   Clinic amb     3x30ft with need for cues to reduce shuffling.   Seated CS rot SNAGS  15x B + training      Fwd step up  2x10 B 6\" R-HR - " "TCs throughout for reduced valgus knee deviation      SLS hip flex 2x10 B w/ 1 HR 2x10 B w/1 HR   1 HR 20x B   STS 10x/5x from chair + airex  10x chair + airex pad Chair + airex pad  1x10    Fwd/retro step over 1/2 foam roll    1HR 1x15 B    Stagger stance CS rot   Close supervision and near table  20x B     Lat step up/over   4\"  2x10 B at railing     Ther Ex        Education     Reviewed subjective reports   HEP        6 MWT        5xSTS        DGI        Seated heel/toe raise   20x     B Heel raise    2x10 2x10   Seated trunk rot    2x10    Seated add iso   2x10 5s     LAQ 2x10 B 20x B  20x B                     "

## 2025-01-02 ENCOUNTER — OFFICE VISIT (OUTPATIENT)
Dept: PHYSICAL THERAPY | Facility: CLINIC | Age: 75
End: 2025-01-02
Payer: MEDICARE

## 2025-01-02 DIAGNOSIS — G62.9 NEUROPATHY: ICD-10-CM

## 2025-01-02 DIAGNOSIS — R26.9 GAIT DIFFICULTY: Primary | ICD-10-CM

## 2025-01-02 DIAGNOSIS — R26.89 IMBALANCE: ICD-10-CM

## 2025-01-02 PROCEDURE — 97110 THERAPEUTIC EXERCISES: CPT | Performed by: PHYSICAL THERAPIST

## 2025-01-02 PROCEDURE — 97112 NEUROMUSCULAR REEDUCATION: CPT | Performed by: PHYSICAL THERAPIST

## 2025-01-02 NOTE — PROGRESS NOTES
"Daily Note     Today's date: 2025  Patient name: Sahara Patel  : 1950  MRN: [id number]  Referring provider: Rubi Bill MD  Dx:   Encounter Diagnosis     ICD-10-CM    1. Gait difficulty  R26.9       2. Imbalance  R26.89       3. Neuropathy  G62.9                      Subjective: Sahara Patel reports she was fatigued after last session and a little sore.  She reports after she has been sitting for a prolonged period she has more trouble getting up and feels more unstable.      Objective: See treatment diary below      Assessment: Tolerated treatment well. Patient demonstrated fatigue post treatment and benefited from blaze pods as neurocognitive challenge combined with endurance and balance.        Plan: Continue per plan of care.      Precautions: Imbalance/Falls Risk    Access Code: [email or web address]       Insurance:  A/CMS Eval/ Re-eval Auth #/ Referral # Total units  Start date  Expiration date KX Visit limitation?  PT only or  PT+OT? Co-Insurance   CMS 24 NA    No Prior 47 visits in   100% after deduct    24                                                                         POC Start Date POC Expiration Date Signed POC?   24 Pend   24 Pend      Date 11/4 12/5 12/10 12/13 12/18 12/19 12/26 12/30 1/2      Visits/Units:  Used 1 2 3 4-KX 5-KX 6-KX 7-KX 8-KX 9      Authed:  Remaining   RE                  Manuals     9 8 7 6 5                           Neuro Re-Ed        Seated add iso        Seated abd stab march  2x10 B  20x B 20x B   Romberg EC    3x30s close supervision    Romberg stance CS rot + flex/ext        Side step at rail At table color catch blaze pods (4 pods)  2x60s  Focus reaction 1x60s L-Blue R-Red 3 distracting colors On airex beam  3x10ft  +  5x10ft   At table [id number]    romberg stance        Clinic amb        Seated CS rot SNAGS   15x B + training     Fwd step up   2x10 B 6\" R-HR - TCs throughout for " "reduced valgus knee deviation     SLS hip flex 2x10 B w/ 1HR 2x10 B w/ 1 HR 2x10 B w/1 HR     STS 10x (2 bouts)  Chair+airex 10x/5x from chair + airex  10x chair + airex pad Chair + airex pad  1x10   Fwd/retro step over 1/2 foam roll     1HR 1x15 B   Stagger stance CS rot    Close supervision and near table  20x B    Lat step up/over    4\"  2x10 B at railing    Ther Ex        Education        HEP        6 MWT        5xSTS        DGI        Seated heel/toe raise    20x    B Heel raise 2x10    2x10   Seated trunk rot     2x10   Seated add iso    2x10 5s    LAQ 2x10 B 2x10 B 20x B  20x B                      "

## 2025-01-06 ENCOUNTER — APPOINTMENT (OUTPATIENT)
Dept: PHYSICAL THERAPY | Facility: CLINIC | Age: 75
End: 2025-01-06
Payer: MEDICARE

## 2025-01-09 ENCOUNTER — EVALUATION (OUTPATIENT)
Dept: PHYSICAL THERAPY | Facility: CLINIC | Age: 75
End: 2025-01-09
Payer: MEDICARE

## 2025-01-09 DIAGNOSIS — G62.9 NEUROPATHY: ICD-10-CM

## 2025-01-09 DIAGNOSIS — R26.9 GAIT DIFFICULTY: Primary | ICD-10-CM

## 2025-01-09 DIAGNOSIS — R26.89 IMBALANCE: ICD-10-CM

## 2025-01-09 PROCEDURE — 97112 NEUROMUSCULAR REEDUCATION: CPT | Performed by: PHYSICAL THERAPIST

## 2025-01-09 PROCEDURE — 97110 THERAPEUTIC EXERCISES: CPT | Performed by: PHYSICAL THERAPIST

## 2025-01-09 NOTE — PROGRESS NOTES
Re-Eval     Today's date: 2025  Patient name: Sahara Patel  : 1950  MRN: [id number]  Referring provider: Rubi Bill MD  Dx:   Encounter Diagnosis     ICD-10-CM    1. Gait difficulty  R26.9       2. Imbalance  R26.89       3. Neuropathy  G62.9                      Assessment  Current assessment details: Sahara Patel presents for an re-evaluation at [facility] for her imbalance, fatigue and neuropathy.  Over the past month Sahara continues with fluctuations in her strength, balance, endurance that impacts her participation/tolerance level during therapy.  Overall she has been able to slowly increase her standing activity tolerance week over week.  Today however she presents as one of her worst days with significant imbalance, poor quality of gait with increased reliance on SPC and need for close supervision with all ambulation around clinic.  Her ability to perform STS today was also extremely impacted with inability to complete 5x STS without min assistance by PT on last 2 reps and time now >40 sec vs <30 sec and no need for assistance last evaluation.  Her Dynamic Gait Index score stayed steady at 12/24 indicating high risk of falls.  She also continues with evidence of difficulty coordinating movements and gait throughout walking for extended period such as 6MWT and when approaching objects such as a chair to sit.  These difficulties however do not match slightly improved LE MMT today pointing towards motor planning/neurologic component to deficits.  She is to follow up with specialist for her parathyroid next week which is to help determine her POC regarding potential upcoming surgery.  She will benefit from skilled therapy improve LE strength, quality of gait, balance and proprioceptive responses, endurance training, transfer training to improve her overall functional mobility and decrease risk of falls.    Impairments: abnormal gait, abnormal or restricted ROM, abnormal movement, activity  intolerance, impaired balance, impaired physical strength, lacks appropriate home exercise program, poor posture  and poor body mechanics  Understanding of Dx/Px/POC: good   Prognosis: good    Goals  STG 2-4 weeks: - Not Met  1. Improve 5xSTS score by >4 seconds indicating improve ability to transfer STS.   2. Pt will demonstrate increase DGI score by 1-2 points as evidence of decreased imbalance.    3. Pt will be able to complete 6 minute walk test    LTG 4-8 weeks: -   1. Pt will improve 6 minute walk test to >1000ft - Not Met  2. Pt will be I with comprehensive HEP. - Not Met  3. Improve hip strength by 1/2 grade in all deficient planes. - Met  4. Pt will demo >14/24 on dynamic gait index. - Not met      Plan  Plan details: HEP development, stretching, strengthening, A/AA/PROM, joint mobilizations, posture education, STM/MI as needed to reduce muscle tension, balance and proprioceptive training, muscle reeducation, PLOC discussed and agreed upon with patient.    Patient would benefit from: PT eval and skilled physical therapy  Planned modality interventions: cryotherapy and thermotherapy: hydrocollator packs  Planned therapy interventions: manual therapy, neuromuscular re-education, self care, therapeutic activities, therapeutic exercise and home exercise program  Frequency: 1-2x week  Duration in weeks: 8  Treatment plan discussed with: patient      Subjective Evaluation    History of Present Illness  Saahra Patel underwent procedure to remove half of thyroid and one parathyroid on 8/26/24.  During the procedure potential cancer was found in the thyroid and confirmed via biopsy.  She is to have further testing to determine if she will need to have parathyroid removed on opposite site and/or more of her thyroid.  She has also seen a neurologist specializing in movement and balance.  She did not see any evidence of PD, MS or any other conditions but contributes her increase in balance deficits due to her  "neuropathy, thryoid issues and diabetes.  She referred her back to [facility] at this time.    Since last being in therapy at this facility on 8/22/24.  Sahara Patel reports since then she feels significantly more fatigued, more imbalanced and having more difficulty getting around.  She states some days are better/worse than others.  She denies any falls in past month but has been more reliant on utilizing SPC for ambulation.  She has been avoiding walking outside on her own, relies on significant other to go with her for grocery shopping.  She also reports some days she is feeling \"foggy\" which is causing her to avoid driving.    Update 12/5/24:  Sahara Patel reports over the past month she has been experiencing significant fatigue that fluctuates day to day.  She states she is able to only do a couple things before she needs to sit and rest way more frequently than in the past.  She feels her balance is even worse forcing her to rely on leaning on shopping cart while in grocery store.  She was seen by her thyroid specialist who is now referring her to another specialist for thoracic surgery to remove her remaining thyroid and parathyroid due to placement of thyroid and continues signs of cancer.      Update 1/9/25:  Sahara Patel states she feels her endurance is a little better, she feels a little stronger over the past month.  She has been able to do more cooking for her family and not fatiguing as much as she had in the past.  Today however she feels more unstable and fatigued which she notices in her walking.  She is scheduled for follow up with specialist for her parathyroid this upcoming week to determine POC of about potential surgery.      Patient Goals  Patient goals for therapy: improved balance, increased strength and independence with ADLs/IADLs    Social Support  Steps to enter house: yes  4  Stairs in house: yes   4  Lives in: multiple-level home  Lives with: significant other    Employment " status: not working  Exercise history: Pilates - 1-2x/wk (not currently)      Objective     Postural Observations  Seated posture: fair  Standing posture: fair    Neurological Testing     Sensation     Lumbar   Left   Diminished: light touch    Right   Diminished: light touch    Additional Neurological Details  Evidence of 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     Strength/Myotome Testing     Left Hip   Planes of Motion   Flexion: 4+   External rotation: 4+  Internal rotation: 4+    Right Hip   Planes of Motion   Flexion: 4+  External rotation: 4  Internal rotation: 4    Left Knee   Flexion: 5  Extension: 5    Right Knee   Flexion: 4+  Extension: 5    Left Ankle/Foot   Dorsiflexion: 5  Plantar flexion: 5    Right Ankle/Foot   Dorsiflexion: 5  Plantar flexion: 5    Ambulation     Ambulation: Stairs   Ascend stairs: independent  Pattern: reciprocal  Railings: one rail  Descend stairs: independent  Pattern: reciprocal  Railings: one rail      Observational Gait     Patient utilizing SPC for ambulation.  She demonstrates decreased stride length and walking speed, limited bilateral foot clearance with shuffling of her feet, left LE externally rotated.  She also demonstrated LOB with pivoting/turning, difficulty initiating movement and uncontrolled changes in her pace due to signs of forward LOB.  Evidence of overall increased risk of falls and need for close supervision.      During 6 MWT and all functional assessments today Sahara demonstrated increased evidence of imbalance with need for cues to improve bilateral foot clearance, deviation side to side while walking in straight path, difficulty coordinating movements when turning and approaching chair or objects.  Need for extreme close supervision at all times.  Today was one of Sahara's worst days as other days she demonstrates decrease evidence of all the above with ability to ambulate safely without close supervision.       DGI  MDC: vestibular - 4 pts  MDC:  geriatric/community - 3 pts  Falls risk <19/24 5xSTS: Lucila et al 2010  MDC: 2.3 sec  Age Norms:  60-69: 11.1 sec  70-79: 12.6 sec  80-89: 14.8 sec   6 Minute Walk Test  Age Norms  60-69: M - 1876 ft (571.80 m)  F - 1765 ft (537.98 m)  70-79: M - 1729 ft (527.00 m)  F - 1545 ft (470.92 m)  80-89: M - 1368 ft (416.97 m)  F - 1286 ft (391.97 m) mCTSIB  Norm: 20-60 yrs  Eyes open firm: norm sway 0.21-0.48  Eyes closed firm: norm sway 0.48-0.99  Eyes open foam: norm sway 0.38-0.71  Eyes closed foam: norm sway 0.70-2.22       Outcome Measures 1/22/24   3/21/2024 4/22/2024  5/30/24  7/5/24 8/5/24 11/4/24 12/5/24 1/9/25   5xSTS 22.06s 15.44s  Trial 1: 23.43s  Trial 2: 19.09s    UE assist  22.36s no UE support  20.97s no UE assist 18.26 no UE assist  23.10 no UE assist - slight posterior LOB 17.87s no UE support - decreased evidence of posteror LOB 48.34s initially no UE support but need for min assist by PT on last rep to prevent posterior LOB   mCTSIB  - FTEO (firm)  - FTEC (firm)  - FTEO (foam)  - FTEC (foam) 0.94  1.28  1.38  2.75 w/ 1 grab of HR  Composite:1.58 0.91  1.23  1.42  3.06 w/ 1 grab of HR      Composite:  1.65 0.96   1.16   1.27   3.34     Composite: 1.68   1. 1.00  2. 1.33  3.  1.26  4. 3.39 >5 touches of hand rail  Composite:  1.74   0.61  0.90  1.02  2.70 w/ 2 grab of HR    Composite: 1.30 Deferred due to fatigue. Deferred due to fatigue levels Deferred due to fatigue levels   6MWT 1100ft 1250ft 1LOB while turning  1300ft   1250ft  1190 1150ft 440ft @3:10 550ft @315 with significant imbalance, need for min A to restore balance on 2 occasions. 600ft in 5 min prior to stopping test - need for close supervision throughout due to evidence of imbalance   2MWT 375ft 400ft 450ft   410ft  375ft 400ft  300ft 350ft 275ft   DGI 19/24 21/24 21/24 18/24 17/24 16/24 9/24 9/24 9/24    ABC  70.6%               [employer]     15/28              Precautions: Imbalance/Falls Risk    Access Code: [email or web address]        "Insurance:  AMA/CMS Eval/ Re-eval Auth #/ Referral # Total units  Start date  Expiration date KX Visit limitation?  PT only or  PT+OT? Co-Insurance   CMS 11/4/24 NA    No Prior 47 visits in 2024  100% after deduct    12/5/24 1/9/25                                                             POC Start Date POC Expiration Date Signed POC?   11/4/24 12/30/24 Pend   12/5/24 1/30/25 Pend      Date 11/4 12/5 12/10 12/13 12/18 12/19 12/26 12/30 1/2 1/9     Visits/Units:  Used 1 2 3 4-KX 5-KX 6-KX 7-KX 8-KX 9 10     Authed:  Remaining   RE        RE          Manuals 1/9 1/2 12/30 12/26 12/19    10 9 8 7 6                           Neuro Re-Ed        Seated add iso        Seated abd stab march 2x10  2x10 B  20x B   Romberg EC     3x30s close supervision   Romberg stance CS rot + flex/ext        Side step at rail At table  35x27fi close supervision and hand on table At table color catch blaze pods (4 pods)  2x60s  Focus reaction 1x60s L-Blue R-Red 3 distracting colors On airex beam  3x10ft  +  5x10ft     1/2 romberg stance        Clinic amb        Seated CS rot SNAGS    15x B + training    Fwd step up    2x10 B 6\" R-HR - TCs throughout for reduced valgus knee deviation    SLS hip flex  2x10 B w/ 1HR 2x10 B w/ 1 HR 2x10 B w/1 HR    STS  10x (2 bouts)  Chair+airex 10x/5x from chair + airex  10x chair + airex pad   Fwd/retro step over 1/2 foam roll        Stagger stance CS rot     Close supervision and near table  20x B   Lat step up/over     4\"  2x10 B at railing   Ther Ex        Education Reviewed updated POC       HEP        6 MWT performed       5xSTS performed       DGI performed       Seated heel/toe raise 20x    20x   B Heel raise  2x10      Seated trunk rot        Seated add iso     2x10 5s   LAQ  2x10 B 2x10 B 20x B                         "

## 2025-01-09 NOTE — LETTER
2025    Rubi Bill MD  [address]  # [facility]  [place] [id number]    Patient: Sahara Patel   YOB: 1950   Date of Visit: 2025     Encounter Diagnosis     ICD-10-CM    1. Gait difficulty  R26.9       2. Imbalance  R26.89       3. Neuropathy  G62.9           Dear Dr. Bill:    Thank you for your recent referral of Sahara Patel. Please review the attached evaluation summary from Sahara's recent visit.     Please verify that you agree with the plan of care by signing the attached order.     If you have any questions or concerns, please do not hesitate to call.     I sincerely appreciate the opportunity to share in the care of one of your patients and hope to have another opportunity to work with you in the near future.       Sincerely,    Devon Rolbes, PT      Referring Provider:      I certify that I have read the below Plan of Care and certify the need for these services furnished under this plan of treatment while under my care.                    Rubi Bill MD  [address]  # [facility]  [facility] [id number]  Via Mail          Re-Eval     Today's date: 2025  Patient name: Sahara Patel  : 1950  MRN: [id number]  Referring provider: Rubi Bill MD  Dx:   Encounter Diagnosis     ICD-10-CM    1. Gait difficulty  R26.9       2. Imbalance  R26.89       3. Neuropathy  G62.9                      Assessment  Current assessment details: Sahara Patel presents for an re-evaluation at [facility] for her imbalance, fatigue and neuropathy.  Over the past month Sahara continues with fluctuations in her strength, balance, endurance that impacts her participation/tolerance level during therapy.  Overall she has been able to slowly increase her standing activity tolerance week over week.  Today however she presents as one of her worst days with significant imbalance, poor quality of gait with increased reliance on SPC and need for close supervision with all ambulation  around clinic.  Her ability to perform STS today was also extremely impacted with inability to complete 5x STS without min assistance by PT on last 2 reps and time now >40 sec vs <30 sec and no need for assistance last evaluation.  Her Dynamic Gait Index score stayed steady at 12/24 indicating high risk of falls.  She also continues with evidence of difficulty coordinating movements and gait throughout walking for extended period such as 6MWT and when approaching objects such as a chair to sit.  These difficulties however do not match slightly improved LE MMT today pointing towards motor planning/neurologic component to deficits.  She is to follow up with specialist for her parathyroid next week which is to help determine her POC regarding potential upcoming surgery.  She will benefit from skilled therapy improve LE strength, quality of gait, balance and proprioceptive responses, endurance training, transfer training to improve her overall functional mobility and decrease risk of falls.    Impairments: abnormal gait, abnormal or restricted ROM, abnormal movement, activity intolerance, impaired balance, impaired physical strength, lacks appropriate home exercise program, poor posture  and poor body mechanics  Understanding of Dx/Px/POC: good   Prognosis: good    Goals  STG 2-4 weeks: - Not Met  1. Improve 5xSTS score by >4 seconds indicating improve ability to transfer STS.   2. Pt will demonstrate increase DGI score by 1-2 points as evidence of decreased imbalance.    3. Pt will be able to complete 6 minute walk test    LTG 4-8 weeks: -   1. Pt will improve 6 minute walk test to >1000ft - Not Met  2. Pt will be I with comprehensive HEP. - Not Met  3. Improve hip strength by 1/2 grade in all deficient planes. - Met  4. Pt will demo >14/24 on dynamic gait index. - Not met      Plan  Plan details: HEP development, stretching, strengthening, A/AA/PROM, joint mobilizations, posture education, STM/MI as needed to reduce  "muscle tension, balance and proprioceptive training, muscle reeducation, PLOC discussed and agreed upon with patient.    Patient would benefit from: PT eval and skilled physical therapy  Planned modality interventions: cryotherapy and thermotherapy: hydrocollator packs  Planned therapy interventions: manual therapy, neuromuscular re-education, self care, therapeutic activities, therapeutic exercise and home exercise program  Frequency: 1-2x week  Duration in weeks: 8  Treatment plan discussed with: patient      Subjective Evaluation    History of Present Illness  Sahara Patel underwent procedure to remove half of thyroid and one parathyroid on 8/26/24.  During the procedure potential cancer was found in the thyroid and confirmed via biopsy.  She is to have further testing to determine if she will need to have parathyroid removed on opposite site and/or more of her thyroid.  She has also seen a neurologist specializing in movement and balance.  She did not see any evidence of PD, MS or any other conditions but contributes her increase in balance deficits due to her neuropathy, thryoid issues and diabetes.  She referred her back to [facility] at this time.    Since last being in therapy at this facility on 8/22/24.  Sahara Patel reports since then she feels significantly more fatigued, more imbalanced and having more difficulty getting around.  She states some days are better/worse than others.  She denies any falls in past month but has been more reliant on utilizing SPC for ambulation.  She has been avoiding walking outside on her own, relies on significant other to go with her for grocery shopping.  She also reports some days she is feeling \"foggy\" which is causing her to avoid driving.    Update 12/5/24:  Sahara Patel reports over the past month she has been experiencing significant fatigue that fluctuates day to day.  She states she is able to only do a couple things before she needs to sit and rest way " more frequently than in the past.  She feels her balance is even worse forcing her to rely on leaning on shopping cart while in grocery store.  She was seen by her thyroid specialist who is now referring her to another specialist for thoracic surgery to remove her remaining thyroid and parathyroid due to placement of thyroid and continues signs of cancer.      Update 1/9/25:  Sahara Patel states she feels her endurance is a little better, she feels a little stronger over the past month.  She has been able to do more cooking for her family and not fatiguing as much as she had in the past.  Today however she feels more unstable and fatigued which she notices in her walking.  She is scheduled for follow up with specialist for her parathyroid this upcoming week to determine POC of about potential surgery.      Patient Goals  Patient goals for therapy: improved balance, increased strength and independence with ADLs/IADLs    Social Support  Steps to enter house: yes  4  Stairs in house: yes   4  Lives in: multiple-level home  Lives with: significant other    Employment status: not working  Exercise history: Pilates - 1-2x/wk (not currently)      Objective     Postural Observations  Seated posture: fair  Standing posture: fair    Neurological Testing     Sensation     Lumbar   Left   Diminished: light touch    Right   Diminished: light touch    Additional Neurological Details  Evidence of 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     Strength/Myotome Testing     Left Hip   Planes of Motion   Flexion: 4+   External rotation: 4+  Internal rotation: 4+    Right Hip   Planes of Motion   Flexion: 4+  External rotation: 4  Internal rotation: 4    Left Knee   Flexion: 5  Extension: 5    Right Knee   Flexion: 4+  Extension: 5    Left Ankle/Foot   Dorsiflexion: 5  Plantar flexion: 5    Right Ankle/Foot   Dorsiflexion: 5  Plantar flexion: 5    Ambulation     Ambulation: Stairs   Ascend stairs: independent  Pattern:  reciprocal  Railings: one rail  Descend stairs: independent  Pattern: reciprocal  Railings: one rail      Observational Gait     Patient utilizing SPC for ambulation.  She demonstrates decreased stride length and walking speed, limited bilateral foot clearance with shuffling of her feet, left LE externally rotated.  She also demonstrated LOB with pivoting/turning, difficulty initiating movement and uncontrolled changes in her pace due to signs of forward LOB.  Evidence of overall increased risk of falls and need for close supervision.      During 6 MWT and all functional assessments today Sahara demonstrated increased evidence of imbalance with need for cues to improve bilateral foot clearance, deviation side to side while walking in straight path, difficulty coordinating movements when turning and approaching chair or objects.  Need for extreme close supervision at all times.  Today was one of Sahara's worst days as other days she demonstrates decrease evidence of all the above with ability to ambulate safely without close supervision.       DGI  MDC: vestibular - 4 pts  MDC: geriatric/community - 3 pts  Falls risk <19/24 5xSTS: Lucila et al 2010  MDC: 2.3 sec  Age Norms:  60-69: 11.1 sec  70-79: 12.6 sec  80-89: 14.8 sec   6 Minute Walk Test  Age Norms  60-69: M - 1876 ft (571.80 m)  F - 1765 ft (537.98 m)  70-79: M - 1729 ft (527.00 m)  F - 1545 ft (470.92 m)  80-89: M - 1368 ft (416.97 m)  F - 1286 ft (391.97 m) mCTSIB  Norm: 20-60 yrs  Eyes open firm: norm sway 0.21-0.48  Eyes closed firm: norm sway 0.48-0.99  Eyes open foam: norm sway 0.38-0.71  Eyes closed foam: norm sway 0.70-2.22       Outcome Measures 1/22/24   3/21/2024 4/22/2024  5/30/24  7/5/24 8/5/24 11/4/24 12/5/24 1/9/25   5xSTS 22.06s 15.44s  Trial 1: 23.43s  Trial 2: 19.09s    UE assist  22.36s no UE support  20.97s no UE assist 18.26 no UE assist  23.10 no UE assist - slight posterior LOB 17.87s no UE support - decreased evidence of posteror LOB  48.34s initially no UE support but need for min assist by PT on last rep to prevent posterior LOB   mCTSIB  - FTEO (firm)  - FTEC (firm)  - FTEO (foam)  - FTEC (foam) 0.94  1.28  1.38  2.75 w/ 1 grab of HR  Composite:1.58 0.91  1.23  1.42  3.06 w/ 1 grab of HR      Composite:  1.65 0.96   1.16   1.27   3.34     Composite: 1.68   1. 1.00  2. 1.33  3.  1.26  4. 3.39 >5 touches of hand rail  Composite:  1.74   0.61  0.90  1.02  2.70 w/ 2 grab of HR    Composite: 1.30 Deferred due to fatigue. Deferred due to fatigue levels Deferred due to fatigue levels   6MWT 1100ft 1250ft 1LOB while turning  1300ft   1250ft  1190 1150ft 440ft @3:10 550ft @315 with significant imbalance, need for min A to restore balance on 2 occasions. 600ft in 5 min prior to stopping test - need for close supervision throughout due to evidence of imbalance   2MWT 375ft 400ft 450ft   410ft  375ft 400ft  300ft 350ft 275ft   DGI 19/24 21/24 21/24 18/24 17/24 16/24 9/24 9/24 9/24    ABC  70.6%               Mini-BesTest     15/28              Precautions: Imbalance/Falls Risk    Access Code: [email or web address]       Insurance:  AMA/CMS Eval/ Re-eval Auth #/ Referral # Total units  Start date  Expiration date KX Visit limitation?  PT only or  PT+OT? Co-Insurance   CMS 11/4/24 NA    No Prior 47 visits in 2024  100% after deduct    12/5/24 1/9/25                                                             POC Start Date POC Expiration Date Signed POC?   11/4/24 12/30/24 Pend   12/5/24 1/30/25 Pend      Date 11/4 12/5 12/10 12/13 12/18 12/19 12/26 12/30 1/2 1/9     Visits/Units:  Used 1 2 3 4-KX 5-KX 6-KX 7-KX 8-KX 9 10     Authed:  Remaining   RE        RE          Manuals 1/9 1/2 12/30 12/26 12/19    10 9 8 7 6                           Neuro Re-Ed        Seated add iso        Seated abd stab march 2x10  2x10 B  20x B   Romberg EC     3x30s close supervision   Romberg stance CS rot + flex/ext        Side step at rail At table  [id number] close supervision  "and hand on table At table color catch blaze pods (4 pods)  2x60s  Focus reaction 1x60s L-Blue R-Red 3 distracting colors On airex beam  3x10ft  +  5x10ft     1/2 romberg stance        Clinic amb        Seated CS rot SNAGS    15x B + training    Fwd step up    2x10 B 6\" R-HR - TCs throughout for reduced valgus knee deviation    SLS hip flex  2x10 B w/ 1HR 2x10 B w/ 1 HR 2x10 B w/1 HR    STS  10x (2 bouts)  Chair+airex 10x/5x from chair + airex  10x chair + airex pad   Fwd/retro step over 1/2 foam roll        Stagger stance CS rot     Close supervision and near table  20x B   Lat step up/over     4\"  2x10 B at railing   Ther Ex        Education Reviewed updated POC       HEP        6 MWT performed       5xSTS performed       DGI performed       Seated heel/toe raise 20x    20x   B Heel raise  2x10      Seated trunk rot        Seated add iso     2x10 5s   LAQ  2x10 B 2x10 B 20x B                                       "

## 2025-01-13 ENCOUNTER — APPOINTMENT (OUTPATIENT)
Dept: PHYSICAL THERAPY | Facility: CLINIC | Age: 75
End: 2025-01-13
Payer: MEDICARE

## 2025-01-16 ENCOUNTER — OFFICE VISIT (OUTPATIENT)
Dept: PHYSICAL THERAPY | Facility: CLINIC | Age: 75
End: 2025-01-16
Payer: MEDICARE

## 2025-01-16 DIAGNOSIS — R26.9 GAIT DIFFICULTY: Primary | ICD-10-CM

## 2025-01-16 DIAGNOSIS — G62.9 NEUROPATHY: ICD-10-CM

## 2025-01-16 DIAGNOSIS — R26.89 IMBALANCE: ICD-10-CM

## 2025-01-16 PROCEDURE — 97110 THERAPEUTIC EXERCISES: CPT | Performed by: PHYSICAL THERAPIST

## 2025-01-16 PROCEDURE — 97112 NEUROMUSCULAR REEDUCATION: CPT | Performed by: PHYSICAL THERAPIST

## 2025-01-16 NOTE — PROGRESS NOTES
Daily Note     Today's date: 2025  Patient name: Sahara Patel  : 1950  MRN: [id number]  Referring provider: Rubi Bill MD  Dx:   Encounter Diagnosis     ICD-10-CM    1. Gait difficulty  R26.9       2. Imbalance  R26.89       3. Neuropathy  G62.9                      Subjective: Sahara Patel reports she had a lot of sitting while shopping and also going to doctors appointments earlier in the week which caused an onset of neck pain.  She went to her specialist for her thyroid who is sending her for further diagnostic testing prior to determining what procedure needs to be completed.  He did say that her continued issues with fatigue an balance may be related to her thyroid/parathyroid.      Objective: See treatment diary below      Assessment: Tolerated treatment well. Patient with need for direction on proper performance of CS rot SNAGS but did report decreased pain following.  She also demos improved balance and WB tolerance today vs last session in which she was severely limited.      Plan: Continue per plan of care.      Precautions: Imbalance/Falls Risk    Access Code: [email or web address]       Insurance:  AMA/CMS Eval/ Re-eval Auth #/ Referral # Total units  Start date  Expiration date KX Visit limitation?  PT only or  PT+OT? Co-Insurance   CMS 24 NA    No Prior 47 visits in   100% after deduct    24                                                             POC Start Date POC Expiration Date Signed POC?   24 Pend   24 Pend      Date 11/4 12/5 12/10 12/13 12/18 12/19 12/26 12/30 1/2 1/9 1/16    Visits/Units:  Used 1 2 3 4-KX 5-KX 6-KX 7-KX 8-KX 9 10 11    Authed:  Remaining   RE        RE          Manuals     11 10 9 8 7                           Neuro Re-Ed        Seated add iso        Seated abd stab march 2x10 2x10  2x10 B    Romberg EC        Romberg stance CS rot + flex/ext        Side step at rail At table  "[id number] B CS At table  [id number] close supervision and hand on table At table color catch blaze pods (4 pods)  2x60s  Focus reaction 1x60s L-Blue R-Red 3 distracting colors On airex beam  3x10ft  +  5x10ft    1/2 romberg stance        CS retr 15x       Seated CS rot SNAGS 15x B + training    15x B + training   Fwd step up     2x10 B 6\" R-HR - TCs throughout for reduced valgus knee deviation   SLS hip flex 2x10 B 1 HR  2x10 B w/ 1HR 2x10 B w/ 1 HR 2x10 B w/1 HR   STS Chair + airex  10x  10x (2 bouts)  Chair+airex 10x/5x from chair + airex    Fwd/retro step over 1/2 foam roll        Stagger stance CS rot        Lat step up/over        Ther Ex        Education Reviewed findings of physician appt Reviewed updated POC      HEP        6 MWT  performed      5xSTS  performed      DGI  performed      Seated heel/toe raise  20x      B Heel raise   2x10     Seated trunk rot        Seated add iso        LAQ   2x10 B 2x10 B 20x B                          "

## 2025-01-20 ENCOUNTER — APPOINTMENT (OUTPATIENT)
Dept: PHYSICAL THERAPY | Facility: CLINIC | Age: 75
End: 2025-01-20
Payer: MEDICARE

## 2025-01-23 ENCOUNTER — OFFICE VISIT (OUTPATIENT)
Dept: PHYSICAL THERAPY | Facility: CLINIC | Age: 75
End: 2025-01-23
Payer: MEDICARE

## 2025-01-23 DIAGNOSIS — R26.89 IMBALANCE: ICD-10-CM

## 2025-01-23 DIAGNOSIS — G62.9 NEUROPATHY: ICD-10-CM

## 2025-01-23 DIAGNOSIS — R26.9 GAIT DIFFICULTY: Primary | ICD-10-CM

## 2025-01-23 PROCEDURE — 97112 NEUROMUSCULAR REEDUCATION: CPT | Performed by: PHYSICAL THERAPIST

## 2025-01-23 NOTE — PROGRESS NOTES
Daily Note     Today's date: 2025  Patient name: Sahara Patel  : 1950  MRN: [id number]  Referring provider: Rubi Bill MD  Dx:   Encounter Diagnosis     ICD-10-CM    1. Gait difficulty  R26.9       2. Imbalance  R26.89       3. Neuropathy  G62.9                      Subjective: Sahara Patel states she was able to do a little more this week.  She had to help care for her grandkids which made her be more active.        Objective: See treatment diary below      Assessment: Tolerated treatment well. Patient with improved activity tolerance and endurance today vs prior sessions.  She continues need for cues to improve step lengths and mechanics when negotiating close to chair and then sit to stand due to tendency for shuffling of feet and reaching outside of YOLI increasing risk of falls.        Plan: Continue per plan of care.      Precautions: Imbalance/Falls Risk    Access Code: [email or web address]       Insurance:  A/CMS Eval/ Re-eval Auth #/ Referral # Total units  Start date  Expiration date KX Visit limitation?  PT only or  PT+OT? Co-Insurance   CMS 24 NA    No Prior 47 visits in   100% after deduct    24                                                             POC Start Date POC Expiration Date Signed POC?   24 Pend   24 Pend      Date 11/4 12/5 12/10 12/13 12/18 12/19 12/26 12/30 1/2 1/9 1/16 1/23   Visits/Units:  Used 1 2 3 4-KX 5-KX 6-KX 7-KX 8-KX 9 10 11 12   Authed:  Remaining   RE        RE          Manuals     12 11 10 9 8                           Neuro Re-Ed        Seated add iso        Seated abd stab march  2x10 2x10  2x10 B   Romberg EC        Romberg stance CS rot + flex/ext        Side step at rail  At table [id number] B CS At table  [id number] close supervision and hand on table At table color catch blaze pods (4 pods)  2x60s  Focus reaction 1x60s L-Blue R-Red 3 distracting colors On airex beam  3x10ft  " +  5x10ft   1/2 romberg stance        CS retr  15x      Seated CS rot SNAGS 15x B 15x B + training      Fwd step up 8\" 2x10 contra HR close supervision       SLS hip flex 2x15 B 1 HR 2x10 B 1 HR  2x10 B w/ 1HR 2x10 B w/ 1 HR   STS 2x10 Chair + airex Chair + airex  10x  10x (2 bouts)  Chair+airex 10x/5x from chair + airex   Seated abd stab hip add iso 2x10 5s       Stagger stance CS rot        Lat step up/over        Ther Ex        Education  Reviewed findings of physician appt Reviewed updated POC     HEP        6 MWT   performed     5xSTS   performed     DGI   performed     Seated heel/toe raise   20x     B Heel raise    2x10    Seated trunk rot        Seated add iso        LAQ    2x10 B 2x10 B                            "

## 2025-01-27 ENCOUNTER — OFFICE VISIT (OUTPATIENT)
Dept: PHYSICAL THERAPY | Facility: CLINIC | Age: 75
End: 2025-01-27
Payer: MEDICARE

## 2025-01-27 DIAGNOSIS — G62.9 NEUROPATHY: ICD-10-CM

## 2025-01-27 DIAGNOSIS — R26.89 IMBALANCE: ICD-10-CM

## 2025-01-27 DIAGNOSIS — R26.9 GAIT DIFFICULTY: Primary | ICD-10-CM

## 2025-01-27 PROCEDURE — 97112 NEUROMUSCULAR REEDUCATION: CPT | Performed by: PHYSICAL THERAPIST

## 2025-01-27 NOTE — PROGRESS NOTES
Daily Note     Today's date: 2025  Patient name: Sahara Patel  : 1950  MRN: [id number]  Referring provider: Rubi Bill MD  Dx:   Encounter Diagnosis     ICD-10-CM    1. Gait difficulty  R26.9       2. Imbalance  R26.89       3. Neuropathy  G62.9                      Subjective: Sahara Patel reports she was taking a new medication for anxiety this weekend and it caused her to feel more unstable both Saturday and .  She stopped taking medication and feels a little better today.  She is also being sent for MRI of her brain from her neurologist.        Objective: See treatment diary below      Assessment: Tolerated treatment well. Patient presented ambulating with increased evidence of imbalance with wide YOLI, shuffling of feet and poor utilization of SPC.  Pt with improved step length and reduced shuffling with training and repetition when approaching chair to help reduce evidence of imbalance.  She requires consistent verbal cues to feedback.        Plan: Continue per plan of care.      Precautions: Imbalance/Falls Risk    Access Code: [email or web address]       Insurance:  A/CMS Eval/ Re-eval Auth #/ Referral # Total units  Start date  Expiration date KX Visit limitation?  PT only or  PT+OT? Co-Insurance   CMS 24 NA    No Prior 47 visits in   100% after deduct    24                                                             POC Start Date POC Expiration Date Signed POC?   24 Pend   24 Pend      Date 11/4 12/5 12/10 12/13 12/18 12/19 12/26 12/30 1/2 1/9 1/16 1/23   Visits/Units:  Used 1 2 3 4-KX 5-KX 6-KX 7-KX 8-KX 9 10 11 12   Authed:  Remaining   RE        RE         Date               Visits/Units:  Used 13              Authed:  Remaining                   Manuals     13 12 11 10 9                           Neuro Re-Ed        Seated add iso        Seated abd stab march   2x10 2x10    Romberg EC       "  Fwd/retro at railing VCs to improve step length  [id number] ea       Side step at rail Over circular cones (4)  5x15ft B  At table 23e22sx B CS At table  [id number] close supervision and hand on table At table color catch blaze pods (4 pods)  2x60s  Focus reaction 1x60s L-Blue R-Red 3 distracting colors   1/2 romberg stance        CS retr   15x     Seated CS rot SNAGS  15x B 15x B + training     Fwd step up 8\" 2x10 1 HR   8\" 2x10 contra HR close supervision      SLS hip flex  2x15 B 1 HR 2x10 B 1 HR  2x10 B w/ 1HR   STS 2x10 Chair + airex 2x10 Chair + airex Chair + airex  10x  10x (2 bouts)  Chair+airex   Seated abd stab hip add iso  2x10 5s      TUG 5x with focus on large steps when approaching chair and control with STS               Ther Ex        Education   Reviewed findings of physician appt Reviewed updated POC    HEP        6 MWT    performed    5xSTS    performed    DGI    performed    Seated heel/toe raise    20x    B Heel raise     2x10   Seated trunk rot        Seated add iso        LAQ     2x10 B                              "

## 2025-01-30 ENCOUNTER — OFFICE VISIT (OUTPATIENT)
Dept: PHYSICAL THERAPY | Facility: CLINIC | Age: 75
End: 2025-01-30
Payer: MEDICARE

## 2025-01-30 DIAGNOSIS — R26.89 IMBALANCE: ICD-10-CM

## 2025-01-30 DIAGNOSIS — R26.9 GAIT DIFFICULTY: Primary | ICD-10-CM

## 2025-01-30 DIAGNOSIS — G62.9 NEUROPATHY: ICD-10-CM

## 2025-01-30 PROCEDURE — 97110 THERAPEUTIC EXERCISES: CPT | Performed by: PHYSICAL THERAPIST

## 2025-01-30 PROCEDURE — 97112 NEUROMUSCULAR REEDUCATION: CPT | Performed by: PHYSICAL THERAPIST

## 2025-01-30 NOTE — PROGRESS NOTES
Daily Note     Today's date: 2025  Patient name: Sahara Patel  : 1950  MRN: [id number]  Referring provider: Rubi Bill MD  Dx:   Encounter Diagnosis     ICD-10-CM    1. Gait difficulty  R26.9       2. Imbalance  R26.89       3. Neuropathy  G62.9                      Subjective: Sahara Patel reports she has been feeler better last couple days with little more balance and less tired.        Objective: See treatment diary below      Assessment: Tolerated treatment well. Patient demonstrated fatigue post treatment but demod improved LE strength endurance today with ability to perform STS from standard height chair and no UE assistance, prolonged standing activity and decreased evidence of shuffling of feet throughout session.      Plan: Continue per plan of care.      Precautions: Imbalance/Falls Risk    Access Code: [email or web address]       Insurance:  A/CMS Eval/ Re-eval Auth #/ Referral # Total units  Start date  Expiration date KX Visit limitation?  PT only or  PT+OT? Co-Insurance   CMS 24 NA    No Prior 47 visits in   100% after deduct    24                                                             POC Start Date POC Expiration Date Signed POC?   24 Pend   24 Pend      Date 11/4 12/5 12/10 12/13 12/18 12/19 12/26 12/30 1/2 1/9 1/16 1/23   Visits/Units:  Used 1 2 3 4-KX 5-KX 6-KX 7-KX 8-KX 9 10 11 12   Authed:  Remaining   RE        RE         Date              Visits/Units:  Used 13 14             Authed:  Remaining                   Manuals     14 13 12 11 10                           Neuro Re-Ed        Seated add iso        Seated abd stab march 10x B 3lb   2x10 2x10   Fwd modified lunge at rail 10x B close supervision       Fwd/retro at railing  VCs to improve step length  [id number] ea      Side step at rail  Over circular cones (4)  5x15ft B  At table [id number] B CS At table  [id number] close supervision  "and hand on table   1/2 romberg stance        CS retr    15x    Seated CS rot SNAGS   15x B 15x B + training    Fwd step up  8\" 2x10 1 HR   8\" 2x10 contra HR close supervision     SLS hip flex   2x15 B 1 HR 2x10 B 1 HR    STS No airex  3x5 2x10 Chair + airex 2x10 Chair + airex Chair + airex  10x    Seated abd stab hip add iso   2x10 5s     TUG  5x with focus on large steps when approaching chair and control with STS              Ther Ex        Education    Reviewed findings of physician appt Reviewed updated POC   HEP        6 MWT     performed   5xSTS     performed   DGI     performed   Seated heel/toe raise     20x   Harshil str 5x10s B seated       Seated trunk rot        Seated add iso        LAQ 2x10 B 3lbs                                    "

## 2025-02-03 ENCOUNTER — OFFICE VISIT (OUTPATIENT)
Dept: PHYSICAL THERAPY | Facility: CLINIC | Age: 75
End: 2025-02-03
Payer: MEDICARE

## 2025-02-03 DIAGNOSIS — R26.9 GAIT DIFFICULTY: Primary | ICD-10-CM

## 2025-02-03 DIAGNOSIS — R26.89 IMBALANCE: ICD-10-CM

## 2025-02-03 DIAGNOSIS — G62.9 NEUROPATHY: ICD-10-CM

## 2025-02-03 PROCEDURE — 97110 THERAPEUTIC EXERCISES: CPT | Performed by: PHYSICAL THERAPIST

## 2025-02-03 PROCEDURE — 97112 NEUROMUSCULAR REEDUCATION: CPT | Performed by: PHYSICAL THERAPIST

## 2025-02-03 NOTE — PROGRESS NOTES
Daily Note     Today's date: 2/3/2025  Patient name: Sahara Patel  : 1950  MRN: [id number]  Referring provider: Rubi Bill MD  Dx:   Encounter Diagnosis     ICD-10-CM    1. Gait difficulty  R26.9       2. Imbalance  R26.89       3. Neuropathy  G62.9                      Subjective: Sahara Patel reports Friday she went to [facility] for biopsy but was mentally and physically fatigued by end of day.  She had to utilize the WC while at the hospital due to the amount of long distance walking.  She was tired throughout the rest of the weekend and still feels a little weak/tired today.        Objective: See treatment diary below      Assessment: Tolerated treatment well. Patient continues need for intermittent cues to improve step length and avoid reaching outside YOLI when approaching chair during STS to reduce risk of falls.  She also continues with low endurance to standing activity with need for intermittent seated rest breaks.       Plan: Continue per plan of care.      Precautions: Imbalance/Falls Risk    Access Code: [email or web address]       Insurance:  A/CMS Eval/ Re-eval Auth #/ Referral # Total units  Start date  Expiration date KX Visit limitation?  PT only or  PT+OT? Co-Insurance   CMS 24 NA    No Prior 47 visits in   100% after deduct    24                                                             POC Start Date POC Expiration Date Signed POC?   24 Yes   24 Yes   1/9/25 3/6/25 Pend      Date 11/4 12/5 12/10 12/13 12/18 12/19 12/26 12/30 1/2 1/9 1/16 1/23   Visits/Units:  Used 1 2 3 4-KX 5-KX 6-KX 7-KX 8-KX 9 10 11 12   Authed:  Remaining   RE        RE         Date  2/3            Visits/Units:  Used 13 14 15            Authed:  Remaining                   Manuals 2/3 1/30 1/27 1/23 1/16    15 14 13 12 11                           Neuro Re-Ed        Seated add iso        Seated abd stab march  10x  3l   2x10   Fwd  "modified lunge at rail  10x B close supervision      Fwd/retro at railing   VCs to improve step length  [id number] ea     Side step at rail   Over circular cones (4)  5x15ft B  At table [id number] B CS   Side step on/off airex At railing  20x B 2 UEs on railing       CS retr     15x   Seated CS rot SNAGS    15x B 15x B + training   Fwd step up   8\" 2x10 1 HR   8\" 2x10 contra HR close supervision    SLS hip flex Tall cone taps  2x10 B 1 HR suppot   2x15 B 1 HR 2x10 B 1 HR   STS No airex  3x5 No airex  3x5 2x10 Chair + airex 2x10 Chair + airex Chair + airex  10x   Seated abd stab hip add iso    2x10 5s    TUG   5x with focus on large steps when approaching chair and control with STS             Ther Ex        Education     Reviewed findings of physician appt   HEP        6 MWT        5xSTS        DGI        Seated march 2x10 3lb       Harshil str  5x10s B seated      Seated trunk rot        Seated add iso        LAQ 2x10 B 3lb 2x10 B 3lbs                                     "

## 2025-02-06 ENCOUNTER — APPOINTMENT (OUTPATIENT)
Dept: PHYSICAL THERAPY | Facility: CLINIC | Age: 75
End: 2025-02-06
Payer: MEDICARE

## 2025-02-10 ENCOUNTER — EVALUATION (OUTPATIENT)
Dept: PHYSICAL THERAPY | Facility: CLINIC | Age: 75
End: 2025-02-10
Payer: MEDICARE

## 2025-02-10 DIAGNOSIS — R26.89 IMBALANCE: ICD-10-CM

## 2025-02-10 DIAGNOSIS — G62.9 NEUROPATHY: ICD-10-CM

## 2025-02-10 DIAGNOSIS — R26.9 GAIT DIFFICULTY: Primary | ICD-10-CM

## 2025-02-10 PROCEDURE — 97110 THERAPEUTIC EXERCISES: CPT | Performed by: PHYSICAL THERAPIST

## 2025-02-10 NOTE — PROGRESS NOTES
Re-Eval     Today's date: 2/10/2025  Patient name: Sahara Patel  : 1950  MRN: [id number]  Referring provider: Rubi Bill MD  Dx:   Encounter Diagnosis     ICD-10-CM    1. Gait difficulty  R26.9       2. Imbalance  R26.89       3. Neuropathy  G62.9                      Assessment  Current assessment details: Sahara Patel presents for an re-evaluation at [facility] for her imbalance, fatigue and neuropathy.  Overall Sahara continues with fluctuations day to day in her strength, balance, endurance that impacts her participation/tolerance level in her day to day life as well as participation in therapy.  Today however she was able to complete a full 6 minute walk test for the first time since 2024 walking 950ft compared to only making it 600ft in 5 minutes last evaluation.   She also demonstrated improved 5x STS and Dynamic Gait Index scores today compared to last evaluation.  Although she demonstrates these improvements she continues with high risk of falls indicated by her DGI score and tendencies for shuffling of her feet with ambulation, poor body mechanics when approaching a chair and/or negotiation in tight spaces.  She is able to improve upon her mechanics with extensive verbal and visual cues but carry over is not consistent with removal of cues and also can widely fluctuate session to session.  She is scheduled for upcoming procedure for parathyroid/thyroid surgery and also has had recent MRI of her brain to help identify possible underlying neurologic conditions impacting her overall decline and fluctuations in symptoms over the past 12 months. She will benefit from skilled therapy improve LE strength, quality of gait, balance and proprioceptive responses, endurance training, transfer training to improve her overall functional mobility and decrease risk of falls.    Impairments: abnormal gait, abnormal or restricted ROM, abnormal movement, activity intolerance, impaired balance,  impaired physical strength, lacks appropriate home exercise program, poor posture  and poor body mechanics  Understanding of Dx/Px/POC: good   Prognosis: good    Goals  STG 2-4 weeks: -   1. Improve 5xSTS score by >4 seconds indicating improve ability to transfer STS. - Met  2. Pt will demonstrate increase DGI score by 1-2 points as evidence of decreased imbalance.  - Met  3. Pt will be able to complete 6 minute walk test - Met    LTG 4-8 weeks: -   1. Pt will improve 6 minute walk test to >1000ft - Not Met  2. Pt will be I with comprehensive HEP. - Not Met  3. Improve hip strength by 1/2 grade in all deficient planes. - Met  4. Pt will demo >14/24 on dynamic gait index. - Not met      Plan  Plan details: HEP development, stretching, strengthening, A/AA/PROM, joint mobilizations, posture education, STM/MI as needed to reduce muscle tension, balance and proprioceptive training, muscle reeducation, PLOC discussed and agreed upon with patient.    Patient would benefit from: PT eval and skilled physical therapy  Planned modality interventions: cryotherapy and thermotherapy: hydrocollator packs  Planned therapy interventions: manual therapy, neuromuscular re-education, self care, therapeutic activities, therapeutic exercise and home exercise program  Frequency: 1-2x week  Duration in weeks: 8  Treatment plan discussed with: patient      Subjective Evaluation    History of Present Illness  Sahara Patel underwent procedure to remove half of thyroid and one parathyroid on 8/26/24.  During the procedure potential cancer was found in the thyroid and confirmed via biopsy.  She is to have further testing to determine if she will need to have parathyroid removed on opposite site and/or more of her thyroid.  She has also seen a neurologist specializing in movement and balance.  She did not see any evidence of PD, MS or any other conditions but contributes her increase in balance deficits due to her neuropathy, thryoid issues  "and diabetes.  She referred her back to [facility] at this time.    Since last being in therapy at this facility on 8/22/24.  Sahara Patel reports since then she feels significantly more fatigued, more imbalanced and having more difficulty getting around.  She states some days are better/worse than others.  She denies any falls in past month but has been more reliant on utilizing SPC for ambulation.  She has been avoiding walking outside on her own, relies on significant other to go with her for grocery shopping.  She also reports some days she is feeling \"foggy\" which is causing her to avoid driving.    Update 12/5/24:  Sahara Patel reports over the past month she has been experiencing significant fatigue that fluctuates day to day.  She states she is able to only do a couple things before she needs to sit and rest way more frequently than in the past.  She feels her balance is even worse forcing her to rely on leaning on shopping cart while in grocery store.  She was seen by her thyroid specialist who is now referring her to another specialist for thoracic surgery to remove her remaining thyroid and parathyroid due to placement of thyroid and continues signs of cancer.      Update 1/9/25:  Sahara Patel states she feels her endurance is a little better, she feels a little stronger over the past month.  She has been able to do more cooking for her family and not fatiguing as much as she had in the past.  Today however she feels more unstable and fatigued which she notices in her walking.  She is scheduled for follow up with specialist for her parathyroid this upcoming week to determine POC of about potential surgery.    Update 2/10/25:  Sahara Patel returns to surgeon on 2/24/25 to schedule surgery to remove the remaining portion of her thyroid.  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  She is to follow up with neurologist on these findings.  Last " week she had to make a cake for her grandson which took a lot of effort form her and she was extremely fatigued.  She is having to take frequent rest breaks with her normal activities due to fatigue.  She continues with fluctuations in her balance and endurance day to day.    Patient Goals  Patient goals for therapy: improved balance, increased strength and independence with ADLs/IADLs    Social Support  Steps to enter house: yes  4  Stairs in house: yes   4  Lives in: multiple-level home  Lives with: significant other    Employment status: not working  Exercise history: Pilates - 1-2x/wk (not currently)      Objective     Postural Observations  Seated posture: fair  Standing posture: fair    Neurological Testing     Sensation     Lumbar   Left   Diminished: light touch    Right   Diminished: light touch    Additional Neurological Details  Evidence of 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     Strength/Myotome Testing     Left Hip   Planes of Motion   Flexion: 4+   External rotation: 4+  Internal rotation: 4+    Right Hip   Planes of Motion   Flexion: 4+  External rotation: 4  Internal rotation: 4    Left Knee   Flexion: 5  Extension: 5    Right Knee   Flexion: 4+  Extension: 5    Left Ankle/Foot   Dorsiflexion: 5  Plantar flexion: 5    Right Ankle/Foot   Dorsiflexion: 5  Plantar flexion: 5    Ambulation     Ambulation: Stairs   Ascend stairs: independent  Pattern: reciprocal  Railings: one rail  Descend stairs: independent  Pattern: reciprocal  Railings: one rail      Observational Gait     Patient utilizing SPC for ambulation.  She demonstrates decreased stride length and walking speed, limited bilateral foot clearance with shuffling of her feet, left LE externally rotated.  She also demonstrated LOB with pivoting/turning, difficulty initiating movement and uncontrolled changes in her pace due to signs of forward LOB.  Evidence of overall increased risk of falls and need for close supervision.      During 6 MWT  and all functional assessments today Sahara demonstrated increased evidence of imbalance with need for cues to improve bilateral foot clearance, deviation side to side while walking in straight path, difficulty coordinating movements when turning and approaching chair or objects.  Need for extreme close supervision at all times.  Today was one of Sahara's worst days as other days she demonstrates decrease evidence of all the above with ability to ambulate safely without close supervision.       DGI  MDC: vestibular - 4 pts  MDC: geriatric/community - 3 pts  Falls risk <19/24 5xSTS: Lucila et al 2010  MDC: 2.3 sec  Age Norms:  60-69: 11.1 sec  70-79: 12.6 sec  80-89: 14.8 sec   6 Minute Walk Test  Age Norms  60-69: M - 1876 ft (571.80 m)  F - 1765 ft (537.98 m)  70-79: M - 1729 ft (527.00 m)  F - 1545 ft (470.92 m)  80-89: M - 1368 ft (416.97 m)  F - 1286 ft (391.97 m) mCTSIB  Norm: 20-60 yrs  Eyes open firm: norm sway 0.21-0.48  Eyes closed firm: norm sway 0.48-0.99  Eyes open foam: norm sway 0.38-0.71  Eyes closed foam: norm sway 0.70-2.22       Outcome Measures 1/22/24   3/21/2024 4/22/2024  5/30/24 8/5/24 11/4/24 12/5/24 1/9/25 2/10/25   5xSTS 22.06s 15.44s  Trial 1: 23.43s  Trial 2: 19.09s    UE assist  22.36s no UE support 18.26 no UE assist  23.10 no UE assist - slight posterior LOB 17.87s no UE support - decreased evidence of posteror LOB 48.34s initially no UE support but need for min assist by PT on last rep to prevent posterior LOB 23.00s with no UE support but 1 LOB posteriorly catching balance with UE on arm rest.   mCTSIB  - FTEO (firm)  - FTEC (firm)  - FTEO (foam)  - FTEC (foam) 0.94  1.28  1.38  2.75 w/ 1 grab of HR  Composite:1.58 0.91  1.23  1.42  3.06 w/ 1 grab of HR      Composite:  1.65 0.96   1.16   1.27   3.34     Composite: 1.68   1. 1.00  2. 1.33  3.  1.26  4. 3.39 >5 touches of hand rail  Composite:  1.74 0.61  0.90  1.02  2.70 w/ 2 grab of HR    Composite: 1.30 Deferred due to fatigue.  "Deferred due to fatigue levels Deferred due to fatigue levels Deferred due to fatigue levels   6MWT 1100ft 1250ft 1LOB while turning  1300ft   1250ft 1150ft 440ft @3:10 550ft @315 with significant imbalance, need for min A to restore balance on 2 occasions. 600ft in 5 min prior to stopping test - need for close supervision throughout due to evidence of imbalance 950ft completed full 6 minutes while needing verbal cues to reduce shuffling and need for close supervision due to imbalance   2MWT 375ft 400ft 450ft   410ft 400ft  300ft 350ft 275ft 325ft   DGI 19/24 21/24 21/24 18/24 16/24 9/24 9/24 9/24 11/24    ABC  70.6%              Mini-BesTest                   Precautions: Imbalance/Falls Risk    Access Code: [email or web address]       Insurance:  AMA/CMS Eval/ Re-eval Auth #/ Referral # Total units  Start date  Expiration date KX Visit limitation?  PT only or  PT+OT? Co-Insurance   CMS 11/4/24 NA    No Prior 47 visits in 2024  100% after deduct    12/5/24            1/9/25            2/10/25                                                 POC Start Date POC Expiration Date Signed POC?   11/4/24 12/30/24 Yes   12/5/24 1/30/25 Yes   1/9/25 3/6/25 Pend   2/10/25 4/7/25 Pend      Date 11/4 12/5 12/10 12/13 12/18 12/19 12/26 12/30 1/2 1/9 1/16 1/23   Visits/Units:  Used 1 2 3 4-KX 5-KX 6-KX 7-KX 8-KX 9 10 11 12   Authed:  Remaining   RE        RE         Date 1/27 1/30 2/3 2/10           Visits/Units:  Used 13 14 15 16           Authed:  Remaining     RE              Manuals 2/10 2/3 1/30 1/27 1/23    16 15 14 13 12                           Neuro Re-Ed        Seated add iso        Seated abd stab march   10x B 3lb     Fwd modified lunge at rail   10x B close supervision     Fwd/retro at railing    VCs to improve step length  [id number] ea    Side step at rail    Over circular cones (4)  5x15ft B    Side step on/off airex  At railing  20x B 2 UEs on railing      CS retr        Seated CS rot SNAGS     15x B   Fwd step up    8\" 2x10 1 " "HR   8\" 2x10 contra HR close supervision   SLS hip flex  Tall cone taps  2x10 B 1 HR suppot   2x15 B 1 HR   STS  No airex  3x5 No airex  3x5 2x10 Chair + airex 2x10 Chair + airex   Seated abd stab hip add iso     2x10 5s   TUG    5x with focus on large steps when approaching chair and control with STS            Ther Ex        Education        HEP Updatd POC       6 MWT performed       5xSTS performed       DGI performed       Seated march  2x10 3lb      Harshil str   5x10s B seated     Seated trunk rot        Seated add iso        LAQ  2x10 B 3lb 2x10 B 3lbs                                      "

## 2025-02-13 ENCOUNTER — OFFICE VISIT (OUTPATIENT)
Dept: PHYSICAL THERAPY | Facility: CLINIC | Age: 75
End: 2025-02-13
Payer: MEDICARE

## 2025-02-13 DIAGNOSIS — G62.9 NEUROPATHY: ICD-10-CM

## 2025-02-13 DIAGNOSIS — R26.9 GAIT DIFFICULTY: Primary | ICD-10-CM

## 2025-02-13 DIAGNOSIS — R26.89 IMBALANCE: ICD-10-CM

## 2025-02-13 PROCEDURE — 97112 NEUROMUSCULAR REEDUCATION: CPT | Performed by: PHYSICAL THERAPIST

## 2025-02-13 PROCEDURE — 97110 THERAPEUTIC EXERCISES: CPT | Performed by: PHYSICAL THERAPIST

## 2025-02-13 NOTE — PROGRESS NOTES
Daily Note     Today's date: 2025  Patient name: Sahara Patel  : 1950  MRN: [id number]  Referring provider: Rubi Bill MD  Dx:   Encounter Diagnosis     ICD-10-CM    1. Gait difficulty  R26.9       2. Imbalance  R26.89       3. Neuropathy  G62.9                      Subjective: Sahara Patel reports she feels tired today after having to wake up very early to get blood work done for her pituitary prior to her upcoming appt for her endocrinologist.  She states she has been utilizing her SPC less in her home.      Objective: See treatment diary below      Assessment: Tolerated treatment fair. Patient with low endurance during todays session with need for frequent rest breaks.  She was unable to complete >5 STS from standard chair and decreased control vs prior session with LOB posteriorly.      Plan: Continue per plan of care.      Precautions: Imbalance/Falls Risk    Access Code: [email or web address]       Insurance:  AMA/CMS Eval/ Re-eval Auth #/ Referral # Total units  Start date  Expiration date KX Visit limitation?  PT only or  PT+OT? Co-Insurance   CMS 24 NA    No Prior 47 visits in   100% after deduct    12/5/24            1/9/25            2/10/25                                                 POC Start Date POC Expiration Date Signed POC?   24 Yes   24 Yes   1/9/25 3/6/25 Pend   2/10/25 4/7/25 Pend      Date 11/4 12/5 12/10 12/13 12/18 12/19 12/26 12/30 1/2 1/9 1/16 1/23   Visits/Units:  Used 1 2 3 4-KX 5-KX 6-KX 7-KX 8-KX 9 10 11 12   Authed:  Remaining   RE        RE         Date 1/27 1/30 2/3 2/10 2/13          Visits/Units:  Used 13 14 15 16 17          Authed:  Remaining     RE              Manuals 2/13 2/10 2/3 1/30 1/27    17 16 15 14 13                           Neuro Re-Ed        Seated add iso        Seated abd stab march    10x B 3lb    Fwd modified lunge at rail    10x B close supervision    Fwd/retro at railing     VCs to improve step  Reviewed with patient treatment options including increasing dose of Effexor and/or referring to psychiatry for further evaluation and management. Patient elects to continue with Effexor and increase dosing for better effect. Patient to keep close follow-up in the next 4 to 6 weeks for reevaluation. They were instructed to call the office if unable to tolerate higher Effexor dosing. "length  [id number] ea   Side step at rail HR support [id number] B    Over circular cones (4)  5x15ft B   Side step on/off airex   At railing  20x B 2 UEs on railing     Outdoor amb SPC and CS - down 1 curb and 200ft       Seated CS rot SNAGS        Fwd step up     8\" 2x10 1 HR     SLS hip flex   Tall cone taps  2x10 B 1 HR suppot     STS 5x  No airex  3x5 No airex  3x5 2x10 Chair + airex   Seated abd stab hip add iso        TUG     5x with focus on large steps when approaching chair and control with STS   Romberg CS rot + flex/ext 2x5 ea CS       Ther Ex        Education        HEP  Updatd POC      6 MWT  performed      5xSTS  performed      DGI  performed      Seated march 2x10 1lb  2x10 3lb     Harshil str    5x10s B seated    Stand ham curl 2x10 B       B Heel raise 2x10       LAQ 2x10 B 1lb  2x10 B 3lb 2x10 B 3lbs                                       "

## 2025-02-17 ENCOUNTER — APPOINTMENT (OUTPATIENT)
Dept: PHYSICAL THERAPY | Facility: CLINIC | Age: 75
End: 2025-02-17
Payer: MEDICARE

## 2025-02-20 ENCOUNTER — OFFICE VISIT (OUTPATIENT)
Dept: PHYSICAL THERAPY | Facility: CLINIC | Age: 75
End: 2025-02-20
Payer: MEDICARE

## 2025-02-20 DIAGNOSIS — R26.89 IMBALANCE: ICD-10-CM

## 2025-02-20 DIAGNOSIS — R26.9 GAIT DIFFICULTY: Primary | ICD-10-CM

## 2025-02-20 DIAGNOSIS — G62.9 NEUROPATHY: ICD-10-CM

## 2025-02-20 PROCEDURE — 97110 THERAPEUTIC EXERCISES: CPT | Performed by: PHYSICAL THERAPIST

## 2025-02-20 PROCEDURE — 97112 NEUROMUSCULAR REEDUCATION: CPT | Performed by: PHYSICAL THERAPIST

## 2025-02-20 NOTE — PROGRESS NOTES
Daily Note     Today's date: 2025  Patient name: Sahara Patel  : 1950  MRN: [id number]  Referring provider: Rubi Bill MD  Dx:   Encounter Diagnosis     ICD-10-CM    1. Gait difficulty  R26.9       2. Imbalance  R26.89       3. Neuropathy  G62.9                      Subjective: Sahara Patel reports she is scheduled to return to surgeon to schedule surgery but it has been determined to remove both thyroid and parathyroid.  Her Neurologist spoke to her about the extra fluid on her brain and they are not going to treat at this time until upcoming surgery is performed and then monitor for change in symptoms.  If no change is reached then they can possibly drain fluid to see if that helps.        Objective: See treatment diary below      Assessment: Tolerated treatment well. Patient demonstrated fatigue post treatment but improved control with sit to stands today and less evidence of shuffling during ambulation while negotiating in the clinic.      Plan: Continue per plan of care.      Precautions: Imbalance/Falls Risk    Access Code: [email or web address]       Insurance:  A/CMS Eval/ Re-eval Auth #/ Referral # Total units  Start date  Expiration date KX Visit limitation?  PT only or  PT+OT? Co-Insurance   CMS 24 NA    No Prior 47 visits in   100% after deduct    12/5/24            1/9/25            2/10/25                                                 POC Start Date POC Expiration Date Signed POC?   24 Yes   24 Yes   1/9/25 3/6/25 Pend   2/10/25 4/7/25 Pend      Date 11/4 12/5 12/10 12/13 12/18 12/19 12/26 12/30 1/2 1/9 1/16 1/23   Visits/Units:  Used 1 2 3 4-KX 5-KX 6-KX 7-KX 8-KX 9 10 11 12   Authed:  Remaining   RE        RE         Date 1/27 1/30 2/3 2/10 2/13 2/20         Visits/Units:  Used 13 14 15 16 17 18         Authed:  Remaining     RE              Manuals 2/20 2/13 2/10 2/3 1/30    18 17 16 15 14                           Neuro Re-Ed        Seated add  iso        Seated abd stab march     10x B 3lb   Fwd modified lunge at rail     10x B close supervision   Fwd/retro at railing        Side step at rail HR support [id number] B HR support [id number] B      Side step on/off airex    At railing  20x B 2 UEs on railing    Outdoor amb  SPC and CS - down 1 curb and 200ft      Seated CS rot SNAGS        Fwd step up        SLS hip flex 2x15 B   Tall cone taps  2x10 B 1 HR suppot    STS 8x 5x  No airex  3x5 No airex  3x5   Seated abd stab hip add iso        TUG        Romberg CS rot + flex/ext  2x5 ea CS      Ther Ex        Education        HEP   Updatd POC     6 MWT   performed     5xSTS   performed     DGI   performed     Seated march 4x10 2lb 2x10 1lb  2x10 3lb    Harshil str     5x10s B seated   Stand ham curl  2x10 B      B Heel raise  2x10      LAQ 20x B 2lb 2x10 B 1lb  2x10 B 3lb 2x10 B 3lbs

## 2025-02-24 ENCOUNTER — APPOINTMENT (OUTPATIENT)
Dept: PHYSICAL THERAPY | Facility: CLINIC | Age: 75
End: 2025-02-24
Payer: MEDICARE

## 2025-02-27 ENCOUNTER — OFFICE VISIT (OUTPATIENT)
Dept: PHYSICAL THERAPY | Facility: CLINIC | Age: 75
End: 2025-02-27
Payer: MEDICARE

## 2025-02-27 DIAGNOSIS — G62.9 NEUROPATHY: ICD-10-CM

## 2025-02-27 DIAGNOSIS — R26.9 GAIT DIFFICULTY: Primary | ICD-10-CM

## 2025-02-27 DIAGNOSIS — R26.89 IMBALANCE: ICD-10-CM

## 2025-02-27 PROCEDURE — 97110 THERAPEUTIC EXERCISES: CPT | Performed by: PHYSICAL THERAPIST

## 2025-02-27 PROCEDURE — 97112 NEUROMUSCULAR REEDUCATION: CPT | Performed by: PHYSICAL THERAPIST

## 2025-02-27 NOTE — PROGRESS NOTES
Daily Note     Today's date: 2025  Patient name: Sahara Patel  : 1950  MRN: [id number]  Referring provider: Rubi Bill MD  Dx:   Encounter Diagnosis     ICD-10-CM    1. Gait difficulty  R26.9       2. Imbalance  R26.89       3. Neuropathy  G62.9                      Subjective: Sahara Patel saw her specialist at [facility] and scheduled surgery for 3/25/25.  She will possibly have either just the parathyroid and/or thyroid removed depending on testing during procedure.  There are also still not guarantees this will help her balance deficits.  She states her family has been noticing she moves better on days she goes to PT and also needs reminders to look up while walking to reduce her shuffling.      Objective: See treatment diary below      Assessment: Tolerated treatment fair. Patient continues with low endurance to all standing activity, signs of LE weakness impacting quality of STS from standard height chair.  She also continues with tendency for shuffling of feet L>R while ambulating in close quarters, change of directions and approaching chair.  She required close supervision and constant verbal cues to improve step length and foot clearance.       Plan: Continue per plan of care.      Precautions: Imbalance/Falls Risk    Access Code: [email or web address]       Insurance:  A/CMS Eval/ Re-eval Auth #/ Referral # Total units  Start date  Expiration date KX Visit limitation?  PT only or  PT+OT? Co-Insurance   CMS 24 NA    No Prior 47 visits in   100% after deduct    12/5/24            1/9/25            2/10/25                                                 POC Start Date POC Expiration Date Signed POC?   24 Yes   24 Yes   1/9/25 3/6/25 Pend   2/10/25 4/7/25 Pend      Date 11/4 12/5 12/10 12/13 12/18 12/19 12/26 12/30 1/2 1/9 1/16 1/23   Visits/Units:  Used 1 2 3 4-KX 5-KX 6-KX 7-KX 8-KX 9 10 11 12   Authed:  Remaining   RE        RE         Date 1/27 1/30 2/3 2/10  2/13 2/20 2/27        Visits/Units:  Used 13 14 15 16 17 18 19        Authed:  Remaining     RE              Manuals 2/27 2/20 2/13 2/10 2/3    19 18 17 16 15                           Neuro Re-Ed        Seated add iso        Seated abd stab march        Fwd modified lunge at rail        Slalom walking through zig zag of cones 7 cones - 30ft need for CS throughout    3x through course - improved quality each set       Side step at rail HR support and cues to improve step length and reduce posterior WS  2x5 15ft B HR support [id number] B HR support [id number] B     Side step on/off airex     At railing  20x B 2 UEs on railing   Outdoor amb   SPC and CS - down 1 curb and 200ft     Seated CS rot SNAGS        Fwd step up        SLS hip flex  2x15 B   Tall cone taps  2x10 B 1 HR suppot   STS 2x5 8x 5x  No airex  3x5   Seated abd stab hip add iso        TUG        Romberg CS rot + flex/ext   2x5 ea CS     Ther Ex        Education Reviewed updated POC regarding upcoming sx       HEP    Updatd POC    6 MWT    performed    5xSTS    performed    DGI    performed    Seated march 2x20 2lb 4x10 2lb 2x10 1lb  2x10 3lb   Harshil str        Stand ham curl   2x10 B     B Heel raise   2x10     LAQ 20x B 1lb 20x B 2lb 2x10 B 1lb  2x10 B 3lb

## 2025-03-03 ENCOUNTER — APPOINTMENT (OUTPATIENT)
Dept: PHYSICAL THERAPY | Facility: CLINIC | Age: 75
End: 2025-03-03
Payer: MEDICARE

## 2025-03-06 ENCOUNTER — OFFICE VISIT (OUTPATIENT)
Dept: PHYSICAL THERAPY | Facility: CLINIC | Age: 75
End: 2025-03-06
Payer: MEDICARE

## 2025-03-06 DIAGNOSIS — G62.9 NEUROPATHY: ICD-10-CM

## 2025-03-06 DIAGNOSIS — R26.9 GAIT DIFFICULTY: Primary | ICD-10-CM

## 2025-03-06 DIAGNOSIS — R26.89 IMBALANCE: ICD-10-CM

## 2025-03-06 PROCEDURE — 97110 THERAPEUTIC EXERCISES: CPT | Performed by: PHYSICAL THERAPIST

## 2025-03-06 PROCEDURE — 97112 NEUROMUSCULAR REEDUCATION: CPT | Performed by: PHYSICAL THERAPIST

## 2025-03-06 NOTE — PROGRESS NOTES
Daily Note     Today's date: 3/6/2025  Patient name: Sahara Patel  : 1950  MRN: [id number]  Referring provider: Rubi Bill MD  Dx:   Encounter Diagnosis     ICD-10-CM    1. Gait difficulty  R26.9       2. Imbalance  R26.89       3. Neuropathy  G62.9                      Subjective: Sahara Patel reports she had a dental emergency earlier this week.  She continues with same complaints of fatigue and imbalance in her walking.        Objective: See treatment diary below      Assessment: Tolerated treatment well. Patient demonstrated fatigue post treatment but able to improve quality of step length during ambulation around cones with less frequent evidence of shuffling her feet.  She continues with low standing endurance and evidence of imbalance.       Plan: Continue per plan of care.      Precautions: Imbalance/Falls Risk    Access Code: [email or web address]       Insurance:  A/CMS Eval/ Re-eval Auth #/ Referral # Total units  Start date  Expiration date KX Visit limitation?  PT only or  PT+OT? Co-Insurance   CMS 24 NA    No Prior 47 visits in   100% after deduct    12/5/24            1/9/25            2/10/25                                                 POC Start Date POC Expiration Date Signed POC?   24 Yes   24 Yes   1/9/25 3/6/25 Pend   2/10/25 4/7/25 Pend      Date 11/4 12/5 12/10 12/13 12/18 12/19 12/26 12/30 1/2 1/9 1/16 1/23   Visits/Units:  Used 1 2 3 4-KX 5-KX 6-KX 7-KX 8-KX 9 10 11 12   Authed:  Remaining   RE        RE         Date 1/27 1/30 2/3 2/10 2/13 2/20 2/27 3/6       Visits/Units:  Used 13 14 15 16 17 18 19 20       Authed:  Remaining     RE              Manuals 3/6 2/27 2/20 2/13 2/10    20 19 18 17 16                           Neuro Re-Ed        Seated add iso        Seated abd stab march        Fwd modified lunge at rail        Slalom walking through zig zag of cones 10 cones - 30ft need for CS and VCs to improve step length reduce  shuffling.    5x 7 cones - 30ft need for CS throughout    3x through course - improved quality each set      Side step at rail HR support  [id number] HR support and cues to improve step length and reduce posterior WS  2x5 15ft B HR support [id number] B HR support [id number] B    Side step on/off airex        Outdoor amb    SPC and CS - down 1 curb and 200ft    Seated CS rot SNAGS        Fwd step up        SLS hip flex 2x15 B 1 HR support  2x15 B     STS 2x5 2x5 8x 5x    Seated abd stab hip add iso        TUG        Romberg CS rot + flex/ext    2x5 ea CS    Ther Ex        Education  Reviewed updated POC regarding upcoming sx      HEP     Updatd POC   6 MWT     performed   5xSTS     performed   DGI     performed   Seated march 2x20 2lb 2x20 2lb 4x10 2lb 2x10 1lb    Harshil str        Stand ham curl    2x10 B    B Heel raise    2x10    LAQ 20x B 1lb 20x B 1lb 20x B 2lb 2x10 B 1lb

## 2025-03-10 ENCOUNTER — EVALUATION (OUTPATIENT)
Dept: PHYSICAL THERAPY | Facility: CLINIC | Age: 75
End: 2025-03-10
Payer: MEDICARE

## 2025-03-10 DIAGNOSIS — G62.9 NEUROPATHY: ICD-10-CM

## 2025-03-10 DIAGNOSIS — R26.89 IMBALANCE: ICD-10-CM

## 2025-03-10 DIAGNOSIS — R26.9 GAIT DIFFICULTY: Primary | ICD-10-CM

## 2025-03-10 PROCEDURE — 97110 THERAPEUTIC EXERCISES: CPT | Performed by: PHYSICAL THERAPIST

## 2025-03-10 PROCEDURE — 97112 NEUROMUSCULAR REEDUCATION: CPT | Performed by: PHYSICAL THERAPIST

## 2025-03-10 NOTE — LETTER
2025    Rubi Bill MD  [address]  # [place] [id number]    Patient: Sahara Patel   YOB: 1950   Date of Visit: 3/10/2025     Encounter Diagnosis     ICD-10-CM    1. Gait difficulty  R26.9       2. Imbalance  R26.89       3. Neuropathy  G62.9           Dear Dr. Bill:    Thank you for your recent referral of Sahara Patel. Please review the attached evaluation summary from Sahara's recent visit.     Please verify that you agree with the plan of care by signing the attached order.     If you have any questions or concerns, please do not hesitate to call.     I sincerely appreciate the opportunity to share in the care of one of your patients and hope to have another opportunity to work with you in the near future.       Sincerely,    Devon Robles, PT      Referring Provider:      I certify that I have read the below Plan of Care and certify the need for these services furnished under this plan of treatment while under my care.                    Rubi Bill MD  [address]  # [address] [address]  Via Fax: [phone]          Re-Eval     Today's date: 3/10/2025  Patient name: Sahara Patel  : 1950  MRN: [id number]  Referring provider: Rubi Bill MD  Dx:   Encounter Diagnosis     ICD-10-CM    1. Gait difficulty  R26.9       2. Imbalance  R26.89       3. Neuropathy  G62.9                      Assessment  Current assessment details: Sahara Patel presents for an re-evaluation at [facility] for her imbalance, fatigue and neuropathy.  Overall Sahara continues with fluctuations day to day in her strength, balance, endurance that impacts her participation/tolerance level in her day to day life as well as participation in therapy.  Today she presented with reports of being very fatigued which was demonstrated in her inability to complete her 6 MWT having to sit at about 4 minute corrine leading to reduced distance walked.  She also demonstrated significant  increase in shuffling gait pattern R>L requiring increased close supervision and verbal cues to improve step length.  She did however demonstrate improved dynamic gait index by 2 points to 13/24 but still at high risk for falls.  She also continues difficulty coordinating movements of her feet when approaching a chair or in small areas leading to significant shuffling of feet and evidence of imbalance with COG outside of her YOLI.  Her fluctuating symptoms is consistent with possible underlying thyroid/parathyroid problems that she is scheduled for upcoming procedure on 3/24/25.  Her continued impaired gait pattern, evidence for imbalance, decreased LE strength continues to impact her functional mobility, increasing reliance on SPC for ambulation, negotiate stairs, carry items, transfer sit to stand and limiting her from helping care for her grandchildren.  She will benefit from skilled therapy until her upcoming procedure to improve LE strength, quality of gait, balance and proprioceptive responses, endurance training, transfer training to improve her overall functional mobility and decrease risk of falls.  Patient to be on hold from therapy following her procedure.     Impairments: abnormal gait, abnormal or restricted ROM, abnormal movement, activity intolerance, impaired balance, impaired physical strength, lacks appropriate home exercise program, poor posture  and poor body mechanics  Understanding of Dx/Px/POC: good   Prognosis: good    Goals  STG 2-4 weeks: -   1. Improve 5xSTS score by >4 seconds indicating improve ability to transfer STS. - Met  2. Pt will demonstrate increase DGI score by 1-2 points as evidence of decreased imbalance.  - Met  3. Pt will be able to complete 6 minute walk test - Regressed to unable to complete 3/10/25    LTG 4-8 weeks: -   1. Pt will improve 6 minute walk test to >1000ft - Not Met  2. Pt will be I with comprehensive HEP. - Not Met  3. Improve hip strength by 1/2 grade in all  "deficient planes. - Met  4. Pt will demo >14/24 on dynamic gait index. - Not met      Plan  Plan details: HEP development, stretching, strengthening, A/AA/PROM, joint mobilizations, posture education, STM/MI as needed to reduce muscle tension, balance and proprioceptive training, muscle reeducation, PLOC discussed and agreed upon with patient.    Patient would benefit from: PT eval and skilled physical therapy  Planned modality interventions: cryotherapy and thermotherapy: hydrocollator packs  Planned therapy interventions: manual therapy, neuromuscular re-education, self care, therapeutic activities, therapeutic exercise and home exercise program  Frequency: 1-2x week  Duration in weeks: 8  Treatment plan discussed with: patient      Subjective Evaluation    History of Present Illness  Sahara Patel underwent procedure to remove half of thyroid and one parathyroid on 8/26/24.  During the procedure potential cancer was found in the thyroid and confirmed via biopsy.  She is to have further testing to determine if she will need to have parathyroid removed on opposite site and/or more of her thyroid.  She has also seen a neurologist specializing in movement and balance.  She did not see any evidence of PD, MS or any other conditions but contributes her increase in balance deficits due to her neuropathy, thryoid issues and diabetes.  She referred her back to [facility] at this time.    Since last being in therapy at this facility on 8/22/24.  Sahara Patel reports since then she feels significantly more fatigued, more imbalanced and having more difficulty getting around.  She states some days are better/worse than others.  She denies any falls in past month but has been more reliant on utilizing SPC for ambulation.  She has been avoiding walking outside on her own, relies on significant other to go with her for grocery shopping.  She also reports some days she is feeling \"foggy\" which is causing her to avoid " driving.    Update 12/5/24:  Sahara Patel reports over the past month she has been experiencing significant fatigue that fluctuates day to day.  She states she is able to only do a couple things before she needs to sit and rest way more frequently than in the past.  She feels her balance is even worse forcing her to rely on leaning on shopping cart while in grocery store.  She was seen by her thyroid specialist who is now referring her to another specialist for thoracic surgery to remove her remaining thyroid and parathyroid due to placement of thyroid and continues signs of cancer.      Update 1/9/25:  Sahara Patel states she feels her endurance is a little better, she feels a little stronger over the past month.  She has been able to do more cooking for her family and not fatiguing as much as she had in the past.  Today however she feels more unstable and fatigued which she notices in her walking.  She is scheduled for follow up with specialist for her parathyroid this upcoming week to determine POC of about potential surgery.    Update 2/10/25:  Sahara Patel returns to surgeon on 2/24/25 to schedule surgery to remove the remaining portion of her thyroid.  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  She is to follow up with neurologist on these findings.  Last week she had to make a cake for her grandson which took a lot of effort form her and she was extremely fatigued.  She is having to take frequent rest breaks with her normal activities due to fatigue.  She continues with fluctuations in her balance and endurance day to day.    Update 3/10/25:  Sahara Patel reports she has been utilizing her SPC while walking in her home as she has been feeling unsteady compared to previously using no AD.  She states today she woke up very tired and this continues to fluctuate day to day.  She also reports she was asked to watch her grandchildren but she  doesn't feel she is strong enough, balanced enough to be able to care for them so deferred.      Patient Goals  Patient goals for therapy: improved balance, increased strength and independence with ADLs/IADLs    Social Support  Steps to enter house: yes  4  Stairs in house: yes   4  Lives in: multiple-level home  Lives with: significant other    Employment status: not working  Exercise history: Pilates - 1-2x/wk (not currently)      Objective     Postural Observations  Seated posture: fair  Standing posture: fair    Neurological Testing     Sensation     Lumbar   Left   Diminished: light touch    Right   Diminished: light touch    Additional Neurological Details  Evidence of 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     Strength/Myotome Testing     Left Hip   Planes of Motion   Flexion: 4+   External rotation: 4+  Internal rotation: 4+    Right Hip   Planes of Motion   Flexion: 4+  External rotation: 4  Internal rotation: 4    Left Knee   Flexion: 5  Extension: 5    Right Knee   Flexion: 4+  Extension: 5    Left Ankle/Foot   Dorsiflexion: 5  Plantar flexion: 5    Right Ankle/Foot   Dorsiflexion: 5  Plantar flexion: 5    Ambulation     Ambulation: Stairs   Ascend stairs: independent  Pattern: reciprocal  Railings: one rail  Descend stairs: independent  Pattern: reciprocal  Railings: one rail      Observational Gait     Patient utilizing SPC for ambulation.  She demonstrates decreased stride length and walking speed, limited bilateral foot clearance with shuffling of her feet, left LE externally rotated.  She also demonstrated LOB with pivoting/turning, difficulty initiating movement and uncontrolled changes in her pace due to signs of forward LOB.  Evidence of overall increased risk of falls and need for close supervision.      During 6 MWT and all functional assessments today Sahara demonstrated increased evidence of imbalance with need for cues to improve bilateral foot clearance, deviation side to side while walking  in straight path, difficulty coordinating movements when turning and approaching chair or objects.  Need for extreme close supervision at all times.  Today was one of Sahara's worst days as other days she demonstrates decrease evidence of all the above with ability to ambulate safely without close supervision.       DGI  MDC: vestibular - 4 pts  MDC: geriatric/community - 3 pts  Falls risk <19/24 5xSTS: Lucila et al 2010  MDC: 2.3 sec  Age Norms:  60-69: 11.1 sec  70-79: 12.6 sec  80-89: 14.8 sec   6 Minute Walk Test  Age Norms  60-69: M - 1876 ft (571.80 m)  F - 1765 ft (537.98 m)  70-79: M - 1729 ft (527.00 m)  F - 1545 ft (470.92 m)  80-89: M - 1368 ft (416.97 m)  F - 1286 ft (391.97 m) mCTSIB  Norm: 20-60 yrs  Eyes open firm: norm sway 0.21-0.48  Eyes closed firm: norm sway 0.48-0.99  Eyes open foam: norm sway 0.38-0.71  Eyes closed foam: norm sway 0.70-2.22       Outcome Measures 1/22/24   3/21/2024 4/22/2024  5/30/24 8/5/24 11/4/24 1/9/25 2/10/25 3/10/25   5xSTS 22.06s 15.44s  Trial 1: 23.43s  Trial 2: 19.09s    UE assist  22.36s no UE support 18.26 no UE assist  23.10 no UE assist - slight posterior LOB 48.34s initially no UE support but need for min assist by PT on last rep to prevent posterior LOB 23.00s with no UE support but 1 LOB posteriorly catching balance with UE on arm rest. 19.97s with no UE support but did lose balance posteriorly falling into chair on last repetition.   6MWT 1100ft 1250ft 1LOB while turning  1300ft   1250ft 1150ft 440ft @3:10 600ft in 5 min prior to stopping test - need for close supervision throughout due to evidence of imbalance 950ft completed full 6 minutes while needing verbal cues to reduce shuffling and need for close supervision due to imbalance 515ft completed in 3:55 having to stop due to fatigue.  She requires continued CS throughout and cues to improve foot clearance.   2MWT 375ft 400ft 450ft   410ft 400ft  300ft 275ft 325ft 325ft   DGI 19/24 21/24 21/24 18/24 16/24 9/24  9/24 11/24 13/24    ABC  70.6%              [employer]                     Precautions: Imbalance/Falls Risk    Access Code: [email or web address]       Insurance:  AMA/CMS Eval/ Re-eval Auth #/ Referral # Total units  Start date  Expiration date KX Visit limitation?  PT only or  PT+OT? Co-Insurance   CMS 11/4/24 NA    No Prior 47 visits in 2024  100% after deduct    12/5/24            1/9/25            2/10/25            3/10/25                                     POC Start Date POC Expiration Date Signed POC?   11/4/24 12/30/24 Yes   12/5/24 1/30/25 Yes   1/9/25 3/6/25 Pend   2/10/25 4/7/25 Pend   3/10/25 5/5/25 Pend      Date 11/4 12/5 12/10 12/13 12/18 12/19 12/26 12/30 1/2 1/9 1/16 1/23   Visits/Units:  Used 1 2 3 4-KX 5-KX 6-KX 7-KX 8-KX 9 10 11 12   Authed:  Remaining   RE        RE         Date 1/27 1/30 2/3 2/10 2/13 2/20 2/27 3/6 3/10      Visits/Units:  Used 13 14 15 16 17 18 19 20 21      Authed:  Remaining     RE     RE         Manuals 3/10 3/6 2/27 2/20 2/13    21 20 19 18 17    KX KX                      Neuro Re-Ed        Seated add iso        Seated abd stab march        Fwd modified lunge at rail        Slalom walking through zig zag of cones  10 cones - 30ft need for CS and VCs to improve step length reduce shuffling.    5x 7 cones - 30ft need for CS throughout    3x through course - improved quality each set     Side step at rail  HR support  [id number] HR support and cues to improve step length and reduce posterior WS  2x5 15ft B HR support [id number] B HR support [id number] B   Side step on/off airex        Outdoor amb     SPC and CS - down 1 curb and 200ft   Seated CS rot SNAGS        Fwd step up        SLS hip flex  2x15 B 1 HR support  2x15 B    STS 5x 2x5 2x5 8x 5x   Tandem stance 3 bouts until fatigue B w/CS and by table       TUG        Romberg CS rot + flex/ext     2x5 ea CS   Ther Ex        Education Updated POC  Reviewed updated POC regarding upcoming sx     HEP        6 MWT Performed       5xSTS  performed       DGI performed       Seated march  2x20 2lb 2x20 2lb 4x10 2lb 2x10 1lb   Harshil str        Stand ham curl     2x10 B   B Heel raise     2x10   LAQ  20x B 1lb 20x B 1lb 20x B 2lb 2x10 B 1lb

## 2025-03-10 NOTE — PROGRESS NOTES
Re-Eval     Today's date: 3/10/2025  Patient name: Sahara Patel  : 1950  MRN: [id number]  Referring provider: Rubi Bill MD  Dx:   Encounter Diagnosis     ICD-10-CM    1. Gait difficulty  R26.9       2. Imbalance  R26.89       3. Neuropathy  G62.9                      Assessment  Current assessment details: Sahara Patel presents for an re-evaluation at [facility] for her imbalance, fatigue and neuropathy.  Overall Sahara continues with fluctuations day to day in her strength, balance, endurance that impacts her participation/tolerance level in her day to day life as well as participation in therapy.  Today she presented with reports of being very fatigued which was demonstrated in her inability to complete her 6 MWT having to sit at about 4 minute corrine leading to reduced distance walked.  She also demonstrated significant increase in shuffling gait pattern R>L requiring increased close supervision and verbal cues to improve step length.  She did however demonstrate improved dynamic gait index by 2 points to  but still at high risk for falls.  She also continues difficulty coordinating movements of her feet when approaching a chair or in small areas leading to significant shuffling of feet and evidence of imbalance with COG outside of her YOLI.  Her fluctuating symptoms is consistent with possible underlying thyroid/parathyroid problems that she is scheduled for upcoming procedure on 3/24/25.  Her continued impaired gait pattern, evidence for imbalance, decreased LE strength continues to impact her functional mobility, increasing reliance on SPC for ambulation, negotiate stairs, carry items, transfer sit to stand and limiting her from helping care for her grandchildren.  She will benefit from skilled therapy until her upcoming procedure to improve LE strength, quality of gait, balance and proprioceptive responses, endurance training, transfer training to improve her overall functional mobility  and decrease risk of falls.  Patient to be on hold from therapy following her procedure.     Impairments: abnormal gait, abnormal or restricted ROM, abnormal movement, activity intolerance, impaired balance, impaired physical strength, lacks appropriate home exercise program, poor posture  and poor body mechanics  Understanding of Dx/Px/POC: good   Prognosis: good    Goals  STG 2-4 weeks: -   1. Improve 5xSTS score by >4 seconds indicating improve ability to transfer STS. - Met  2. Pt will demonstrate increase DGI score by 1-2 points as evidence of decreased imbalance.  - Met  3. Pt will be able to complete 6 minute walk test - Regressed to unable to complete 3/10/25    LTG 4-8 weeks: -   1. Pt will improve 6 minute walk test to >1000ft - Not Met  2. Pt will be I with comprehensive HEP. - Not Met  3. Improve hip strength by 1/2 grade in all deficient planes. - Met  4. Pt will demo >14/24 on dynamic gait index. - Not met      Plan  Plan details: HEP development, stretching, strengthening, A/AA/PROM, joint mobilizations, posture education, STM/MI as needed to reduce muscle tension, balance and proprioceptive training, muscle reeducation, PLOC discussed and agreed upon with patient.    Patient would benefit from: PT eval and skilled physical therapy  Planned modality interventions: cryotherapy and thermotherapy: hydrocollator packs  Planned therapy interventions: manual therapy, neuromuscular re-education, self care, therapeutic activities, therapeutic exercise and home exercise program  Frequency: 1-2x week  Duration in weeks: 8  Treatment plan discussed with: patient      Subjective Evaluation    History of Present Illness  Sahara Patel underwent procedure to remove half of thyroid and one parathyroid on 8/26/24.  During the procedure potential cancer was found in the thyroid and confirmed via biopsy.  She is to have further testing to determine if she will need to have parathyroid removed on opposite site and/or  "more of her thyroid.  She has also seen a neurologist specializing in movement and balance.  She did not see any evidence of PD, MS or any other conditions but contributes her increase in balance deficits due to her neuropathy, thryoid issues and diabetes.  She referred her back to [facility] at this time.    Since last being in therapy at this facility on 8/22/24.  Sahara Patel reports since then she feels significantly more fatigued, more imbalanced and having more difficulty getting around.  She states some days are better/worse than others.  She denies any falls in past month but has been more reliant on utilizing SPC for ambulation.  She has been avoiding walking outside on her own, relies on significant other to go with her for grocery shopping.  She also reports some days she is feeling \"foggy\" which is causing her to avoid driving.    Update 12/5/24:  Sahara Patel reports over the past month she has been experiencing significant fatigue that fluctuates day to day.  She states she is able to only do a couple things before she needs to sit and rest way more frequently than in the past.  She feels her balance is even worse forcing her to rely on leaning on shopping cart while in grocery store.  She was seen by her thyroid specialist who is now referring her to another specialist for thoracic surgery to remove her remaining thyroid and parathyroid due to placement of thyroid and continues signs of cancer.      Update 1/9/25:  Sahara Patel states she feels her endurance is a little better, she feels a little stronger over the past month.  She has been able to do more cooking for her family and not fatiguing as much as she had in the past.  Today however she feels more unstable and fatigued which she notices in her walking.  She is scheduled for follow up with specialist for her parathyroid this upcoming week to determine POC of about potential surgery.    Update 2/10/25:  Sahara Patel returns to surgeon " on 2/24/25 to schedule surgery to remove the remaining portion of her thyroid.  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  She is to follow up with neurologist on these findings.  Last week she had to make a cake for her grandson which took a lot of effort form her and she was extremely fatigued.  She is having to take frequent rest breaks with her normal activities due to fatigue.  She continues with fluctuations in her balance and endurance day to day.    Update 3/10/25:  Sahara Patel reports she has been utilizing her SPC while walking in her home as she has been feeling unsteady compared to previously using no AD.  She states today she woke up very tired and this continues to fluctuate day to day.  She also reports she was asked to watch her grandchildren but she doesn't feel she is strong enough, balanced enough to be able to care for them so deferred.      Patient Goals  Patient goals for therapy: improved balance, increased strength and independence with ADLs/IADLs    Social Support  Steps to enter house: yes  4  Stairs in house: yes   4  Lives in: multiple-level home  Lives with: significant other    Employment status: not working  Exercise history: Pilates - 1-2x/wk (not currently)      Objective     Postural Observations  Seated posture: fair  Standing posture: fair    Neurological Testing     Sensation     Lumbar   Left   Diminished: light touch    Right   Diminished: light touch    Additional Neurological Details  Evidence of 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     Strength/Myotome Testing     Left Hip   Planes of Motion   Flexion: 4+   External rotation: 4+  Internal rotation: 4+    Right Hip   Planes of Motion   Flexion: 4+  External rotation: 4  Internal rotation: 4    Left Knee   Flexion: 5  Extension: 5    Right Knee   Flexion: 4+  Extension: 5    Left Ankle/Foot   Dorsiflexion: 5  Plantar flexion: 5    Right Ankle/Foot   Dorsiflexion:  5  Plantar flexion: 5    Ambulation     Ambulation: Stairs   Ascend stairs: independent  Pattern: reciprocal  Railings: one rail  Descend stairs: independent  Pattern: reciprocal  Railings: one rail      Observational Gait     Patient utilizing SPC for ambulation.  She demonstrates decreased stride length and walking speed, limited bilateral foot clearance with shuffling of her feet, left LE externally rotated.  She also demonstrated LOB with pivoting/turning, difficulty initiating movement and uncontrolled changes in her pace due to signs of forward LOB.  Evidence of overall increased risk of falls and need for close supervision.      During 6 MWT and all functional assessments today Sahara demonstrated increased evidence of imbalance with need for cues to improve bilateral foot clearance, deviation side to side while walking in straight path, difficulty coordinating movements when turning and approaching chair or objects.  Need for extreme close supervision at all times.  Today was one of Sahara's worst days as other days she demonstrates decrease evidence of all the above with ability to ambulate safely without close supervision.       DGI  MDC: vestibular - 4 pts  MDC: geriatric/community - 3 pts  Falls risk <19/24 5xSTS: Lucila et al 2010  MDC: 2.3 sec  Age Norms:  60-69: 11.1 sec  70-79: 12.6 sec  80-89: 14.8 sec   6 Minute Walk Test  Age Norms  60-69: M - 1876 ft (571.80 m)  F - 1765 ft (537.98 m)  70-79: M - 1729 ft (527.00 m)  F - 1545 ft (470.92 m)  80-89: M - 1368 ft (416.97 m)  F - 1286 ft (391.97 m) mCTSIB  Norm: 20-60 yrs  Eyes open firm: norm sway 0.21-0.48  Eyes closed firm: norm sway 0.48-0.99  Eyes open foam: norm sway 0.38-0.71  Eyes closed foam: norm sway 0.70-2.22       Outcome Measures 1/22/24   3/21/2024 4/22/2024  5/30/24 8/5/24 11/4/24 1/9/25 2/10/25 3/10/25   5xSTS 22.06s 15.44s  Trial 1: 23.43s  Trial 2: 19.09s    UE assist  22.36s no UE support 18.26 no UE assist  23.10 no UE assist - slight  posterior LOB 48.34s initially no UE support but need for min assist by PT on last rep to prevent posterior LOB 23.00s with no UE support but 1 LOB posteriorly catching balance with UE on arm rest. 19.97s with no UE support but did lose balance posteriorly falling into chair on last repetition.   6MWT 1100ft 1250ft 1LOB while turning  1300ft   1250ft 1150ft 440ft @3:10 600ft in 5 min prior to stopping test - need for close supervision throughout due to evidence of imbalance 950ft completed full 6 minutes while needing verbal cues to reduce shuffling and need for close supervision due to imbalance 515ft completed in 3:55 having to stop due to fatigue.  She requires continued CS throughout and cues to improve foot clearance.   2MWT 375ft 400ft 450ft   410ft 400ft  300ft 275ft 325ft 325ft   DGI 19/24 21/24 21/24 18/24 16/24 9/24 9/24 11/24 13/24    ABC  70.6%              M[employer]                     Precautions: Imbalance/Falls Risk    Access Code: [email or web address]       Insurance:  AMA/CMS Eval/ Re-eval Auth #/ Referral # Total units  Start date  Expiration date KX Visit limitation?  PT only or  PT+OT? Co-Insurance   CMS 11/4/24 NA    No Prior 47 visits in 2024  100% after deduct    12/5/24            1/9/25            2/10/25            3/10/25                                     POC Start Date POC Expiration Date Signed POC?   11/4/24 12/30/24 Yes   12/5/24 1/30/25 Yes   1/9/25 3/6/25 Pend   2/10/25 4/7/25 Pend   3/10/25 5/5/25 Pend      Date 11/4 12/5 12/10 12/13 12/18 12/19 12/26 12/30 1/2 1/9 1/16 1/23   Visits/Units:  Used 1 2 3 4-KX 5-KX 6-KX 7-KX 8-KX 9 10 11 12   Authed:  Remaining   RE        RE         Date 1/27 1/30 2/3 2/10 2/13 2/20 2/27 3/6 3/10      Visits/Units:  Used 13 14 15 16 17 18 19 20 21      Authed:  Remaining     RE     RE         Manuals 3/10 3/6 2/27 2/20 2/13    21 20 19 18 17    KX KX                      Neuro Re-Ed        Seated add iso        Seated abd stab march        Fwd modified  lunge at rail        Slalom walking through zig zag of cones  10 cones - 30ft need for CS and VCs to improve step length reduce shuffling.    5x 7 cones - 30ft need for CS throughout    3x through course - improved quality each set     Side step at rail  HR support  [id number] HR support and cues to improve step length and reduce posterior WS  2x5 15ft B HR support [id number] B HR support [id number] B   Side step on/off airex        Outdoor amb     SPC and CS - down 1 curb and 200ft   Seated CS rot SNAGS        Fwd step up        SLS hip flex  2x15 B 1 HR support  2x15 B    STS 5x 2x5 2x5 8x 5x   Tandem stance 3 bouts until fatigue B w/CS and by table       TUG        Romberg CS rot + flex/ext     2x5 ea CS   Ther Ex        Education Updated POC  Reviewed updated POC regarding upcoming sx     HEP        6 MWT Performed       5xSTS performed       DGI performed       Seated march  2x20 2lb 2x20 2lb 4x10 2lb 2x10 1lb   Harshil str        Stand ham curl     2x10 B   B Heel raise     2x10   LAQ  20x B 1lb 20x B 1lb 20x B 2lb 2x10 B 1lb

## 2025-03-13 ENCOUNTER — APPOINTMENT (OUTPATIENT)
Dept: PHYSICAL THERAPY | Facility: CLINIC | Age: 75
End: 2025-03-13
Payer: MEDICARE

## 2025-03-17 ENCOUNTER — OFFICE VISIT (OUTPATIENT)
Dept: PHYSICAL THERAPY | Facility: CLINIC | Age: 75
End: 2025-03-17
Payer: MEDICARE

## 2025-03-17 DIAGNOSIS — G62.9 NEUROPATHY: ICD-10-CM

## 2025-03-17 DIAGNOSIS — R26.89 IMBALANCE: ICD-10-CM

## 2025-03-17 DIAGNOSIS — R26.9 GAIT DIFFICULTY: Primary | ICD-10-CM

## 2025-03-17 PROCEDURE — 97112 NEUROMUSCULAR REEDUCATION: CPT | Performed by: PHYSICAL THERAPIST

## 2025-03-17 PROCEDURE — 97110 THERAPEUTIC EXERCISES: CPT | Performed by: PHYSICAL THERAPIST

## 2025-03-17 NOTE — PROGRESS NOTES
Daily Note     Today's date: 3/17/2025  Patient name: Sahara Patel  : 1950  MRN: [id number]  Referring provider: Rubi Bill MD  Dx:   Encounter Diagnosis     ICD-10-CM    1. Gait difficulty  R26.9       2. Imbalance  R26.89       3. Neuropathy  G62.9                      Subjective: Sahara Patel reports over the past week she has been more tired in general.  She had to help care for her grandkids last week which was very difficulty and fatiguing.  She also was sent to cardiologist due to abnormal findings on latest ECG.  She states follow up went well and nothing out of ordinary was found.  She is now scheduled for her thyroid procedure on Tuesday 3/25/25.      Objective: See treatment diary below      Assessment: Tolerated treatment fair. Patient with increased fatigue, evidence for imbalance and decreased activity tolerance today vs prior sessions.  She is to be undergoing surgery next week to address thyroid/parathyroid issues with hope to see improvement in her above symptoms following.      Plan: Hold on PT while recovering from upcoming surgery.  To re-evaluate once cleared to resume following.      Precautions: Imbalance/Falls Risk    Access Code: [email or web address]       Insurance:  AMA/CMS Eval/ Re-eval Auth #/ Referral # Total units  Start date  Expiration date KX Visit limitation?  PT only or  PT+OT? Co-Insurance   CMS 24 NA    No Prior 47 visits in   100% after deduct    12/5/24            1/9/25            2/10/25            3/10/25                                     POC Start Date POC Expiration Date Signed POC?   24 Yes   24 Yes   1/9/25 3/6/25 Pend   2/10/25 4/7/25 Pend   3/10/25 5/5/25 Pend      Date 11/4 12/5 12/10 12/13 12/18 12/19 12/26 12/30 1/2 1/9 1/16 1/23   Visits/Units:  Used 1 2 3 4-KX 5-KX 6-KX 7-KX 8-KX 9 10 11 12   Authed:  Remaining   RE        RE         Date 1/27 1/30 2/3 2/10 2/13 2/20 2/27 3/6 3/10 3/17     Visits/Units:  Used 13  14 15 16 17 18 19 20 21 22     Authed:  Remaining     RE     RE         Manuals 3/17 3/10 3/6 2/27 2/20    22 21 20 19 18    KX KX KX                     Neuro Re-Ed        Slalom walking through zig zag of cones   10 cones - 30ft need for CS and VCs to improve step length reduce shuffling.    5x 7 cones - 30ft need for CS throughout    3x through course - improved quality each set    Side step at rail 5x15ft at rail and CS  HR support  [id number] HR support and cues to improve step length and reduce posterior WS  2x5 15ft B HR support [id number] B   Static stance on airex 3 min w/ close supervision       SLS hip flex   2x15 B 1 HR support  2x15 B   STS 2x then deferred due to fatigue 5x 2x5 2x5 8x   Tandem stance  3 bouts until fatigue B w/CS and by table      TUG        Romberg CS rot + flex/ext        Ther Ex        Education  Updated POC  Reviewed updated POC regarding upcoming sx    HEP        6 MWT  Performed      5xSTS  performed      DGI  performed      Seated march 20x 0lbs  2x20 2lb 2x20 2lb 4x10 2lb   Seated iso hip abd/add 2x10 5s ea       Stand ham curl        B Heel raise Seated 20x       LAQ 20x 0lb  20x B 1lb 20x B 1lb 20x B 2lb

## 2025-03-20 ENCOUNTER — APPOINTMENT (OUTPATIENT)
Dept: PHYSICAL THERAPY | Facility: CLINIC | Age: 75
End: 2025-03-20
Payer: MEDICARE

## 2025-03-24 ENCOUNTER — APPOINTMENT (OUTPATIENT)
Dept: PHYSICAL THERAPY | Facility: CLINIC | Age: 75
End: 2025-03-24
Payer: MEDICARE

## 2025-03-27 ENCOUNTER — APPOINTMENT (OUTPATIENT)
Dept: PHYSICAL THERAPY | Facility: CLINIC | Age: 75
End: 2025-03-27
Payer: MEDICARE

## 2025-05-08 ENCOUNTER — EVALUATION (OUTPATIENT)
Dept: PHYSICAL THERAPY | Facility: CLINIC | Age: 75
End: 2025-05-08
Payer: MEDICARE

## 2025-05-08 DIAGNOSIS — R26.89 IMBALANCE: ICD-10-CM

## 2025-05-08 DIAGNOSIS — G62.9 NEUROPATHY: ICD-10-CM

## 2025-05-08 DIAGNOSIS — R26.9 GAIT DIFFICULTY: Primary | ICD-10-CM

## 2025-05-08 PROCEDURE — 97110 THERAPEUTIC EXERCISES: CPT | Performed by: PHYSICAL THERAPIST

## 2025-05-08 NOTE — PROGRESS NOTES
Re-Eval     Today's date: 2025  Patient name: Sahara Patel  : 1950  MRN: [id number]  Referring provider: Rubi Bill MD  Dx:   Encounter Diagnosis     ICD-10-CM    1. Gait difficulty  R26.9       2. Imbalance  R26.89       3. Neuropathy  G62.9                    Assessment  Current assessment details: Sahara Patel returns to [facility] today for first visit since 3/17/25.  She underwent procedure to remove parathyroid on 3/25/25 and has been now cleared to resume therapy.  During this gap in treatment she reports she feels her balance and mobility has worsened.  She also has been seen by her neurologist recently who is ordering new testing to help rule in/out potential signs of parkinson's and/or hydrocephalus that may be related to her continued deficits in balance and gait.  During today's assessment she performed a dynamic gait index with her lowest recorded score () through her history at [facility] indicating a regression of balance and high risk for falls.  Her quality of gait today also showed signs of decline since last evaluation with increased shuffling, evidence of imbalance, difficulty turning in place, decreased stance time and increased reliance on SPC or therapist assistance.  Her 5x STS assessment scored > double the amount of time up to 48 seconds with increased reliance on UEs and increased frequency of posterior LOB during transfers.  She did not demonstrate any significant decrease in LE MMT vs last evaluation.  Her continued balance and movement disorders she presents with are consistent with neurologic pathology.  Further follow ups with neurologist to help determine root cause is highly recommended.  She meanwhile would benefit to return to [facility] to help address the impairments noted, reduce progression of balance deficits, reduce risk of falls and improve overall functional mobility.       Impairments: abnormal gait, abnormal or restricted ROM, abnormal movement, activity  intolerance, impaired balance, impaired physical strength, lacks appropriate home exercise program, poor posture  and poor body mechanics  Understanding of Dx/Px/POC: good   Prognosis: good    Goals - Updated 5/8/25  STG 2-4 weeks: -   1. Improve 5xSTS score to <30 seconds indicating improve ability to transfer STS.   2. Pt will demonstrate increase DGI score by 1-2 points as evidence of decreased imbalance.   3. Pt will be able to complete 6 minute walk test - to be performed at next sx on 5/13/25    LTG 4-12 weeks: - Updated 5/8/25  1. Pt will improve 6 minute walk test to >1000ft   2. Pt will be I with comprehensive HEP.   3. Pt will demo >14/24 on dynamic gait index.      Plan  Plan details: HEP development, stretching, strengthening, A/AA/PROM, joint mobilizations, posture education, STM/MI as needed to reduce muscle tension, balance and proprioceptive training, muscle reeducation, PLOC discussed and agreed upon with patient.    Patient would benefit from: PT eval and skilled physical therapy  Planned modality interventions: cryotherapy and thermotherapy: hydrocollator packs  Planned therapy interventions: manual therapy, neuromuscular re-education, self care, therapeutic activities, therapeutic exercise and home exercise program  Frequency: 1-2x week  Duration in weeks: 6-12  Treatment plan discussed with: patient      Subjective Evaluation    History of Present Illness  Sahara Patel underwent procedure to remove half of thyroid and one parathyroid on 8/26/24.  During the procedure potential cancer was found in the thyroid and confirmed via biopsy.  She is to have further testing to determine if she will need to have parathyroid removed on opposite site and/or more of her thyroid.  She has also seen a neurologist specializing in movement and balance.  She did not see any evidence of PD, MS or any other conditions but contributes her increase in balance deficits due to her neuropathy, thryoid issues and  "diabetes.  She referred her back to [facility] at this time.    Since last being in therapy at this facility on 8/22/24.  Sahara Patel reports since then she feels significantly more fatigued, more imbalanced and having more difficulty getting around.  She states some days are better/worse than others.  She denies any falls in past month but has been more reliant on utilizing SPC for ambulation.  She has been avoiding walking outside on her own, relies on significant other to go with her for grocery shopping.  She also reports some days she is feeling \"foggy\" which is causing her to avoid driving.    Update 12/5/24:  Sahara Patel reports over the past month she has been experiencing significant fatigue that fluctuates day to day.  She states she is able to only do a couple things before she needs to sit and rest way more frequently than in the past.  She feels her balance is even worse forcing her to rely on leaning on shopping cart while in grocery store.  She was seen by her thyroid specialist who is now referring her to another specialist for thoracic surgery to remove her remaining thyroid and parathyroid due to placement of thyroid and continues signs of cancer.      Update 1/9/25:  Sahara Patel states she feels her endurance is a little better, she feels a little stronger over the past month.  She has been able to do more cooking for her family and not fatiguing as much as she had in the past.  Today however she feels more unstable and fatigued which she notices in her walking.  She is scheduled for follow up with specialist for her parathyroid this upcoming week to determine POC of about potential surgery.    Update 2/10/25:  Sahara Patel returns to surgeon on 2/24/25 to schedule surgery to remove the remaining portion of her thyroid.  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  She is to follow up with neurologist on these findings.  Last week " she had to make a cake for her grandson which took a lot of effort form her and she was extremely fatigued.  She is having to take frequent rest breaks with her normal activities due to fatigue.  She continues with fluctuations in her balance and endurance day to day.    Update 3/10/25:  Sahara Patel reports she has been utilizing her SPC while walking in her home as she has been feeling unsteady compared to previously using no AD.  She states today she woke up very tired and this continues to fluctuate day to day.  She also reports she was asked to watch her grandchildren but she doesn't feel she is strong enough, balanced enough to be able to care for them so deferred.      Update 5/8/25:  Sahara Patel underwent recent procedure on 3/25/25 where the removed the parathyroid but left remaining half of her thyroid.  Following procedure she has had blood work showing her levels have improved to normal levels compared to prior to surgery.  She has been cleared to resume therapy.  She is also scheduled for an ultrsaound of her thryoid for her endocrinologist.  She recently followed up with neurologist who saw parkinsonian features of her gait and is now being sent for follow testing.  They also reviewed her prior brain scan showing possibilities of hydrocephalus.      She reports since her procedure she has felt her balance and overall mobility is either no better or worse since last being in [facility].  She continues to feel unsteady with walking, utilizing her SPC outside of her home.  She reports she feels very unsteady when initially standing and feels it gets slightly better when she starts to move.  She has been doing some of her prior HEPs on her own and feels it helps.      Patient Goals  Patient goals for therapy: improved balance, increased strength and independence with ADLs/IADLs    Social Support  Steps to enter house: yes  4  Stairs in house: yes   4  Lives in: multiple-level home  Lives with:  significant other    Employment status: not working  Exercise history: Pilates - 1-2x/wk (not currently)      Objective     Postural Observations  Seated posture: fair  Standing posture: fair    Neurological Testing     Sensation     Lumbar   Left   Diminished: light touch    Right   Diminished: light touch    Additional Neurological Details  Evidence of 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     Strength/Myotome Testing     Left Hip   Planes of Motion   Flexion: 4+   External rotation: 4+  Internal rotation: 4+    Right Hip   Planes of Motion   Flexion: 4+  External rotation: 4  Internal rotation: 4    Left Knee   Flexion: 5  Extension: 5    Right Knee   Flexion: 5  Extension: 5    Left Ankle/Foot   Dorsiflexion: 5  Plantar flexion: 5    Right Ankle/Foot   Dorsiflexion: 5  Plantar flexion: 5    Ambulation     Ambulation: Stairs   Ascend stairs: independent  Pattern: reciprocal  Railings: one rail  Descend stairs: independent  Pattern: intermittently non-reciprocal - due to FOF  Railings: one rail      Observational Gait     Patient utilizing SPC for ambulation.  She demonstrates decreased stride length and walking speed, limited bilateral foot clearance with shuffling of her feet, left LE externally rotated.  She also demonstrated LOB with pivoting/turning, difficulty initiating movement and uncontrolled changes in her pace due to signs of forward LOB.  Evidence of overall increased risk of falls and need for close supervision.        DGI  MDC: vestibular - 4 pts  MDC: geriatric/community - 3 pts  Falls risk <19/24 5xSTS: Lucila et al 2010  MDC: 2.3 sec  Age Norms:  60-69: 11.1 sec  70-79: 12.6 sec  80-89: 14.8 sec   6 Minute Walk Test  Age Norms  60-69: M - 1876 ft (571.80 m)  F - 1765 ft (537.98 m)  70-79: M - 1729 ft (527.00 m)  F - 1545 ft (470.92 m)  80-89: M - 1368 ft (416.97 m)  F - 1286 ft (391.97 m) mCTSIB  Norm: 20-60 yrs  Eyes open firm: norm sway 0.21-0.48  Eyes closed firm: norm sway 0.48-0.99  Eyes  open foam: norm sway 0.38-0.71  Eyes closed foam: norm sway 0.70-2.22       Outcome Measures 1/22/24   3/21/2024 4/22/2024 11/4/24 1/9/25 2/10/25 3/10/25 5/8/25   5xSTS 22.06s 15.44s  Trial 1: 23.43s  Trial 2: 19.09s    UE assist 23.10 no UE assist - slight posterior LOB 48.34s initially no UE support but need for min assist by PT on last rep to prevent posterior LOB 23.00s with no UE support but 1 LOB posteriorly catching balance with UE on arm rest. 19.97s with no UE support but did lose balance posteriorly falling into chair on last repetition. 46.88s with increased need for UE support and posterior LOB   6MWT 1100ft 1250ft 1LOB while turning  1300ft  440ft @3:10 600ft in 5 min prior to stopping test - need for close supervision throughout due to evidence of imbalance 950ft completed full 6 minutes while needing verbal cues to reduce shuffling and need for close supervision due to imbalance 515ft completed in 3:55 having to stop due to fatigue.  She requires continued CS throughout and cues to improve foot clearance. To be completed next sx   2MWT 375ft 400ft 450ft  300ft 275ft 325ft 325ft To be completed next sx   DGI 19/24 21/24 21/24 9/24 9/24 11/24 13/24 8/24            Precautions: Imbalance/Falls Risk    Access Code: [email or web address]       Insurance:  AMA/CMS Eval/ Re-eval Auth #/ Referral # Total units  Start date  Expiration date KX Visit limitation?  PT only or  PT+OT? Co-Insurance   CMS 11/4/24 NA    No Prior 47 visits in 2024  100% after deduct    12/5/24            1/9/25            2/10/25            3/10/25            5/8/25                         POC Start Date POC Expiration Date Signed POC?   11/4/24 12/30/24 Yes   12/5/24 1/30/25 Yes   1/9/25 3/6/25 Pend   2/10/25 4/7/25 Pend   3/10/25 5/5/25 Pend      Date 11/4 12/5 12/10 12/13 12/18 12/19 12/26 12/30 1/2 1/9 1/16 1/23   Visits/Units:  Used 1 2 3 4-KX 5-KX 6-KX 7-KX 8-KX 9 10 11 12   Authed:  Remaining   RE        RE         Date 1/27 1/30 2/3 2/10  2/13 2/20 2/27 3/6 3/10 3/17 5/8    Visits/Units:  Used 13 14 15 16 17 18 19 20 21 22 23    Authed:  Remaining     RE     RE  RE       Manuals 5/8 3/17 3/10 3/6 2/27    23 22 21 20 19    KX KX KX KX                    Neuro Re-Ed        Sl[place]om walking through zig zag of cones    10 cones - 30ft need for CS and VCs to improve step length reduce shuffling.    5x 7 cones - 30ft need for CS throughout    3x through course - improved quality each set   Side step at rail  5x15ft at rail and CS  HR support  [id number] HR support and cues to improve step length and reduce posterior WS  2x5 15ft B   Static stance on airex  3 min w/ close supervision      SLS hip flex    2x15 B 1 HR support    STS  2x then deferred due to fatigue 5x 2x5 2x5   Tandem stance   3 bouts until fatigue B w/CS and by table     TUG        Romberg CS rot + flex/ext        Ther Ex        Education Updated POC  Updated POC  Reviewed updated POC regarding upcoming sx   HEP        6 MWT   Performed     5xSTS performed  performed     DGI performed  performed     Seated march  20x 0lbs  2x20 2lb 2x20 2lb   Seated iso hip abd/add  2x10 5s ea      Stand ham curl        B Heel raise  Seated 20x      LAQ  20x 0lb  20x B 1lb 20x B 1lb

## 2025-05-08 NOTE — LETTER
May 9, 2025    Rubi Bill MD  [address] # [facility]  [place] [address]    Patient: Sahara Patel   YOB: 1950   Date of Visit: 2025     Encounter Diagnosis     ICD-10-CM    1. Gait difficulty  R26.9       2. Imbalance  R26.89       3. Neuropathy  G62.9           Dear Dr. Rubi Bill MD:    Thank you for your recent referral of Sahara Patel. Please review the attached evaluation summary from Sahara's recent visit.     Please verify that you agree with the plan of care by signing the attached order.     If you have any questions or concerns, please do not hesitate to call.     I sincerely appreciate the opportunity to share in the care of one of your patients and hope to have another opportunity to work with you in the near future.       Sincerely,    Devon Robles, PT      Referring Provider:      I certify that I have read the below Plan of Care and certify the need for these services furnished under this plan of treatment while under my care.                    Rubi Bill MD  [address]  [place] [address]  Via Fax: [phone]          Re-Eval     Today's date: 2025  Patient name: Sahara Patel  : 1950  MRN: [id number]  Referring provider: Rubi Bill MD  Dx:   Encounter Diagnosis     ICD-10-CM    1. Gait difficulty  R26.9       2. Imbalance  R26.89       3. Neuropathy  G62.9                    Assessment  Current assessment details: Sahara Patel returns to [facility] today for first visit since 3/17/25.  She underwent procedure to remove parathyroid on 3/25/25 and has been now cleared to resume therapy.  During this gap in treatment she reports she feels her balance and mobility has worsened.  She also has been seen by her neurologist recently who is ordering new testing to help rule in/out potential signs of parkinson's and/or hydrocephalus that may be related to her continued deficits in balance and gait.  During today's assessment she  performed a dynamic gait index with her lowest recorded score (8/24) through her history at OPPT indicating a regression of balance and high risk for falls.  Her quality of gait today also showed signs of decline since last evaluation with increased shuffling, evidence of imbalance, difficulty turning in place, decreased stance time and increased reliance on SPC or therapist assistance.  Her 5x STS assessment scored > double the amount of time up to 48 seconds with increased reliance on UEs and increased frequency of posterior LOB during transfers.  She did not demonstrate any significant decrease in LE MMT vs last evaluation.  Her continued balance and movement disorders she presents with are consistent with neurologic pathology.  Further follow ups with neurologist to help determine root cause is highly recommended.  She meanwhile would benefit to return to [facility]PP[facility] to help address the impairments noted, reduce progression of balance deficits, reduce risk of falls and improve overall functional mobility.       Impairments: abnormal gait, abnormal or restricted ROM, abnormal movement, activity intolerance, impaired balance, impaired physical strength, lacks appropriate home exercise program, poor posture  and poor body mechanics  Understanding of Dx/Px/POC: good   Prognosis: good    Goals - Updated 5/8/25  STG 2-4 weeks: -   1. Improve 5xSTS score to <30 seconds indicating improve ability to transfer STS.   2. Pt will demonstrate increase DGI score by 1-2 points as evidence of decreased imbalance.   3. Pt will be able to complete 6 minute walk test - to be performed at next sx on 5/13/25    LTG 4-12 weeks: - Updated 5/8/25  1. Pt will improve 6 minute walk test to >1000ft   2. Pt will be I with comprehensive HEP.   3. Pt will demo >14/24 on dynamic gait index.      Plan  Plan details: HEP development, stretching, strengthening, A/AA/PROM, joint mobilizations, posture education, STM/MI as needed to reduce muscle  "tension, balance and proprioceptive training, muscle reeducation, PLOC discussed and agreed upon with patient.    Patient would benefit from: PT eval and skilled physical therapy  Planned modality interventions: cryotherapy and thermotherapy: hydrocollator packs  Planned therapy interventions: manual therapy, neuromuscular re-education, self care, therapeutic activities, therapeutic exercise and home exercise program  Frequency: 1-2x week  Duration in weeks: 6-12  Treatment plan discussed with: patient      Subjective Evaluation    History of Present Illness  Sahara Patel underwent procedure to remove half of thyroid and one parathyroid on 8/26/24.  During the procedure potential cancer was found in the thyroid and confirmed via biopsy.  She is to have further testing to determine if she will need to have parathyroid removed on opposite site and/or more of her thyroid.  She has also seen a neurologist specializing in movement and balance.  She did not see any evidence of PD, MS or any other conditions but contributes her increase in balance deficits due to her neuropathy, thryoid issues and diabetes.  She referred her back to OPPT at this time.    Since last being in therapy at this facility on 8/22/24.  Sahara Patel reports since then she feels significantly more fatigued, more imbalanced and having more difficulty getting around.  She states some days are better/worse than others.  She denies any falls in past month but has been more reliant on utilizing SPC for ambulation.  She has been avoiding walking outside on her own, relies on significant other to go with her for grocery shopping.  She also reports some days she is feeling \"foggy\" which is causing her to avoid driving.    Update 12/5/24:  Sahara Patel reports over the past month she has been experiencing significant fatigue that fluctuates day to day.  She states she is able to only do a couple things before she needs to sit and rest way more " frequently than in the past.  She feels her balance is even worse forcing her to rely on leaning on shopping cart while in grocery store.  She was seen by her thyroid specialist who is now referring her to another specialist for thoracic surgery to remove her remaining thyroid and parathyroid due to placement of thyroid and continues signs of cancer.      Update 1/9/25:  Sahara Patel states she feels her endurance is a little better, she feels a little stronger over the past month.  She has been able to do more cooking for her family and not fatiguing as much as she had in the past.  Today however she feels more unstable and fatigued which she notices in her walking.  She is scheduled for follow up with specialist for her parathyroid this upcoming week to determine POC of about potential surgery.    Update 2/10/25:  Sahara Patel returns to surgeon on 2/24/25 to schedule surgery to remove the remaining portion of her thyroid.  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  She is to follow up with neurologist on these findings.  Last week she had to make a cake for her grandson which took a lot of effort form her and she was extremely fatigued.  She is having to take frequent rest breaks with her normal activities due to fatigue.  She continues with fluctuations in her balance and endurance day to day.    Update 3/10/25:  Sahara Patel reports she has been utilizing her SPC while walking in her home as she has been feeling unsteady compared to previously using no AD.  She states today she woke up very tired and this continues to fluctuate day to day.  She also reports she was asked to watch her grandchildren but she doesn't feel she is strong enough, balanced enough to be able to care for them so deferred.      Update 5/8/25:  Sahara Patel underwent recent procedure on 3/25/25 where the removed the parathyroid but left remaining half of her thyroid.   Following procedure she has had blood work showing her levels have improved to normal levels compared to prior to surgery.  She has been cleared to resume therapy.  She is also scheduled for an ultrsaound of her thryoid for her endocrinologist.  She recently followed up with neurologist who saw parkinsonian features of her gait and is now being sent for follow testing.  They also reviewed her prior brain scan showing possibilities of hydrocephalus.      She reports since her procedure she has felt her balance and overall mobility is either no better or worse since last being in OPPT.  She continues to feel unsteady with walking, utilizing her SPC outside of her home.  She reports she feels very unsteady when initially standing and feels it gets slightly better when she starts to move.  She has been doing some of her prior HEPs on her own and feels it helps.      Patient Goals  Patient goals for therapy: improved balance, increased strength and independence with ADLs/IADLs    Social Support  Steps to enter house: yes  4  Stairs in house: yes   4  Lives in: multiple-level home  Lives with: significant other    Employment status: not working  Exercise history: Pilates - 1-2x/wk (not currently)      Objective     Postural Observations  Seated posture: fair  Standing posture: fair    Neurological Testing     Sensation     Lumbar   Left   Diminished: light touch    Right   Diminished: light touch    Additional Neurological Details  Evidence of 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     Strength/Myotome Testing     Left Hip   Planes of Motion   Flexion: 4+   External rotation: 4+  Internal rotation: 4+    Right Hip   Planes of Motion   Flexion: 4+  External rotation: 4  Internal rotation: 4    Left Knee   Flexion: 5  Extension: 5    Right Knee   Flexion: 5  Extension: 5    Left Ankle/Foot   Dorsiflexion: 5  Plantar flexion: 5    Right Ankle/Foot   Dorsiflexion: 5  Plantar flexion: 5    Ambulation     Ambulation: Stairs    Ascend stairs: independent  Pattern: reciprocal  Railings: one rail  Descend stairs: independent  Pattern: intermittently non-reciprocal - due to FOF  Railings: one rail      Observational Gait     Patient utilizing SPC for ambulation.  She demonstrates decreased stride length and walking speed, limited bilateral foot clearance with shuffling of her feet, left LE externally rotated.  She also demonstrated LOB with pivoting/turning, difficulty initiating movement and uncontrolled changes in her pace due to signs of forward LOB.  Evidence of overall increased risk of falls and need for close supervision.        DGI  MDC: vestibular - 4 pts  MDC: geriatric/community - 3 pts  Falls risk <19/24 5xSTS: Lucila et al 2010  MDC: 2.3 sec  Age Norms:  60-69: 11.1 sec  70-79: 12.6 sec  80-89: 14.8 sec   6 Minute Walk Test  Age Norms  60-69: M - 1876 ft (571.80 m)  F - 1765 ft (537.98 m)  70-79: M - 1729 ft (527.00 m)  F - 1545 ft (470.92 m)  80-89: M - 1368 ft (416.97 m)  F - 1286 ft (391.97 m) mCTSIB  Norm: 20-60 yrs  Eyes open firm: norm sway 0.21-0.48  Eyes closed firm: norm sway 0.48-0.99  Eyes open foam: norm sway 0.38-0.71  Eyes closed foam: norm sway 0.70-2.22       Outcome Measures 1/22/24   3/21/2024 4/22/2024 11/4/24 1/9/25 2/10/25 3/10/25 5/8/25   5xSTS 22.06s 15.44s  Trial 1: 23.43s  Trial 2: 19.09s    UE assist 23.10 no UE assist - slight posterior LOB 48.34s initially no UE support but need for min assist by PT on last rep to prevent posterior LOB 23.00s with no UE support but 1 LOB posteriorly catching balance with UE on arm rest. 19.97s with no UE support but did lose balance posteriorly falling into chair on last repetition. 46.88s with increased need for UE support and posterior LOB   6MWT 1100ft 1250ft 1LOB while turning  1300ft  440ft @3:10 600ft in 5 min prior to stopping test - need for close supervision throughout due to evidence of imbalance 950ft completed full 6 minutes while needing verbal cues to  reduce shuffling and need for close supervision due to imbalance 515ft completed in 3:55 having to stop due to fatigue.  She requires continued CS throughout and cues to improve foot clearance. To be completed next sx   2MWT 375ft 400ft 450ft  300ft 275ft 325ft 325ft To be completed next sx   DGI 19/24 21/24 21/24 9/24 9/24 11/24 13/24 8/24            Precautions: Imbalance/Falls Risk    Access Code: [email or web address]       Insurance:  A/CMS Eval/ Re-eval Auth #/ Referral # Total units  Start date  Expiration date KX Visit limitation?  PT only or  PT+OT? Co-Insurance   CMS 11/4/24 NA    No Prior 47 visits in 2024  100% after deduct    12/5/24            1/9/25            2/10/25            3/10/25            5/8/25                         POC Start Date POC Expiration Date Signed POC?   11/4/24 12/30/24 Yes   12/5/24 1/30/25 Yes   1/9/25 3/6/25 Pend   2/10/25 4/7/25 Pend   3/10/25 5/5/25 Pend      Date 11/4 12/5 12/10 12/13 12/18 12/19 12/26 12/30 1/2 1/9 1/16 1/23   Visits/Units:  Used 1 2 3 4-KX 5-KX 6-KX 7-KX 8-KX 9 10 11 12   Authed:  Remaining   RE        RE         Date 1/27 1/30 2/3 2/10 2/13 2/20 2/27 3/6 3/10 3/17 5/8    Visits/Units:  Used 13 14 15 16 17 18 19 20 21 22 23    Authed:  Remaining     RE     RE  RE       Manuals 5/8 3/17 3/10 3/6 2/27    23 22 21 20 19    KX KX KX KX                    Neuro Re-Ed        Slalom walking through zig zag of cones    10 cones - 30ft need for CS and VCs to improve step length reduce shuffling.    5x 7 cones - 30ft need for CS throughout    3x through course - improved quality each set   Side step at rail  5x15ft at rail and CS  HR support  [id number] HR support and cues to improve step length and reduce posterior WS  2x5 15ft B   Static stance on airex  3 min w/ close supervision      SLS hip flex    2x15 B 1 HR support    STS  2x then deferred due to fatigue 5x 2x5 2x5   Tandem stance   3 bouts until fatigue B w/CS and by table     TUG        Romberg CS rot + flex/ext         Ther Ex        Education Updated POC  Updated POC  Reviewed updated POC regarding upcoming sx   HEP        6 MWT   Performed     5xSTS performed  performed     DGI performed  performed     Seated march  20x 0lbs  2x20 2lb 2x20 2lb   Seated iso hip abd/add  2x10 5s ea      Stand ham curl        B Heel raise  Seated 20x      LAQ  20x 0lb  20x B 1lb 20x B 1lb

## 2025-05-13 ENCOUNTER — OFFICE VISIT (OUTPATIENT)
Dept: PHYSICAL THERAPY | Facility: CLINIC | Age: 75
End: 2025-05-13
Payer: MEDICARE

## 2025-05-13 DIAGNOSIS — R26.9 GAIT DIFFICULTY: Primary | ICD-10-CM

## 2025-05-13 DIAGNOSIS — R26.89 IMBALANCE: ICD-10-CM

## 2025-05-13 DIAGNOSIS — G62.9 NEUROPATHY: ICD-10-CM

## 2025-05-13 PROCEDURE — 97110 THERAPEUTIC EXERCISES: CPT | Performed by: PHYSICAL THERAPIST

## 2025-05-13 PROCEDURE — 97112 NEUROMUSCULAR REEDUCATION: CPT | Performed by: PHYSICAL THERAPIST

## 2025-05-13 NOTE — PROGRESS NOTES
Daily Note     Today's date: 2025  Patient name: Sahara Patel  : 1950  MRN: [id number]  Referring provider: Rubi Bill MD  Dx:   Encounter Diagnosis     ICD-10-CM    1. Gait difficulty  R26.9       2. Imbalance  R26.89       3. Neuropathy  G62.9                      Subjective: Sahara Patel reports she had her best day in a while yesterday and was very active.  Today however she is tired and noticing her walking is not as good.       Objective: See treatment diary below    Outcome Measures 1/22/24   3/21/2024 2024 11/4/24 1/9/25 2/10/25 3/10/25 5/8/25   5xSTS 22.06s 15.44s  Trial 1: 23.43s  Trial 2: 19.09s    UE assist 23.10 no UE assist - slight posterior LOB 48.34s initially no UE support but need for min assist by PT on last rep to prevent posterior LOB 23.00s with no UE support but 1 LOB posteriorly catching balance with UE on arm rest. 19.97s with no UE support but did lose balance posteriorly falling into chair on last repetition. 46.88s with increased need for UE support and posterior LOB   6MWT 1100ft 1250ft 1LOB while turning  1300ft  440ft @3:10 600ft in 5 min prior to stopping test - need for close supervision throughout due to evidence of imbalance 950ft completed full 6 minutes while needing verbal cues to reduce shuffling and need for close supervision due to imbalance 515ft completed in 3:55 having to stop due to fatigue.  She requires continued CS throughout and cues to improve foot clearance. 800ft need for CS throughout and several standing rest breaks   2MWT 375ft 400ft 450ft  300ft 275ft 325ft 325ft 275ft   DGI      Assessment: Tolerated treatment well. Patient was able to complete a full 6MWT which she was unable to perform at last evaluation but continues with evidence of decreased clearance of left foot with need for consistent cues to improve step length.      Plan: Continue per plan of care.      Precautions:  Imbalance/Falls Risk    Access Code: [email or web address]       Insurance:  AMA/CMS Eval/ Re-eval Auth #/ Referral # Total units  Start date  Expiration date KX Visit limitation?  PT only or  PT+OT? Co-Insurance   CMS 11/4/24 NA    No Prior 47 visits in 2024  100% after deduct    12/5/24            1/9/25            2/10/25            3/10/25            5/8/25                         POC Start Date POC Expiration Date Signed POC?   11/4/24 12/30/24 Yes   12/5/24 1/30/25 Yes   1/9/25 3/6/25 Pend   2/10/25 4/7/25 Pend   3/10/25 5/5/25 Pend      Date 11/4 12/5 12/10 12/13 12/18 12/19 12/26 12/30 1/2 1/9 1/16 1/23   Visits/Units:  Used 1 2 3 4-KX 5-KX 6-KX 7-KX 8-KX 9 10 11 12   Authed:  Remaining   RE        RE         Date 1/27 1/30 2/3 2/10 2/13 2/20 2/27 3/6 3/10 3/17 5/8 5/13   Visits/Units:  Used 13 14 15 16 17 18 19 20 21 22 23 24   Authed:  Remaining     RE     RE  RE       Manuals 5/13 5/8 3/17 3/10 3/6    24 23 22 21 20    KX KX KX KX KX                   Neuro Re-Ed        Slalom walking through zig zag of cones     10 cones - 30ft need for CS and VCs to improve step length reduce shuffling.    5x   Side step at rail   5x15ft at rail and CS  HR support  [id number]   Static stance on airex   3 min w/ close supervision     SLS hip flex Tap cone  20x B    2x15 B 1 HR support   STS 5x  2x then deferred due to fatigue 5x 2x5   Tandem stance    3 bouts until fatigue B w/CS and by table    TUG        Romberg EC 3x30s CS +  1/2 Rom EC  2x30s B       Ther Ex        Education  Updated POC  Updated POC    HEP        6 MWT performed   Performed    5xSTS  performed  performed    DGI  performed  performed    Seated march 30x  20x 0lbs  2x20 2lb   Seated iso hip abd/add   2x10 5s ea     Stand ham curl        B Heel raise 20x standing  Seated 20x     LAQ   20x 0lb  20x B 1lb

## 2025-05-15 ENCOUNTER — APPOINTMENT (OUTPATIENT)
Dept: PHYSICAL THERAPY | Facility: CLINIC | Age: 75
End: 2025-05-15
Payer: MEDICARE

## 2025-05-19 ENCOUNTER — OFFICE VISIT (OUTPATIENT)
Dept: PHYSICAL THERAPY | Facility: CLINIC | Age: 75
End: 2025-05-19
Payer: MEDICARE

## 2025-05-19 DIAGNOSIS — G62.9 NEUROPATHY: ICD-10-CM

## 2025-05-19 DIAGNOSIS — R26.9 GAIT DIFFICULTY: Primary | ICD-10-CM

## 2025-05-19 DIAGNOSIS — R26.89 IMBALANCE: ICD-10-CM

## 2025-05-19 PROCEDURE — 97112 NEUROMUSCULAR REEDUCATION: CPT | Performed by: PHYSICAL THERAPIST

## 2025-05-19 PROCEDURE — 97110 THERAPEUTIC EXERCISES: CPT | Performed by: PHYSICAL THERAPIST

## 2025-05-19 NOTE — PROGRESS NOTES
Daily Note     Today's date: 2025  Patient name: Sahara Patel  : 1950  MRN: [id number]  Referring provider: Rubi Bill MD  Dx:   Encounter Diagnosis     ICD-10-CM    1. Gait difficulty  R26.9       2. Imbalance  R26.89       3. Neuropathy  G62.9                      Subjective: Sahara Patel reports she got results from recent scan of brain showing no signs of parkinson's  She has folow up appt with neurologist regarding potential hydrocephalus      Objective: See treatment diary below      Assessment: Tolerated treatment well. Patient demonstrated fatigue post treatment, exhibited good technique with therapeutic exercises, and would benefit from continued PT.  She was limited during STS due to weakness and left knee pain.  She also continues with tendency for shuffling gait with need for continued verbal cues to improve.      Plan: Continue per plan of care.      Precautions: Imbalance/Falls Risk    Access Code: [email or web address]       Insurance:  A/CMS Eval/ Re-eval Auth #/ Referral # Total units  Start date  Expiration date KX Visit limitation?  PT only or  PT+OT? Co-Insurance   CMS 24 NA    No Prior 47 visits in   100% after deduct    12/5/24            1/9/25            2/10/25            3/10/25            5/8/25                         POC Start Date POC Expiration Date Signed POC?   24 Yes   24 Yes   1/9/25 3/6/25 Pend   2/10/25 4/7/25 Pend   3/10/25 5/5/25 Pend      Date 11/4 12/5 12/10 12/13 12/18 12/19 12/26 12/30 1/2 1/9 1/16 1/23   Visits/Units:  Used 1 2 3 4-KX 5-KX 6-KX 7-KX 8-KX 9 10 11 12   Authed:  Remaining   RE        RE         Date 1/27 1/30 2/3 2/10 2/13 2/20 2/27 3/6 3/10 3/17 5/8 5/13   Visits/Units:  Used 13 14 15 16 17 18 19 20 21 22 23 24   Authed:  Remaining     RE     RE  RE        Date               Visits/Units:  Used 25              Authed:  Remaining                  Manuals 5/19 5/13 5/8 3/17 3/10    25 24 23 22 21    KX  KX KX KX KX                   Neuro Re-Ed        Slalom walking through zig zag of cones        Side step at rail [id number] B   5x15ft at rail and CS    Static stance on airex    3 min w/ close supervision    SLS hip flex Skinny cone  20x B - 1 HR support Tap cone  20x B      STS 5x need for UE support 5x  2x then deferred due to fatigue 5x   Tandem stance     3 bouts until fatigue B w/CS and by table   TUG        Romberg EC  3x30s CS +  1/2 Rom EC  2x30s B      Ther Ex        Education   Updated POC  Updated POC   HEP        6 MWT  performed   Performed   5xSTS   performed  performed   DGI   performed  performed   Seated march 30x 30x  20x 0lbs    Seated iso hip abd/add    2x10 5s ea    Stand ham curl 20x B       B Heel raise  20x standing  Seated 20x    LAQ    20x 0lb

## 2025-05-23 ENCOUNTER — OFFICE VISIT (OUTPATIENT)
Dept: PHYSICAL THERAPY | Facility: CLINIC | Age: 75
End: 2025-05-23
Payer: MEDICARE

## 2025-05-23 DIAGNOSIS — R26.89 IMBALANCE: ICD-10-CM

## 2025-05-23 DIAGNOSIS — G62.9 NEUROPATHY: ICD-10-CM

## 2025-05-23 DIAGNOSIS — R26.9 GAIT DIFFICULTY: Primary | ICD-10-CM

## 2025-05-23 PROCEDURE — 97112 NEUROMUSCULAR REEDUCATION: CPT | Performed by: PHYSICAL THERAPIST

## 2025-05-23 PROCEDURE — 97110 THERAPEUTIC EXERCISES: CPT | Performed by: PHYSICAL THERAPIST

## 2025-05-23 NOTE — PROGRESS NOTES
Daily Note     Today's date: 2025  Patient name: Sahara Patel  : 1950  MRN: [id number]  Referring provider: Rubi Bill MD  Dx:   Encounter Diagnosis     ICD-10-CM    1. Gait difficulty  R26.9       2. Imbalance  R26.89       3. Neuropathy  G62.9                      Subjective: Sahara Patel reports her balance and walking is harder first thing in the morning.       Objective: See treatment diary below      Assessment: Tolerated treatment well. Patient demonstrated fatigue post treatment, exhibited good technique with therapeutic exercises, and would benefit from continued PT.  She continues with tendency for shuffling gait that is exacerbated when fatigued or when in small space.  She did demonstrate improvement following verbal cues to improve foot clearance as well as training for turning in place focused on increased SLS time.      Plan: Continue per plan of care.      Precautions: Imbalance/Falls Risk    Access Code: [email or web address]       Insurance:  Davis/CMS Eval/ Re-eval Auth #/ Referral # Total units  Start date  Expiration date KX Visit limitation?  PT only or  PT+OT? Co-Insurance   CMS 24 NA    No Prior 47 visits in   100% after deduct    12/5/24            1/9/25            2/10/25            3/10/25            5/8/25                         POC Start Date POC Expiration Date Signed POC?   24 Yes   24 Yes   1/9/25 3/6/25 Pend   2/10/25 4/7/25 Pend   3/10/25 5/5/25 Pend      Date 11/4 12/5 12/10 12/13 12/18 12/19 12/26 12/30 1/2 1/9 1/16 1/23   Visits/Units:  Used 1 2 3 4-KX 5-KX 6-KX 7-KX 8-KX 9 10 11 12   Authed:  Remaining   RE        RE         Date 1/27 1/30 2/3 2/10 2/13 2/20 2/27 3/6 3/10 3/17 5/8 5/13   Visits/Units:  Used 13 14 15 16 17 18 19 20 21 22 23 24   Authed:  Remaining     RE     RE  RE        Date              Visits/Units:  Used              Authed:  Remaining                  Manuals 5/23 5/19 5/13 5/8 3/17    26 25  24 23 22    KX KX KX KX KX                   Neuro Re-Ed        Turn in place 180 At railing and close supervision  10x B       Side step at rail [id number] B [id number] B   5x15ft at rail and CS   Static stance on airex     3 min w/ close supervision   SLS hip flex  Skinny cone  20x B - 1 HR support Tap cone  20x B     STS 10x/5x from chair + airex 5x need for UE support 5x  2x then deferred due to fatigue   Ambulation then approach chair into stand to sit Training for improved quality of movements, reduced shuffling to improve safety.       TUG        Romberg EC   3x30s CS +  1/2 Rom EC  2x30s B     Ther Ex        Education    Updated POC    HEP        6 MWT   performed     5xSTS    performed    DGI    performed    Stand hip abd/ext 2x10 B ea       Seated march 30x 30x 30x  20x 0lbs   Seated iso hip abd/add Add 20x5s    2x10 5s ea   Stand ham curl  20x B      B Heel raise   20x standing  Seated 20x   LAQ     20x 0lb

## 2025-05-29 ENCOUNTER — OFFICE VISIT (OUTPATIENT)
Dept: PHYSICAL THERAPY | Facility: CLINIC | Age: 75
End: 2025-05-29
Payer: MEDICARE

## 2025-05-29 DIAGNOSIS — G62.9 NEUROPATHY: ICD-10-CM

## 2025-05-29 DIAGNOSIS — R26.9 GAIT DIFFICULTY: Primary | ICD-10-CM

## 2025-05-29 DIAGNOSIS — R26.89 IMBALANCE: ICD-10-CM

## 2025-05-29 PROCEDURE — 97112 NEUROMUSCULAR REEDUCATION: CPT | Performed by: PHYSICAL THERAPIST

## 2025-05-29 PROCEDURE — 97110 THERAPEUTIC EXERCISES: CPT | Performed by: PHYSICAL THERAPIST

## 2025-05-29 NOTE — PROGRESS NOTES
PT Evaluation     Today's date: 2025  Patient name: Sahara Patel  : 1950  MRN: [id number]  Referring provider: Rubi Bill MD  Dx:   Encounter Diagnosis     ICD-10-CM    1. Gait difficulty  R26.9       2. Imbalance  R26.89       3. Neuropathy  G62.9                      Assessment  Impairments: abnormal gait, abnormal or restricted ROM, abnormal movement, activity intolerance, impaired physical strength, lacks appropriate home exercise program, pain with function, weight-bearing intolerance and poor body mechanics    Assessment details: Sahara Patel is a 74 y.o. female  who presents with right knee pain with acute onst after fall.  Sh currently wit left kneepain, decreased strength, decreased ROM, ambulatory dysfunction, tttp at medial jt line and numbness that is restricting ADL's, prolonged standing, transfers, functional mobility, recreational activities. Patient's signs and symptoms are consistent with .  Patient would benefit from skilled physical therapy services to address their aforementioned functional limitations and progress towards prior level of function and independence with home exercise program.           Understanding of Dx/Px/POC: good     Prognosis: good    Goals  STG: to be achieved within 2-4 weeks  1. Pt will be able to ambulate community distances with normal heel to toe pattern. - In Progress   2. Improve strength so that pt will be be able to perform sit to stand transfers without UE support. -In Progress   3. Improve knee ROM so they can independently don/doff shoes. met  4. Pt will be I with HEP   5. Pt will demo 50% inc in knee AROM  6. Kfhskf - In Progress In Progress In Progress  .inpro    LTG:  to be achieved within 4-8 weeks - In Progress   1. Pt will be able to negotiate stairs reciprocally without hand rail assistance.  2. Improve SLS balance with EC to greater than 15 seconds.  3. Pt will be able to ambulate without time restrictions pain free.  4.  "Restore knee AROM to WNL in all planes.  5. Pt will demo knee strength WNL to return to PLOF.        Plan  Patient would benefit from: PT eval and skilled physical therapy  Planned modality interventions: cryotherapy    Planned therapy interventions: neuromuscular re-education, self care, therapeutic activities, therapeutic exercise, home exercise program, joint mobilization, balance, gait training, flexibility, strengthening, stretching, patient education, manual therapy and graded activity    Frequency: 2x week  Duration in weeks: 6  Treatment plan discussed with: patient  Plan details: HEP development, stretching, strengthening, A/AA/PROM, joint mobilizations, posture education, STM/MI as needed to reduce muscle tension, muscle reeducation, PLOC discussed and agreed upon with patient.      Subjective Evaluation    History of Present Illness  Mechanism of injury: Sahara Kielberman ***              Pain Location: ***  Pain Type: ***  Worst: ***/10  Best: ***/10  Current: ***/10       Objective     Observations     Right Knee   Positive for incision. Negative for edema and effusion.     Neurological Testing     Sensation     Knee   Left Knee   Intact: Light touch    Right Knee   Intact: light touch     Tests     Right Knee   Positive lateral Pan, patellar apprehension and patella-femoral grind.   Negative active quad, anterior drawer, anterior Lachman, Apley's compression, Apley's distraction, bounce home, medial Pan, patellar compression, spring and valgus stress test at 0 degrees.     Functional Assessment        Forward Step Up 8\"   Left Leg  Valgus: fkjskhfskfs.         *** ***    Left Right   Single Leg Hop        MMT IE IE      Left Right Left Right   Hip Flex       Hip Abd       Hip IR       Hip ER                ROM IE IE      Left Right Left Right   Hip Flex       Hip Abd       Hip IR       Hip ER                        Precautions: ***      Manuals                              Neuro Re-Ed          "                                       Ther Ex                                                      Ther Activity                  Gait Training                  Modalities

## 2025-05-29 NOTE — PROGRESS NOTES
PT Evaluation     Today's date: 2025  Patient name: Sahara Patel  : 1950  MRN: [id number]  Referring provider: Rubi Bill MD  Dx:   Encounter Diagnosis     ICD-10-CM    1. Gait difficulty  R26.9       2. Imbalance  R26.89       3. Neuropathy  G62.9                      Assessment/Plan    Subjective    Objective           Precautions: ***       Insurance:  AMA/CMS Eval/ Re-eval Auth #/ Referral # Total units or visits Start date  Expiration date KX? Visit limitation?  PT only or  PT+OT? Co-Insurance   CMS 25 NA       $0                 POC Start Date POC Expiration Date Signed POC?   5/29/25 7/10/25 Pend      Date               Visits/Units: NA Used IE              Authed:  Remaining                     Manuals         IE                     Neuro Re-Ed                                                Ther Ex                                                      Ther Activity                  Gait Training                  Modalities

## 2025-05-29 NOTE — PROGRESS NOTES
Daily Note     Today's date: 2025  Patient name: Sahara Patel  : 1950  MRN: [id number]  Referring provider: Rubi Bill MD  Dx:   Encounter Diagnosis     ICD-10-CM    1. Gait difficulty  R26.9       2. Imbalance  R26.89       3. Neuropathy  G62.9                      Subjective: Sahara Patel reports she was able to bake her banana bread with need for a couple breaks throughout.  She states she was previously unable to do this due to fatigue.        Objective: See treatment diary below      Assessment: Tolerated treatment well. Patient demonstrated fatigue post treatment and would benefit from continued PT as she continues need for consistent cues to improve step length and foot clearance to help reduce risk of falls.  She also continues with low endurance and fluctuating activity tolerance session to session.       Plan: Continue per plan of care.      Precautions: Imbalance/Falls Risk    Access Code: [email or web address]       Insurance:  [place]/CMS Eval/ Re-eval Auth #/ Referral # Total units  Start date  Expiration date KX Visit limitation?  PT only or  PT+OT? Co-Insurance   CMS 24 NA    No Prior 47 visits in   100% after deduct    12/5/24            1/9/25            2/10/25            3/10/25            5/8/25                         POC Start Date POC Expiration Date Signed POC?   24 Yes   24 Yes   1/9/25 3/6/25 Pend   2/10/25 4/7/25 Pend   3/10/25 5/5/25 Pend      Date 11/4 12/5 12/10 12/13 12/18 12/19 12/26 12/30 1/2 1/9 1/16 1/23   Visits/Units:  Used 1 2 3 4-KX 5-KX 6-KX 7-KX 8-KX 9 10 11 12   Authed:  Remaining   RE        RE         Date 1/27 1/30 2/3 2/10 2/13 2/20 2/27 3/6 3/10 3/17 5/8 5/13   Visits/Units:  Used 13 14 15 16 17 18 19 20 21 22 23 24   Authed:  Remaining     RE     RE  RE        Date             Visits/Units:  Used             Authed:  Remaining                  Manuals     27 26 25 24 23    KX  KX KX KX KX                   Neuro Re-Ed        Turn in place 180 At railing and close supervision  10x B At railing and close supervision  10x B      Side step at rail On/off airex pad  3x5 B [id number] B [id number] B     Static stance on airex        SLS hip flex 20x B 1 HR  Skinny cone  20x B - 1 HR support Tap cone  20x B    STS 2x10  Chair + airex 10x/5x from chair + airex 5x need for UE support 5x    Ambulation then approach chair into stand to sit 5x continued need for cues to improve step length Training for improved quality of movements, reduced shuffling to improve safety.      TUG        Romberg EC    3x30s CS +  1/2 Rom EC  2x30s B    Ther Ex        Education     Updated POC   HEP        6 MWT    performed    5xSTS     performed   DGI     performed   Stand hip abd/ext  2x10 B ea      Seated march 30x 30x 30x 30x    Seated iso hip abd/add  Add 20x5s      Stand ham curl   20x B     B Heel raise    20x standing    LAQ

## 2025-06-03 ENCOUNTER — OFFICE VISIT (OUTPATIENT)
Dept: PHYSICAL THERAPY | Facility: CLINIC | Age: 75
End: 2025-06-03
Payer: MEDICARE

## 2025-06-03 DIAGNOSIS — R26.89 IMBALANCE: ICD-10-CM

## 2025-06-03 DIAGNOSIS — G62.9 NEUROPATHY: ICD-10-CM

## 2025-06-03 DIAGNOSIS — R26.9 GAIT DIFFICULTY: Primary | ICD-10-CM

## 2025-06-03 PROCEDURE — 97112 NEUROMUSCULAR REEDUCATION: CPT | Performed by: PHYSICAL THERAPIST

## 2025-06-03 PROCEDURE — 97110 THERAPEUTIC EXERCISES: CPT | Performed by: PHYSICAL THERAPIST

## 2025-06-03 NOTE — PROGRESS NOTES
Daily Note     Today's date: 6/3/2025  Patient name: Sahara Patel  : 1950  MRN: [id number]  Referring provider: Rubi Bill MD  Dx:   Encounter Diagnosis     ICD-10-CM    1. Gait difficulty  R26.9       2. Imbalance  R26.89       3. Neuropathy  G62.9                      Subjective: Sahara Patel reports her left knee had been bothering her more with going up and down stairs, walking and getting out of chair.  Her balance and endurance also continue      Objective: See treatment diary below      Assessment: Tolerated treatment well. Patient with reduced left knee pain following inferior patellar mobs.  She was instructed in self mobs to perform at home.  She cotinues with low endurance with need for frequent rest breaks and close supervision.       Plan: Continue per plan of care.      Precautions: Imbalance/Falls Risk    Access Code: [email or web address]       Insurance:  [place]/CMS Eval/ Re-eval Auth #/ Referral # Total units  Start date  Expiration date KX Visit limitation?  PT only or  PT+OT? Co-Insurance   CMS 24 NA    No Prior 47 visits in   100% after deduct    12/5/24            1/9/25            2/10/25            3/10/25            5/8/25                         POC Start Date POC Expiration Date Signed POC?   24 Yes   24 Yes   1/9/25 3/6/25 Pend   2/10/25 4/7/25 Pend   3/10/25 5/5/25 Pend      Date 11/4 12/5 12/10 12/13 12/18 12/19 12/26 12/30 1/2 1/9 1/16 1/23   Visits/Units:  Used 1 2 3 4-KX 5-KX 6-KX 7-KX 8-KX 9 10 11 12   Authed:  Remaining   RE        RE         Date 1/27 1/30 2/3 2/10 2/13 2/20 2/27 3/6 3/10 3/17 5/8 5/13   Visits/Units:  Used 13 14 15 16 17 18 19 20 21 22 23 24   Authed:  Remaining     RE     RE  RE        Date 5/19 5/23 5/29 6/3           Visits/Units:  Used            Authed:  Remaining                  Manuals 6/3 5/29 5/23 5/19 5/13    28 27 26 25 24    KX KX KX KX KX                   Neuro Re-Ed        Turn in place 180   At railing and close supervision  10x B At railing and close supervision  10x B     Side step at rail On airex beam  2x5 10ft B  On/off airex pad  3x5 B [id number] B 25x33nx B    SLS hip abd 15x B       SLS hip flex  20x B 1 HR  Skinny cone  20x B - 1 HR support Tap cone  20x B   STS 2x5 chair + airex need for cues to improve fwd wt shift 2x10  Chair + airex 10x/5x from chair + airex 5x need for UE support 5x   Ambulation then approach chair into stand to sit 5x continued need for cues to improve step length 5x continued need for cues to improve step length Training for improved quality of movements, reduced shuffling to improve safety.     TUG        Romberg EC     3x30s CS +  1/2 Rom EC  2x30s B   Ther Ex        Education        HEP        6 MWT     performed   5xSTS        DGI        Stand hip abd/ext   2x10 B ea     Seated march 30x  30x 30x 30x 30x   Seated iso hip abd/add   Add 20x5s     Stand ham curl    20x B    B Heel raise     20x standing   LAQ 15x B

## 2025-06-05 ENCOUNTER — OFFICE VISIT (OUTPATIENT)
Dept: PHYSICAL THERAPY | Facility: CLINIC | Age: 75
End: 2025-06-05
Payer: MEDICARE

## 2025-06-05 DIAGNOSIS — R26.89 IMBALANCE: ICD-10-CM

## 2025-06-05 DIAGNOSIS — G62.9 NEUROPATHY: ICD-10-CM

## 2025-06-05 DIAGNOSIS — R26.9 GAIT DIFFICULTY: Primary | ICD-10-CM

## 2025-06-05 PROCEDURE — 97110 THERAPEUTIC EXERCISES: CPT | Performed by: PHYSICAL THERAPIST

## 2025-06-05 PROCEDURE — 97112 NEUROMUSCULAR REEDUCATION: CPT | Performed by: PHYSICAL THERAPIST

## 2025-06-05 NOTE — PROGRESS NOTES
Daily Note     Today's date: 2025  Patient name: Sahara Patel  : 1950  MRN: [id number]  Referring provider: Rubi Bill MD  Dx: No diagnosis found.               Subjective: Sahara Patel reports she saw her neurologist who reviewed her brain image and thought she saw some abnormalities that may indicate signs of PD.  She is being prescribed to being a ramp up of medication over the next three weeks to determine if they help with her symptoms.      Objective: See treatment diary below      Assessment: Tolerated treatment well. Patient demonstrated fatigue post treatment, exhibited good technique with therapeutic exercises, and would benefit from continued PT.  Introduced partial components of LSVT daily exercises with fatigue and evidence of imbalance requiring close supervision.  She was also educated on potentially seeking Neuro OT evaluation to determine if tx would be beneficial for recent difficulties cognitively.        Plan: Continue per plan of care.      Precautions: Imbalance/Falls Risk    Access Code: [email or web address]       Insurance:  A/CMS Eval/ Re-eval Auth #/ Referral # Total units  Start date  Expiration date KX Visit limitation?  PT only or  PT+OT? Co-Insurance   CMS 24 NA    No Prior 47 visits in   100% after deduct    12/5/24            1/9/25            2/10/25            3/10/25            5/8/25                         POC Start Date POC Expiration Date Signed POC?   24 Yes   24 Yes   1/9/25 3/6/25 Pend   2/10/25 4/7/25 Pend   3/10/25 5/5/25 Pend      Date 11/4 12/5 12/10 12/13 12/18 12/19 12/26 12/30 1/2 1/9 1/16 1/23   Visits/Units:  Used 1 2 3 4-KX 5-KX 6-KX 7-KX 8-KX 9 10 11 12   Authed:  Remaining   RE        RE         Date 1/27 1/30 2/3 2/10 2/13 2/20 2/27 3/6 3/10 3/17 5/8 5/13   Visits/Units:  Used 13 14 15 16 17 18 19 20 21 22 23 24   Authed:  Remaining     RE     RE  RE        Date 5/19 5/23 5/29 6/3 6/5          Visits/Units:   Used 25 26 27 28 29          Authed:  Remaining      KX            Manuals 6/5 6/3 5/29 5/23 5/19    29 28 27 26 25    KX KX KX KX KX                   Neuro Re-Ed        LSVT Floot to ceiling 10x       LSVT step forward 10x B       LSVT sideways step 10x B       LSVT forward rock/reach 10x B       Turn in place 180   At railing and close supervision  10x B At railing and close supervision  10x B    Side step at rail  On airex beam  2x5 10ft B  On/off airex pad  3x5 B [id number] B [id number] B   SLS hip abd  15x B      SLS hip flex   20x B 1 HR  Skinny cone  20x B - 1 HR support   STS 5x 2x5 chair + airex need for cues to improve fwd wt shift 2x10  Chair + airex 10x/5x from chair + airex 5x need for UE support   Ambulation then approach chair into stand to sit  5x continued need for cues to improve step length 5x continued need for cues to improve step length Training for improved quality of movements, reduced shuffling to improve safety.    TUG        Romberg EC        Ther Ex        Education Reviewed Neuro OT, PT and Neuro Boxing as possible future options       HEP        6 MWT        5xSTS        DGI        Stand hip abd/ext    2x10 B ea    Inf patellar mobs PT       Seated march 30x 30x  30x 30x 30x   Seated iso hip abd/add    Add 20x5s    Stand ham curl     20x B   B Heel raise        LAQ  15x B

## 2025-06-10 ENCOUNTER — EVALUATION (OUTPATIENT)
Dept: PHYSICAL THERAPY | Facility: CLINIC | Age: 75
End: 2025-06-10
Payer: MEDICARE

## 2025-06-10 DIAGNOSIS — R26.9 GAIT DIFFICULTY: Primary | ICD-10-CM

## 2025-06-10 DIAGNOSIS — G62.9 NEUROPATHY: ICD-10-CM

## 2025-06-10 DIAGNOSIS — R26.89 IMBALANCE: ICD-10-CM

## 2025-06-10 PROCEDURE — 97112 NEUROMUSCULAR REEDUCATION: CPT | Performed by: PHYSICAL THERAPIST

## 2025-06-10 PROCEDURE — 97110 THERAPEUTIC EXERCISES: CPT | Performed by: PHYSICAL THERAPIST

## 2025-06-10 NOTE — LETTER
Karin 10, 2025    Rubi Bill MD  [address]  # [facility]  [place] [id number]    Patient: Sahara Patel   YOB: 1950   Date of Visit: 6/10/2025     Encounter Diagnosis     ICD-10-CM    1. Gait difficulty  R26.9       2. Imbalance  R26.89       3. Neuropathy  G62.9           Dear Dr. Rubi Bill MD:    Thank you for your recent referral of Sahara Patel. Please review the attached evaluation summary from Sahara's recent visit.     Please verify that you agree with the plan of care by signing the attached order.     If you have any questions or concerns, please do not hesitate to call.     I sincerely appreciate the opportunity to share in the care of one of your patients and hope to have another opportunity to work with you in the near future.       Sincerely,    Devon Robles, PT      Referring Provider:      I certify that I have read the below Plan of Care and certify the need for these services furnished under this plan of treatment while under my care.                    Rubi Bill MD  [address]  # [facility]  [facility] [address]  Via Mail          Re-Eval     Today's date: 6/10/2025  Patient name: Sahara Patel  : 1950  MRN: [id number]  Referring provider: Rubi Bill MD  Dx:   Encounter Diagnosis     ICD-10-CM    1. Gait difficulty  R26.9       2. Imbalance  R26.89       3. Neuropathy  G62.9                      Assessment  Current assessment details: Sahara Patel has been seen for 1 month since her return to OPPT following procedure to remove her parathyroid.  She has recently been diagnosed with possible parkinson's disease and started on gradual ramp up of medication this past weekend.  She continues with complaints of varying levels of energy, imbalance day to day.  She also reports increasing difficulty with cognitive tasks such as word recall in her various languages she speaks.  Today she demonstrated low endurance only being able to complete 2 minutes  of 6 minute walk test and 2 losses of balance requiring PT assistance to regain her balance.  She also continues with elevated 5x STS score, reliance on UE support and consistent posterior LOB during transfers.  She did however demonstrate a slight improvement in her DGI score today up to an 11/24 from an 8/24 last assessment.  This score however continues to demonstrate a very high risk of falls.  Her gait pattern continues to demonstrate signs of limited bilateral foot clearance that she can correct for short durations with verbal cues.  Her intermittent shuffling can be categorized as low level festinating gait pattern at times.  She will continue to benefit from skilled therapy to work towards addressing her strength, endurance and balance deficits as she begins transition onto new medication.  She also was recommended to seek a neuro OT evaluation regarding her recent cognitive symptoms to determine if she would be good candidate for treatment or given HEP.  PT will also start to incorporate LSVT exercises aimed for her new diagnosis of Parkinson's.        Impairments: abnormal gait, abnormal or restricted ROM, abnormal movement, activity intolerance, impaired balance, impaired physical strength, lacks appropriate home exercise program, poor posture  and poor body mechanics  Understanding of Dx/Px/POC: good   Prognosis: good    Goals - Updated 6/10/25  STG 2-4 weeks: -   1. Improve 5xSTS score to <30 seconds indicating improve ability to transfer STS. - Not Met  2. Pt will demonstrate increase DGI score by 1-2 points as evidence of decreased imbalance. - Met  3. Pt will be able to complete 6 minute walk test -  Not Met    LTG 4-12 weeks: - In Progress 6/10/25  1. Pt will improve 6 minute walk test to >1000ft   2. Pt will be I with comprehensive HEP.   3. Pt will demo >14/24 on dynamic gait index.      Plan  Plan details: HEP development, stretching, strengthening, A/AA/PROM, joint mobilizations, posture  "education, STM/MI as needed to reduce muscle tension, balance and proprioceptive training, muscle reeducation, PLOC discussed and agreed upon with patient.    Patient would benefit from: PT eval and skilled physical therapy  Planned modality interventions: cryotherapy and thermotherapy: hydrocollator packs  Planned therapy interventions: manual therapy, neuromuscular re-education, self care, therapeutic activities, therapeutic exercise and home exercise program  Frequency: 1-2x week  Duration in weeks: 6-12  Treatment plan discussed with: patient      Subjective Evaluation    History of Present Illness  Sahara Patel underwent procedure to remove half of thyroid and one parathyroid on 8/26/24.  During the procedure potential cancer was found in the thyroid and confirmed via biopsy.  She is to have further testing to determine if she will need to have parathyroid removed on opposite site and/or more of her thyroid.  She has also seen a neurologist specializing in movement and balance.  She did not see any evidence of PD, MS or any other conditions but contributes her increase in balance deficits due to her neuropathy, thryoid issues and diabetes.  She referred her back to OPPT at this time.    Since last being in therapy at this facility on 8/22/24.  Sahara Patel reports since then she feels significantly more fatigued, more imbalanced and having more difficulty getting around.  She states some days are better/worse than others.  She denies any falls in past month but has been more reliant on utilizing SPC for ambulation.  She has been avoiding walking outside on her own, relies on significant other to go with her for grocery shopping.  She also reports some days she is feeling \"foggy\" which is causing her to avoid driving.    Update 12/5/24:  Sahara Patel reports over the past month she has been experiencing significant fatigue that fluctuates day to day.  She states she is able to only do a couple things " before she needs to sit and rest way more frequently than in the past.  She feels her balance is even worse forcing her to rely on leaning on shopping cart while in grocery store.  She was seen by her thyroid specialist who is now referring her to another specialist for thoracic surgery to remove her remaining thyroid and parathyroid due to placement of thyroid and continues signs of cancer.      Update 1/9/25:  Sahara Patel states she feels her endurance is a little better, she feels a little stronger over the past month.  She has been able to do more cooking for her family and not fatiguing as much as she had in the past.  Today however she feels more unstable and fatigued which she notices in her walking.  She is scheduled for follow up with specialist for her parathyroid this upcoming week to determine POC of about potential surgery.    Update 2/10/25:  Sahara Patel returns to surgeon on 2/24/25 to schedule surgery to remove the remaining portion of her thyroid.  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  She is to follow up with neurologist on these findings.  Last week she had to make a cake for her grandson which took a lot of effort form her and she was extremely fatigued.  She is having to take frequent rest breaks with her normal activities due to fatigue.  She continues with fluctuations in her balance and endurance day to day.    Update 3/10/25:  Sahara Patel reports she has been utilizing her SPC while walking in her home as she has been feeling unsteady compared to previously using no AD.  She states today she woke up very tired and this continues to fluctuate day to day.  She also reports she was asked to watch her grandchildren but she doesn't feel she is strong enough, balanced enough to be able to care for them so deferred.      Update 5/8/25:  Sahara Patel underwent recent procedure on 3/25/25 where the removed the  parathyroid but left remaining half of her thyroid.  Following procedure she has had blood work showing her levels have improved to normal levels compared to prior to surgery.  She has been cleared to resume therapy.  She is also scheduled for an ultrsaound of her thryoid for her endocrinologist.  She recently followed up with neurologist who saw parkinsonian features of her gait and is now being sent for follow testing.  They also reviewed her prior brain scan showing possibilities of hydrocephalus.      She reports since her procedure she has felt her balance and overall mobility is either no better or worse since last being in OPPT.  She continues to feel unsteady with walking, utilizing her SPC outside of her home.  She reports she feels very unsteady when initially standing and feels it gets slightly better when she starts to move.  She has been doing some of her prior HEPs on her own and feels it helps.      Update 6/10/25:  Sahara Patel reports she started her new Parkinson's medication this past weekend and hasn't noticed any significant side effects or change in symptoms at this point.  She was told by her neurologist she saw signs of Parkinson's in her brain imaging and this new medication may be helpful but will take atleast 2 weeks to start seeing benefits.      She states today she feels more fatigued in general.  She continues to notice changes day to day in her endurance and balance.  She continues to have difficulty getting out of her chair without arm support as well as left knee pain.      Patient Goals  Patient goals for therapy: improved balance, increased strength and independence with ADLs/IADLs    Social Support  Steps to enter house: yes  4  Stairs in house: yes   4  Lives in: multiple-level home  Lives with: significant other    Employment status: not working  Exercise history: Pilates - 1-2x/wk (not currently)      Objective     Postural Observations  Seated posture: fair  Standing  posture: fair    Neurological Testing     Sensation     Lumbar   Left   Diminished: light touch    Right   Diminished: light touch    Additional Neurological Details  Evidence of 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     Strength/Myotome Testing     Left Hip   Planes of Motion   Flexion: 4+   External rotation: 4+  Internal rotation: 4+    Right Hip   Planes of Motion   Flexion: 4  External rotation: 4  Internal rotation: 4    Left Knee   Flexion: 5  Extension: 5    Right Knee   Flexion: 4+  Extension: 5    Left Ankle/Foot   Dorsiflexion: 5  Plantar flexion: 5    Right Ankle/Foot   Dorsiflexion: 5  Plantar flexion: 5    Ambulation     Ambulation: Stairs   Ascend stairs: independent  Pattern: reciprocal  Railings: one rail  Descend stairs: independent  Pattern: intermittently non-reciprocal - due to FOF  Railings: one rail      Observational Gait     Patient utilizing SPC for ambulation.  She demonstrates decreased stride length and walking speed, limited bilateral foot clearance with shuffling of her feet, left LE externally rotated.  She also demonstrated LOB with pivoting/turning, difficulty initiating movement and uncontrolled changes in her pace due to signs of forward LOB.  Evidence of overall increased risk of falls and need for close supervision.        DGI  MDC: vestibular - 4 pts  MDC: geriatric/community - 3 pts  Falls risk <19/24 5xSTS: Lucila et al 2010  MDC: 2.3 sec  Age Norms:  60-69: 11.1 sec  70-79: 12.6 sec  80-89: 14.8 sec   6 Minute Walk Test  Age Norms  60-69: M - 1876 ft (571.80 m)  F - 1765 ft (537.98 m)  70-79: M - 1729 ft (527.00 m)  F - 1545 ft (470.92 m)  80-89: M - 1368 ft (416.97 m)  F - 1286 ft (391.97 m) mCTSIB  Norm: 20-60 yrs  Eyes open firm: norm sway 0.21-0.48  Eyes closed firm: norm sway 0.48-0.99  Eyes open foam: norm sway 0.38-0.71  Eyes closed foam: norm sway 0.70-2.22       Outcome Measures 1/22/24   4/22/2024 11/4/24 1/9/25 2/10/25 3/10/25 5/8/25 6/10/25   5xSTS 22.06s Trial  1: 23.43s  Trial 2: 19.09s    UE assist 23.10 no UE assist - slight posterior LOB 48.34s initially no UE support but need for min assist by PT on last rep to prevent posterior LOB 23.00s with no UE support but 1 LOB posteriorly catching balance with UE on arm rest. 19.97s with no UE support but did lose balance posteriorly falling into chair on last repetition. 46.88s with increased need for UE support and posterior LOB 45.12s w/ need for UE support and repeated posterior LOB.   6MWT 1100ft 1300ft  440ft @3:10 600ft in 5 min prior to stopping test - need for close supervision throughout due to evidence of imbalance 950ft completed full 6 minutes while needing verbal cues to reduce shuffling and need for close supervision due to imbalance 515ft completed in 3:55 having to stop due to fatigue.  She requires continued CS throughout and cues to improve foot clearance. To be completed next sx Stopped at 2:00 due to fatigue - had to 2 LOB requiring min A by PT to prevent fall   2MWT 375ft 450ft  300ft 275ft 325ft 325ft To be completed next sx 350ft   DGI 19/24 21/24 9/24 9/24 11/24 13/24 8/24 11/24            Precautions: Imbalance/Falls Risk    Access Code: [email or web address]       Insurance:  AMA/CMS Eval/ Re-eval Auth #/ Referral # Total units  Start date  Expiration date KX Visit limitation?  PT only or  PT+OT? Co-Insurance   CMS 11/4/24 NA    No Prior 47 visits in 2024  100% after deduct    12/5/24            1/9/25            2/10/25            3/10/25            5/8/25            6/10/25             POC Start Date POC Expiration Date Signed POC?   11/4/24 12/30/24 Yes   12/5/24 1/30/25 Yes   1/9/25 3/6/25 Pend   2/10/25 4/7/25 Pend   3/10/25 5/5/25 Pend   6/10/25 7/22/25 Pend      Date 11/4 12/5 12/10 12/13 12/18 12/19 12/26 12/30 1/2 1/9 1/16 1/23   Visits/Units:  Used 1 2 3 4-KX 5-KX 6-KX 7-KX 8-KX 9 10 11 12   Authed:  Remaining   RE        RE         Date 1/27 1/30 2/3 2/10 2/13 2/20 2/27 3/6 3/10 3/17 5/8 5/13    Visits/Units:  Used 13 14 15 16 17 18 19 20 21 22 23 24   Authed:  Remaining     RE     RE  RE        Date 5/19 5/23 5/29 6/3 6/5 6/10         Visits/Units:  Used 25 26 27 28 29 30         Authed:  Remaining      KX RE           Manuals 6/10 6/5 6/3 5/29 5/23    30 29 28 27 26    KX KX KX KX KX                   Neuro Re-Ed        LSVT Floot to ceiling 10x10s 10x      LSVT step forward 10x B need for close supervision 10x B      LSVT sideways step  10x B      LSVT forward rock/reach  10x B      Turn in place 180    At railing and close supervision  10x B At railing and close supervision  10x B   Side step at rail   On airex beam  2x5 10ft B  On/off airex pad  3x5 B [id number] B   SLS hip abd   15x B     SLS hip flex    20x B 1 HR    STS  5x 2x5 chair + airex need for cues to improve fwd wt shift 2x10  Chair + airex 10x/5x from chair + airex   Ambulation then approach chair into stand to sit   5x continued need for cues to improve step length 5x continued need for cues to improve step length Training for improved quality of movements, reduced shuffling to improve safety.   TUG        Romberg EC        Ther Ex        Education Updated POC Reviewed Neuro OT, PT and Neuro Boxing as possible future options      HEP        6 MWT performed       5xSTS performed       DGI performed       Stand hip abd/ext     2x10 B ea   Inf patellar mobs  PT      Seated march 30x 30x 30x  30x 30x   Seated iso hip abd/add     Add 20x5s   Stand ham curl        B Heel raise        LAQ   15x B

## 2025-06-10 NOTE — PROGRESS NOTES
Re-Eval     Today's date: 6/10/2025  Patient name: Sahara Patel  : 1950  MRN: [id number]  Referring provider: Rubi Bill MD  Dx:   Encounter Diagnosis     ICD-10-CM    1. Gait difficulty  R26.9       2. Imbalance  R26.89       3. Neuropathy  G62.9                      Assessment  Current assessment details: Sahara Patel has been seen for 1 month since her return to OPPT following procedure to remove her parathyroid.  She has recently been diagnosed with possible parkinson's disease and started on gradual ramp up of medication this past weekend.  She continues with complaints of varying levels of energy, imbalance day to day.  She also reports increasing difficulty with cognitive tasks such as word recall in her various languages she speaks.  Today she demonstrated low endurance only being able to complete 2 minutes of 6 minute walk test and 2 losses of balance requiring PT assistance to regain her balance.  She also continues with elevated 5x STS score, reliance on UE support and consistent posterior LOB during transfers.  She did however demonstrate a slight improvement in her DGI score today up to an  from an  last assessment.  This score however continues to demonstrate a very high risk of falls.  Her gait pattern continues to demonstrate signs of limited bilateral foot clearance that she can correct for short durations with verbal cues.  Her intermittent shuffling can be categorized as low level festinating gait pattern at times.  She will continue to benefit from skilled therapy to work towards addressing her strength, endurance and balance deficits as she begins transition onto new medication.  She also was recommended to seek a neuro OT evaluation regarding her recent cognitive symptoms to determine if she would be good candidate for treatment or given HEP.  PT will also start to incorporate LSVT exercises aimed for her new diagnosis of Parkinson's.        Impairments:  abnormal gait, abnormal or restricted ROM, abnormal movement, activity intolerance, impaired balance, impaired physical strength, lacks appropriate home exercise program, poor posture  and poor body mechanics  Understanding of Dx/Px/POC: good   Prognosis: good    Goals - Updated 6/10/25  STG 2-4 weeks: -   1. Improve 5xSTS score to <30 seconds indicating improve ability to transfer STS. - Not Met  2. Pt will demonstrate increase DGI score by 1-2 points as evidence of decreased imbalance. - Met  3. Pt will be able to complete 6 minute walk test -  Not Met    LTG 4-12 weeks: - In Progress 6/10/25  1. Pt will improve 6 minute walk test to >1000ft   2. Pt will be I with comprehensive HEP.   3. Pt will demo >14/24 on dynamic gait index.      Plan  Plan details: HEP development, stretching, strengthening, A/AA/PROM, joint mobilizations, posture education, STM/MI as needed to reduce muscle tension, balance and proprioceptive training, muscle reeducation, PLOC discussed and agreed upon with patient.    Patient would benefit from: PT eval and skilled physical therapy  Planned modality interventions: cryotherapy and thermotherapy: hydrocollator packs  Planned therapy interventions: manual therapy, neuromuscular re-education, self care, therapeutic activities, therapeutic exercise and home exercise program  Frequency: 1-2x week  Duration in weeks: 6-12  Treatment plan discussed with: patient      Subjective Evaluation    History of Present Illness  Sahara Patel underwent procedure to remove half of thyroid and one parathyroid on 8/26/24.  During the procedure potential cancer was found in the thyroid and confirmed via biopsy.  She is to have further testing to determine if she will need to have parathyroid removed on opposite site and/or more of her thyroid.  She has also seen a neurologist specializing in movement and balance.  She did not see any evidence of PD, MS or any other conditions but contributes her increase in  "balance deficits due to her neuropathy, thryoid issues and diabetes.  She referred her back to OPPT at this time.    Since last being in therapy at this facility on 8/22/24.  Sahara Patel reports since then she feels significantly more fatigued, more imbalanced and having more difficulty getting around.  She states some days are better/worse than others.  She denies any falls in past month but has been more reliant on utilizing SPC for ambulation.  She has been avoiding walking outside on her own, relies on significant other to go with her for grocery shopping.  She also reports some days she is feeling \"foggy\" which is causing her to avoid driving.    Update 12/5/24:  Sahara Patel reports over the past month she has been experiencing significant fatigue that fluctuates day to day.  She states she is able to only do a couple things before she needs to sit and rest way more frequently than in the past.  She feels her balance is even worse forcing her to rely on leaning on shopping cart while in grocery store.  She was seen by her thyroid specialist who is now referring her to another specialist for thoracic surgery to remove her remaining thyroid and parathyroid due to placement of thyroid and continues signs of cancer.      Update 1/9/25:  Sahara Patel states she feels her endurance is a little better, she feels a little stronger over the past month.  She has been able to do more cooking for her family and not fatiguing as much as she had in the past.  Today however she feels more unstable and fatigued which she notices in her walking.  She is scheduled for follow up with specialist for her parathyroid this upcoming week to determine POC of about potential surgery.    Update 2/10/25:  Sahara Patel returns to surgeon on 2/24/25 to schedule surgery to remove the remaining portion of her thyroid.  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  She is " to follow up with neurologist on these findings.  Last week she had to make a cake for her grandson which took a lot of effort form her and she was extremely fatigued.  She is having to take frequent rest breaks with her normal activities due to fatigue.  She continues with fluctuations in her balance and endurance day to day.    Update 3/10/25:  Sahara Patel reports she has been utilizing her SPC while walking in her home as she has been feeling unsteady compared to previously using no AD.  She states today she woke up very tired and this continues to fluctuate day to day.  She also reports she was asked to watch her grandchildren but she doesn't feel she is strong enough, balanced enough to be able to care for them so deferred.      Update 5/8/25:  Sahara Patel underwent recent procedure on 3/25/25 where the removed the parathyroid but left remaining half of her thyroid.  Following procedure she has had blood work showing her levels have improved to normal levels compared to prior to surgery.  She has been cleared to resume therapy.  She is also scheduled for an ultrsaound of her thryoid for her endocrinologist.  She recently followed up with neurologist who saw parkinsonian features of her gait and is now being sent for follow testing.  They also reviewed her prior brain scan showing possibilities of hydrocephalus.      She reports since her procedure she has felt her balance and overall mobility is either no better or worse since last being in OPPT.  She continues to feel unsteady with walking, utilizing her SPC outside of her home.  She reports she feels very unsteady when initially standing and feels it gets slightly better when she starts to move.  She has been doing some of her prior HEPs on her own and feels it helps.      Update 6/10/25:  Sahara Patel reports she started her new Parkinson's medication this past weekend and hasn't noticed any significant side effects or change in symptoms at  this point.  She was told by her neurologist she saw signs of Parkinson's in her brain imaging and this new medication may be helpful but will take atleast 2 weeks to start seeing benefits.      She states today she feels more fatigued in general.  She continues to notice changes day to day in her endurance and balance.  She continues to have difficulty getting out of her chair without arm support as well as left knee pain.      Patient Goals  Patient goals for therapy: improved balance, increased strength and independence with ADLs/IADLs    Social Support  Steps to enter house: yes  4  Stairs in house: yes   4  Lives in: multiple-level home  Lives with: significant other    Employment status: not working  Exercise history: Pilates - 1-2x/wk (not currently)      Objective     Postural Observations  Seated posture: fair  Standing posture: fair    Neurological Testing     Sensation     Lumbar   Left   Diminished: light touch    Right   Diminished: light touch    Additional Neurological Details  Evidence of 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     Strength/Myotome Testing     Left Hip   Planes of Motion   Flexion: 4+   External rotation: 4+  Internal rotation: 4+    Right Hip   Planes of Motion   Flexion: 4  External rotation: 4  Internal rotation: 4    Left Knee   Flexion: 5  Extension: 5    Right Knee   Flexion: 4+  Extension: 5    Left Ankle/Foot   Dorsiflexion: 5  Plantar flexion: 5    Right Ankle/Foot   Dorsiflexion: 5  Plantar flexion: 5    Ambulation     Ambulation: Stairs   Ascend stairs: independent  Pattern: reciprocal  Railings: one rail  Descend stairs: independent  Pattern: intermittently non-reciprocal - due to FOF  Railings: one rail      Observational Gait     Patient utilizing SPC for ambulation.  She demonstrates decreased stride length and walking speed, limited bilateral foot clearance with shuffling of her feet, left LE externally rotated.  She also demonstrated LOB with pivoting/turning,  difficulty initiating movement and uncontrolled changes in her pace due to signs of forward LOB.  Evidence of overall increased risk of falls and need for close supervision.        DGI  MDC: vestibular - 4 pts  MDC: geriatric/community - 3 pts  Falls risk <19/24 5xSTS: Lucila et al 2010  MDC: 2.3 sec  Age Norms:  60-69: 11.1 sec  70-79: 12.6 sec  80-89: 14.8 sec   6 Minute Walk Test  Age Norms  60-69: M - 1876 ft (571.80 m)  F - 1765 ft (537.98 m)  70-79: M - 1729 ft (527.00 m)  F - 1545 ft (470.92 m)  80-89: M - 1368 ft (416.97 m)  F - 1286 ft (391.97 m) mCTSIB  Norm: 20-60 yrs  Eyes open firm: norm sway 0.21-0.48  Eyes closed firm: norm sway 0.48-0.99  Eyes open foam: norm sway 0.38-0.71  Eyes closed foam: norm sway 0.70-2.22       Outcome Measures 1/22/24   4/22/2024 11/4/24 1/9/25 2/10/25 3/10/25 5/8/25 6/10/25   5xSTS 22.06s Trial 1: 23.43s  Trial 2: 19.09s    UE assist 23.10 no UE assist - slight posterior LOB 48.34s initially no UE support but need for min assist by PT on last rep to prevent posterior LOB 23.00s with no UE support but 1 LOB posteriorly catching balance with UE on arm rest. 19.97s with no UE support but did lose balance posteriorly falling into chair on last repetition. 46.88s with increased need for UE support and posterior LOB 45.12s w/ need for UE support and repeated posterior LOB.   6MWT 1100ft 1300ft  440ft @3:10 600ft in 5 min prior to stopping test - need for close supervision throughout due to evidence of imbalance 950ft completed full 6 minutes while needing verbal cues to reduce shuffling and need for close supervision due to imbalance 515ft completed in 3:55 having to stop due to fatigue.  She requires continued CS throughout and cues to improve foot clearance. To be completed next sx Stopped at 2:00 due to fatigue - had to 2 LOB requiring min A by PT to prevent fall   2MWT 375ft 450ft  300ft 275ft 325ft 325ft To be completed next sx 350ft   DGI 19/24 21/24 9/24 9/24 11/24 13/24  8/24 11/24            Precautions: Imbalance/Falls Risk    Access Code: [email or web address]       Insurance:  AMA/CMS Eval/ Re-eval Auth #/ Referral # Total units  Start date  Expiration date KX Visit limitation?  PT only or  PT+OT? Co-Insurance   CMS 11/4/24 NA    No Prior 47 visits in 2024  100% after deduct    12/5/24            1/9/25            2/10/25            3/10/25            5/8/25            6/10/25             POC Start Date POC Expiration Date Signed POC?   11/4/24 12/30/24 Yes   12/5/24 1/30/25 Yes   1/9/25 3/6/25 Pend   2/10/25 4/7/25 Pend   3/10/25 5/5/25 Pend   6/10/25 7/22/25 Pend      Date 11/4 12/5 12/10 12/13 12/18 12/19 12/26 12/30 1/2 1/9 1/16 1/23   Visits/Units:  Used 1 2 3 4-KX 5-KX 6-KX 7-KX 8-KX 9 10 11 12   Authed:  Remaining   RE        RE         Date 1/27 1/30 2/3 2/10 2/13 2/20 2/27 3/6 3/10 3/17 5/8 5/13   Visits/Units:  Used 13 14 15 16 17 18 19 20 21 22 23 24   Authed:  Remaining     RE     RE  RE        Date 5/19 5/23 5/29 6/3 6/5 6/10         Visits/Units:  Used 25 26 27 28 29 30         Authed:  Remaining      KX RE           Manuals 6/10 6/5 6/3 5/29 5/23    30 29 28 27 26    KX KX KX KX KX                   Neuro Re-Ed        LSVT Floot to ceiling 10x10s 10x      LSVT step forward 10x B need for close supervision 10x B      LSVT sideways step  10x B      LSVT forward rock/reach  10x B      Turn in place 180    At railing and close supervision  10x B At railing and close supervision  10x B   Side step at rail   On airex beam  2x5 10ft B  On/off airex pad  3x5 B [id number]   SLS hip abd   15x B     SLS hip flex    20x B 1 HR    STS  5x 2x5 chair + airex need for cues to improve fwd wt shift 2x10  Chair + airex 10x/5x from chair + airex   Ambulation then approach chair into stand to sit   5x continued need for cues to improve step length 5x continued need for cues to improve step length Training for improved quality of movements, reduced shuffling to improve safety.   TUG        Romberg  EC        Ther Ex        Education Updated POC Reviewed Neuro OT, PT and Neuro Boxing as possible future options      HEP        6 MWT performed       5xSTS performed       DGI performed       Stand hip abd/ext     2x10 B ea   Inf patellar mobs  PT      Seated march 30x 30x 30x  30x 30x   Seated iso hip abd/add     Add 20x5s   Stand ham curl        B Heel raise        LAQ   15x B

## 2025-06-12 ENCOUNTER — OFFICE VISIT (OUTPATIENT)
Dept: PHYSICAL THERAPY | Facility: CLINIC | Age: 75
End: 2025-06-12
Payer: MEDICARE

## 2025-06-12 DIAGNOSIS — R26.9 GAIT DIFFICULTY: Primary | ICD-10-CM

## 2025-06-12 DIAGNOSIS — G62.9 NEUROPATHY: ICD-10-CM

## 2025-06-12 DIAGNOSIS — R26.89 IMBALANCE: ICD-10-CM

## 2025-06-12 PROCEDURE — 97112 NEUROMUSCULAR REEDUCATION: CPT | Performed by: PHYSICAL THERAPIST

## 2025-06-12 PROCEDURE — 97110 THERAPEUTIC EXERCISES: CPT | Performed by: PHYSICAL THERAPIST

## 2025-06-12 NOTE — PROGRESS NOTES
Daily Note     Today's date: 2025  Patient name: Sahara Patel  : 1950  MRN: [id number]  Referring provider: Rubi Bill MD  Dx:   Encounter Diagnosis     ICD-10-CM    1. Gait difficulty  R26.9       2. Imbalance  R26.89       3. Neuropathy  G62.9                      Subjective: Sahara Patel reports she feels tired today as she went to [facility] for first time in a while and walked a lot.  She also has been experiencing more pain along outside of left knee.      Objective: See treatment diary below      Assessment: Tolerated treatment well. Patient with signs of irritabilty along left distal ITB and responded well to IASTM and targeted stretching.  She demos continued difficulty with introduction of LSVT exercises leading to challenge of balance, endurance.  She did report onset of dizziness today during standing activity that required end of session to be completed in sitting.  Her BP was slightly elevated 142/72 and educated on her to monitor for rest of the day.      Plan: Continue per plan of care.      Precautions: Imbalance/Falls Risk    Access Code: [email or web address]       Insurance:  A/CMS Eval/ Re-eval Auth #/ Referral # Total units  Start date  Expiration date KX Visit limitation?  PT only or  PT+OT? Co-Insurance   CMS 24 NA    No Prior 47 visits in   100% after deduct    12/5/24            1/9/25            2/10/25            3/10/25            5/8/25            6/10/25             POC Start Date POC Expiration Date Signed POC?   24 Yes   24 Yes   1/9/25 3/6/25 Pend   2/10/25 4/7/25 Pend   3/10/25 5/5/25 Pend   6/10/25 7/22/25 Pend      Date 11/4 12/5 12/10 12/13 12/18 12/19 12/26 12/30 1/2 1/9 1/16 1/23   Visits/Units:  Used 1 2 3 4-KX 5-KX 6-KX 7-KX 8-KX 9 10 11 12   Authed:  Remaining   RE        RE         Date 1/27 1/30 2/3 2/10 2/13 2/20 2/27 3/6 3/10 3/17 5/8 5/13   Visits/Units:  Used  14 15 16 17 18 19 20 21 22 23 24   Authed:  Remaining      RE     RE  RE        Date 5/19 5/23 5/29 6/3 6/5 6/10 6/12        Visits/Units:  Used 25 26 27 28 29 30 31        Authed:  Remaining      KX RE KX          Manuals 6/12 6/10 6/5 6/3 5/29    31 30 29 28 27    KX KX KX KX KX   IASTM to left distal ITB PT               Neuro Re-Ed        LSVT Floot to ceiling  10x10s 10x     LSVT step forward 10x B close supervision/min A 10x B need for close supervision 10x B     LSVT sideways step 10x B CS/min as  10x B     LSVT forward rock/reach   10x B     LSVT back step 10x L  4xR prior to discontinue due to dizziness  CS/min A       Turn in place 180     At railing and close supervision  10x B   Side step at rail    On airex beam  2x5 10ft B  On/off airex pad  3x5 B   SLS hip abd    15x B    SLS hip flex     20x B 1 HR   STS   5x 2x5 chair + airex need for cues to improve fwd wt shift 2x10  Chair + airex   Ambulation then approach chair into stand to sit    5x continued need for cues to improve step length 5x continued need for cues to improve step length   TUG        Romberg EC        Ther Ex        Education  Updated POC Reviewed Neuro OT, PT and Neuro Boxing as possible future options     HEP        6 MWT  performed      5xSTS  performed      DGI  performed      Hook ITB str 3x30s       Inf patellar mobs   PT     Seated march 30x 30x 30x 30x  30x   Seated heel/toe raises 30x       Stand ham curl        B Heel raise        LAQ    15x B

## 2025-06-17 ENCOUNTER — OFFICE VISIT (OUTPATIENT)
Dept: PHYSICAL THERAPY | Facility: CLINIC | Age: 75
End: 2025-06-17
Payer: MEDICARE

## 2025-06-17 DIAGNOSIS — R26.89 IMBALANCE: ICD-10-CM

## 2025-06-17 DIAGNOSIS — R26.9 GAIT DIFFICULTY: Primary | ICD-10-CM

## 2025-06-17 DIAGNOSIS — G62.9 NEUROPATHY: ICD-10-CM

## 2025-06-17 PROCEDURE — 97112 NEUROMUSCULAR REEDUCATION: CPT | Performed by: PHYSICAL THERAPIST

## 2025-06-17 PROCEDURE — 97110 THERAPEUTIC EXERCISES: CPT | Performed by: PHYSICAL THERAPIST

## 2025-06-17 NOTE — PROGRESS NOTES
Daily Note     Today's date: 2025  Patient name: Sahara Patel  : 1950  MRN: [id number]  Referring provider: Rubi Bill MD  Dx:   Encounter Diagnosis     ICD-10-CM    1. Gait difficulty  R26.9       2. Imbalance  R26.89       3. Neuropathy  G62.9                      Subjective: Sahara Patel reports she has been feeling sleepy for a couple hours after taking new medication but feels better by afternoon.        Objective: See treatment diary below      Assessment: Tolerated treatment well. Patient demonstrated fatigue post treatment, exhibited good technique with therapeutic exercises, and would benefit from continued PT.  Her  attended session today to practice guarding her during her LSVT big exercises and proper technique to be able to increase her compliance with HEP.  She continues with low endurance requiring seated rest breaks, cues for upright posture and evidence of continued imbalance.        Plan: Continue per plan of care.      Precautions: Imbalance/Falls Risk    Access Code: [email or web address]       Insurance:  A/CMS Eval/ Re-eval Auth #/ Referral # Total units  Start date  Expiration date KX Visit limitation?  PT only or  PT+OT? Co-Insurance   CMS 24 NA    No Prior 47 visits in   100% after deduct    12/5/24            1/9/25            2/10/25            3/10/25            5/8/25            6/10/25             POC Start Date POC Expiration Date Signed POC?   24 Yes   24 Yes   1/9/25 3/6/25 Pend   2/10/25 4/7/25 Pend   3/10/25 5/5/25 Pend   6/10/25 7/22/25 Pend      Date 11/4 12/5 12/10 12/13 12/18 12/19 12/26 12/30 1/2 1/9 1/16 1/23   Visits/Units:  Used 1 2 3 4-KX 5-KX 6-KX 7-KX 8-KX 9 10 11 12   Authed:  Remaining   RE        RE         Date  2/3 2/10 2/13 2/20 2/27 3/6 3/10 3/17 5/8 5/13   Visits/Units:  Used 13 14 15 16 17 18 19 20 21 22 23 24   Authed:  Remaining     RE     RE  RE        Date 5/19 5/23 5/29 6/3 6/5 6/10 6/12  6/17       Visits/Units:  Used 25 26 27 28 29 30 31 32       Authed:  Remaining      KX RE KX kx         Manuals 6/17 6/12 6/10 6/5 6/3    32 31 30 29 28    KX KX KX KX KX   IASTM to left distal ITB  PT              Neuro Re-Ed        LSVT Floot to ceiling 10x10s  10x10s 10x    LSVT step forward 10x B close supervision/min  A 10x B close supervision/min A 10x B need for close supervision 10x B    LSVT sideways step 10x B CS/Min A 10x B CS/min as  10x B    LSVT forward rock/reach    10x B    LSVT back step 7x L  6x R close supervision/min A 10x L  4xR prior to discontinue due to dizziness  CS/min A      Turn in place 180        Side step at rail     On airex beam  2x5 10ft B    SLS hip abd     15x B   SLS hip flex        STS    5x 2x5 chair + airex need for cues to improve fwd wt shift   Ambulation then approach chair into stand to sit     5x continued need for cues to improve step length   TUG        Romberg EC        Ther Ex        Education Spouse education on proper guarding and techniwue of LSVT  Updated POC Reviewed Neuro OT, PT and Neuro Boxing as possible future options    HEP        6 MWT   performed     5xSTS   performed     DGI   performed     Hook ITB str  3x30s      Inf patellar mobs    PT    Seated march  30x 30x 30x 30x    Seated heel/toe raises  30x      Seated CS SNAGS rot Towel 15x B        B Heel raise        LAQ     15x B

## 2025-06-19 ENCOUNTER — OFFICE VISIT (OUTPATIENT)
Dept: PHYSICAL THERAPY | Facility: CLINIC | Age: 75
End: 2025-06-19
Payer: MEDICARE

## 2025-06-19 DIAGNOSIS — G62.9 NEUROPATHY: ICD-10-CM

## 2025-06-19 DIAGNOSIS — R26.9 GAIT DIFFICULTY: Primary | ICD-10-CM

## 2025-06-19 DIAGNOSIS — R26.89 IMBALANCE: ICD-10-CM

## 2025-06-19 PROCEDURE — 97112 NEUROMUSCULAR REEDUCATION: CPT | Performed by: PHYSICAL THERAPIST

## 2025-06-19 PROCEDURE — 97110 THERAPEUTIC EXERCISES: CPT | Performed by: PHYSICAL THERAPIST

## 2025-06-19 NOTE — PROGRESS NOTES
Daily Note     Today's date: 2025  Patient name: Sahara Patel  : 1950  MRN: [id number]  Referring provider: Rubi Bill MD  Dx:   Encounter Diagnosis     ICD-10-CM    1. Gait difficulty  R26.9       2. Imbalance  R26.89       3. Neuropathy  G62.9                      Subjective: Sahara aPtel reports she feels dizzy today but has been noticing this after taking her new PD medication and then tends to feel better later in day.      Objective: See treatment diary below  BP:131/55  HR 76 bpm  2nd assessment:   125/62 + 78 bpm    Assessment: Tolerated treatment well. Patient with increased dizziness during standing exercises today which required increased seated rest breaks and seated exercises today.  She required increased guarding and performed smaller steps during standing balance today.        Plan: Continue per plan of care.      Precautions: Imbalance/Falls Risk    Access Code: [email or web address]       Insurance:  [place]/CMS Eval/ Re-eval Auth #/ Referral # Total units  Start date  Expiration date KX Visit limitation?  PT only or  PT+OT? Co-Insurance   CMS 24 NA    No Prior 47 visits in   100% after deduct    12/5/24            1/9/25            2/10/25            3/10/25            5/8/25            6/10/25             POC Start Date POC Expiration Date Signed POC?   24 Yes   24 Yes   1/9/25 3/6/25 Pend   2/10/25 4/7/25 Pend   3/10/25 5/5/25 Pend   6/10/25 7/22/25 Pend      Date 11/4 12/5 12/10 12/13 12/18 12/19 12/26 12/30 1/2 1/9 1/16 1/23   Visits/Units:  Used 1 2 3 4-KX 5-KX 6-KX 7-KX 8-KX 9 10 11 12   Authed:  Remaining   RE        RE         Date  2/3 2/10 2/13 2/20 2/27 3/6 3/10 3/17 5/8 5/13   Visits/Units:  Used 13 14 15 16 17 18 19 20 21  24   Authed:  Remaining     RE     RE  RE        Date 5/19 5/23 5/29 6/3 6/5 6/10 6/12 6/17 6/19      Visits/Units:  Used 25 26 27 28 29 30 31 32 33      Authed:  Remaining      KX RE KX kx KX         Manuals 6/19 6/17 6/12 6/10 6/5    33 32 31 30 29    KX KX KX KX KX   IASTM to left distal ITB   PT             Neuro Re-Ed        LSVT Floot to ceiling 10x10s 10x10s  10x10s 10x   LSVT step forward 9x L  6xR CS with seated rest break between due to fatigue/dizziness 10x B close supervision/min  A 10x B close supervision/min A 10x B need for close supervision 10x B   LSVT sideways step 3x R only then DC due to dizziness 10x B CS/Min A 10x B CS/min as  10x B   LSVT forward rock/reach     10x B   LSVT back step 1x attempted but stopped due to dizziness 7x L  6x R close supervision/min A 10x L  4xR prior to discontinue due to dizziness  CS/min A     Turn in place 180        Side step at rail        SLS hip abd        SLS hip flex        STS     5x   Ambulation then approach chair into stand to sit        TUG        Romberg EC        Ther Ex        Education  Spouse education on proper guarding and techniwue of LSVT  Updated POC Reviewed Neuro OT, PT and Neuro Boxing as possible future options   HEP        6 MWT    performed    5xSTS    performed    DGI    performed    Hook ITB str   3x30s     Inf patellar mobs     PT   Seated march   30x 30x 30x   Seated heel/toe raises 20x ea  30x     Seated CS SNAGS rot  Towel 15x B       B Heel raise        LAQ 2x10 B - left knee soreness

## 2025-06-24 ENCOUNTER — APPOINTMENT (OUTPATIENT)
Dept: PHYSICAL THERAPY | Facility: CLINIC | Age: 75
End: 2025-06-24
Payer: MEDICARE

## 2025-06-26 ENCOUNTER — OFFICE VISIT (OUTPATIENT)
Dept: PHYSICAL THERAPY | Facility: CLINIC | Age: 75
End: 2025-06-26
Payer: MEDICARE

## 2025-06-26 DIAGNOSIS — R26.89 IMBALANCE: ICD-10-CM

## 2025-06-26 DIAGNOSIS — G62.9 NEUROPATHY: ICD-10-CM

## 2025-06-26 DIAGNOSIS — R26.9 GAIT DIFFICULTY: Primary | ICD-10-CM

## 2025-06-26 PROCEDURE — 97112 NEUROMUSCULAR REEDUCATION: CPT

## 2025-06-26 NOTE — PROGRESS NOTES
Daily Note     Today's date: 2025  Patient name: Sahara Patel  : 1950  MRN: [id number]  Referring provider: Rubi Bill MD  Dx:   Encounter Diagnosis     ICD-10-CM    1. Gait difficulty  R26.9       2. Imbalance  R26.89       3. Neuropathy  G62.9           Start Time: 1100  Stop Time: 1145  Total time in clinic (min): 45 minutes    Subjective: Pt reports she has been feeling a bit more fatigued recently and feels it may be due to her medication.       Objective: See treatment diary below      Assessment: Tolerated treatment well. Pt was able to complete all LSVT exercises today with close supervision/contact guard assist for safety and frequent rest breaks due to fatigue. She demonstrated increased difficulty performing exercises on her L side with reduced step length and intermittent evidence of unsteadiness. Patient demonstrated fatigue post treatment and would benefit from continued PT      Plan: Continue per plan of care.  Progress treatment as tolerated.       Precautions: Imbalance/Falls Risk    Access Code: [email or web address]       Insurance:  A/CMS Eval/ Re-eval Auth #/ Referral # Total units  Start date  Expiration date KX Visit limitation?  PT only or  PT+OT? Co-Insurance   CMS 24 NA    No Prior 47 visits in   100% after deduct    12/5/24            1/9/25            2/10/25            3/10/25            5/8/25            6/10/25             POC Start Date POC Expiration Date Signed POC?   24 Yes   24 Yes   1/9/25 3/6/25 Pend   2/10/25 4/7/25 Pend   3/10/25 5/5/25 Pend   6/10/25 7/22/25 Pend      Date 11/4 12/5 12/10 12/13 12/18 12/19 12/26 12/30 1/2 1/9 1/16 1/23   Visits/Units:  Used 1 2 3 4-KX 5-KX 6-KX 7-KX 8-KX 9 10 11 12   Authed:  Remaining   RE        RE         Date 1/27 1/30 2/3 2/10 2/13 2/20 2/27 3/6 3/10 3/17 5/8 5/13   Visits/Units:  Used 13 14 15 16 17 18 19 20 21 22 23 24   Authed:  Remaining     RE     RE  RE        Date   6/3 6/5 6/10 6/12 6/17 6/19 6/26     Visits/Units:  Used 25 26 27 28 29 30 31 32 33 34     Authed:  Remaining      KX RE KX kx KX KX       Manuals 6/26 6/19 6/17 6/12 6/10 6/5    34 33 32 31 30 29    KX KX KX KX KX KX   IASTM to left distal ITB    PT              Neuro Re-Ed         LSVT Floor to ceiling 10x10s  10x10s 10x10s  10x10s 10x   LSVT step forward 10x ea CS with rest breaks  9x L  6xR CS with seated rest break between due to fatigue/dizziness 10x B close supervision/min  A 10x B close supervision/min A 10x B need for close supervision 10x B   LSVT sideways step 10x ea CS  3x R only then DC due to dizziness 10x B CS/Min A 10x B CS/min as  10x B   LSVT forward rock/reach 10x B CS/CG     10x B   LSVT back step 10x ea CS/CG 1x attempted but stopped due to dizziness 7x L  6x R close supervision/min A 10x L  4xR prior to discontinue due to dizziness  CS/min A     Turn in place 180         Side step at rail         SLS hip abd         SLS hip flex         STS 8x      5x   Ambulation then approach chair into stand to sit         TUG         Romberg EC         Ther Ex         Education   Spouse education on proper guarding and techniwue of LSVT  Updated POC Reviewed Neuro OT, PT and Neuro Boxing as possible future options   HEP         6 MWT     performed    5xSTS     performed    DGI     performed    Hook ITB str    3x30s     Inf patellar mobs      PT   Seated march    30x 30x 30x   Seated heel/toe raises 20x ea  20x ea  30x     Seated CS SNAGS rot   Towel 15x B       B Heel raise         LAQ  2x10 B - left knee soreness

## 2025-07-01 ENCOUNTER — OFFICE VISIT (OUTPATIENT)
Dept: PHYSICAL THERAPY | Facility: CLINIC | Age: 75
End: 2025-07-01
Payer: MEDICARE

## 2025-07-01 DIAGNOSIS — G62.9 NEUROPATHY: ICD-10-CM

## 2025-07-01 DIAGNOSIS — R26.89 IMBALANCE: ICD-10-CM

## 2025-07-01 DIAGNOSIS — R26.9 GAIT DIFFICULTY: Primary | ICD-10-CM

## 2025-07-01 PROCEDURE — 97112 NEUROMUSCULAR REEDUCATION: CPT

## 2025-07-01 NOTE — PROGRESS NOTES
Daily Note     Today's date: 2025  Patient name: Sahara Patel  : 1950  MRN: [id number]  Referring provider: Rubi Bill MD  Dx:   Encounter Diagnosis     ICD-10-CM    1. Gait difficulty  R26.9       2. Imbalance  R26.89       3. Neuropathy  G62.9             Start Time: 1445  Stop Time: 1530  Total time in clinic (min): 45 minutes    Subjective: Pt has been stressed secondary [place] [facility] [place]. Pt reports she has had a full day traveling to [place]. Pt reports she was able to rest in-between appointments, so she is feeling ok currently.       Objective: See treatment diary below      Assessment: Pt able to complete seated LSVT independent without cues, however standing requires CS to CG with frequent rests between sets. Frontal plane exercises demonstrate more challenge than sagittal.       Plan: Continue per plan of care.  Progress treatment as tolerated.       Precautions: Imbalance/Falls Risk    Access Code: [email or web address]       Insurance:  A/CMS Eval/ Re-eval Auth #/ Referral # Total units  Start date  Expiration date KX Visit limitation?  PT only or  PT+OT? Co-Insurance   CMS 24 NA    No Prior 47 visits in   100% after deduct    12/5/24            1/9/25            2/10/25            3/10/25            5/8/25            6/10/25             POC Start Date POC Expiration Date Signed POC?   24 Yes   24 Yes   1/9/25 3/6/25 Pend   2/10/25 4/7/25 Pend   3/10/25 5/5/25 Pend   6/10/25 7/22/25 Pend      Date 11/4 12/5 12/10 12/13 12/18 12/19 12/26 12/30 1/2 1/9 1/16 1/23   Visits/Units:  Used 1 2 3 4-KX 5-KX 6-KX 7-KX 8-KX 9 10 11 12   Authed:  Remaining   RE        RE         Date 1/27 1/30 2/3 2/10 2/13 2/20 2/27 3/6 3/10 3/17 5/8 5/13   Visits/Units:  Used 13 14 15 16 17 18 19 20 21 22 23 24   Authed:  Remaining     RE     RE  RE        Date 5/19 5/23 5/29 6/3 6/5 6/10 6/12 6/17 6/19 6/26 7/1    Visits/Units:  Used   28 29 30 31 32 33 34 35    Authed:  Remaining      KX RE KX kx KX KX KX      Manuals 7/1 6/26 6/19 6/17 6/12 6/10 6/5    35 34 33 32 31 30 29    KX KX KX KX KX KX KX   IASTM to left distal ITB     PT               Neuro Re-Ed          LSVT Floor to ceiling 10x10s 10x10s  10x10s 10x10s  10x10s 10x   LSVT step forward 2x5 ea CS  10x ea CS with rest breaks  9x L  6xR CS with seated rest break between due to fatigue/dizziness 10x B close supervision/min  A 10x B close supervision/min A 10x B need for close supervision 10x B   LSVT sideways step 2x5 ea CS 10x ea CS  3x R only then DC due to dizziness 10x B CS/Min A 10x B CS/min as  10x B   LSVT forward rock/reach 10x B CS/CG 10x B CS/CG     10x B   LSVT back step 10x ea CS/CG 10x ea CS/CG 1x attempted but stopped due to dizziness 7x L  6x R close supervision/min A 10x L  4xR prior to discontinue due to dizziness  CS/min A     Turn in place 180          Side step at rail          SLS hip abd          SLS hip flex          STS  8x      5x   Ambulation then approach chair into stand to sit          TUG          Romberg EC          Ther Ex          Education    Spouse education on proper guarding and techniwue of LSVT  Updated POC Reviewed Neuro OT, PT and Neuro Boxing as possible future options   HEP          6 MWT      performed    5xSTS      performed    DGI      performed    Hook ITB str     3x30s     Inf patellar mobs       PT   Seated march     30x 30x 30x   Seated heel/toe raises  20x ea  20x ea  30x     Seated CS SNAGS rot    Towel 15x B       B Heel raise          LAQ   2x10 B - left knee soreness

## 2025-07-03 ENCOUNTER — OFFICE VISIT (OUTPATIENT)
Dept: PHYSICAL THERAPY | Facility: CLINIC | Age: 75
End: 2025-07-03
Payer: MEDICARE

## 2025-07-03 DIAGNOSIS — G62.9 NEUROPATHY: ICD-10-CM

## 2025-07-03 DIAGNOSIS — R26.9 GAIT DIFFICULTY: Primary | ICD-10-CM

## 2025-07-03 DIAGNOSIS — R26.89 IMBALANCE: ICD-10-CM

## 2025-07-03 PROCEDURE — 97112 NEUROMUSCULAR REEDUCATION: CPT | Performed by: PHYSICAL THERAPIST

## 2025-07-03 PROCEDURE — 97110 THERAPEUTIC EXERCISES: CPT | Performed by: PHYSICAL THERAPIST

## 2025-07-03 NOTE — PROGRESS NOTES
Daily Note     Today's date: 7/3/2025  Patient name: Sahara Patel  : 1950  MRN: [id number]  Referring provider: Rubi Bill MD  Dx:   Encounter Diagnosis     ICD-10-CM    1. Gait difficulty  R26.9       2. Imbalance  R26.89       3. Neuropathy  G62.9                      Subjective: Sahara Patel reports she feels more dizzy and unsteady today.  Overall since starting her medication she hasn't noticed any improvement and is worried it wont help.  She continues to have some days that are worse than others.  She also reports her left knee has been bothering her more the last couple days making it more difficult to walk and bend knee.      Objective: See treatment diary below    107/60 bp  80 hr    Assessment: Tolerated treatment fair. Patient with increased dizziness and lightheadedness during standing activity today.  Her BP was low which may be contributing factor.  She presented ambulating with increased evidence of imbalance, signs of festinating gait pattern.  Her left knee pain also lead to decreased tolerance to standing activity.  Educated patient to begin daily BP checks and document current symptoms to help bring more information back to neurologist regarding changes in symptoms with new medication.       Plan: Continue per plan of care.  Focus on improving standing balance, endurance and reduce progression of impaired gait related to PD diagnosis.     Precautions: Imbalance/Falls Risk    Access Code: [email or web address]       Insurance:  A/CMS Eval/ Re-eval Auth #/ Referral # Total units  Start date  Expiration date KX Visit limitation?  PT only or  PT+OT? Co-Insurance   CMS 24 NA    No Prior 47 visits in   100% after deduct    12/5/24            1/9/25            2/10/25            3/10/25            5/8/25            6/10/25             POC Start Date POC Expiration Date Signed POC?   24 Yes   24 Yes   1/9/25 3/6/25 Pend   2/10/25 4/7/25 Pend   3/10/25  5/5/25 Pend   6/10/25 7/22/25 Pend      Date 11/4 12/5 12/10 12/13 12/18 12/19 12/26 12/30 1/2 1/9 1/16 1/23   Visits/Units:  Used 1 2 3 4-KX 5-KX 6-KX 7-KX 8-KX 9 10 11 12   Authed:  Remaining   RE        RE         Date 1/27 1/30 2/3 2/10 2/13 2/20 2/27 3/6 3/10 3/17 5/8 5/13   Visits/Units:  Used 13 14 15 16 17 18 19 20 21 22 23 24   Authed:  Remaining     RE     RE  RE        Date 5/19 5/23 5/29 6/3 6/5 6/10 6/12 6/17 6/19 6/26 7/1 7/3   Visits/Units:  Used 25 26 27 28 29 30 31 32 33 34 35 36   Authed:  Remaining      KX RE KX kx KX KX KX      Manuals 7/3 7/1 6/26 6/19 6/17    36 35 34 33 32    KX KX KX KX KX   IASTM to left distal ITB                Neuro Re-Ed        LSVT Floor to ceiling 2x5 5s need for rest break 10x10s 10x10s  10x10s 10x10s   LSVT step forward 10x B CS 2x5 ea CS  10x ea CS with rest breaks  9x L  6xR CS with seated rest break between due to fatigue/dizziness 10x B close supervision/min  A   LSVT sideways step 3x to left then DC due to dizziness 2x5 ea CS 10x ea CS  3x R only then DC due to dizziness 10x B CS/Min A   LSVT forward rock/reach  10x B CS/CG 10x B CS/CG     LSVT back step  10x ea CS/CG 10x ea CS/CG 1x attempted but stopped due to dizziness 7x L  6x R close supervision/min A   Turn in place 180        Side step at rail        SLS hip abd        SLS hip flex        STS   8x      Ambulation then approach chair into stand to sit        TUG        Romberg EC        Ther Ex        Education     Spouse education on proper guarding and techniwue of LSVT   HEP        6 MWT        5xSTS        DGI        Hook ITB str        Seated heel slides 20x       Seated march 20x       Seated heel/toe raises   20x ea  20x ea    Seated CS SNAGS rot     Towel 15x B    B Heel raise        LAQ 2x10 B   2x10 B - left knee soreness

## 2025-07-08 ENCOUNTER — EVALUATION (OUTPATIENT)
Dept: PHYSICAL THERAPY | Facility: CLINIC | Age: 75
End: 2025-07-08
Payer: MEDICARE

## 2025-07-08 DIAGNOSIS — G62.9 NEUROPATHY: ICD-10-CM

## 2025-07-08 DIAGNOSIS — R26.9 GAIT DIFFICULTY: Primary | ICD-10-CM

## 2025-07-08 DIAGNOSIS — R26.89 IMBALANCE: ICD-10-CM

## 2025-07-08 PROCEDURE — 97110 THERAPEUTIC EXERCISES: CPT | Performed by: PHYSICAL THERAPIST

## 2025-07-08 PROCEDURE — 97112 NEUROMUSCULAR REEDUCATION: CPT | Performed by: PHYSICAL THERAPIST

## 2025-07-08 NOTE — PROGRESS NOTES
Re-Eval     Today's date: 2025  Patient name: Sahara Patel  : 1950  MRN: [id number]  Referring provider: Rubi Bill MD  Dx:   Encounter Diagnosis     ICD-10-CM    1. Gait difficulty  R26.9       2. Imbalance  R26.89       3. Neuropathy  G62.9                      Assessment  Current assessment details: Sahara Patel presents for re-evaluation today following her recent start of medication for parkinson's disease.  She reports she has not noticed any significant change in her overall mobility with complaints of continued imbalance, low endurance and need to rely on her  for balance at times when ambulating in community.  Her gait patterns can fluctuate session to session with some days with less evidence of imbalance but more frequently coming to therapy with signs of progressing festinating gait pattern, difficulty initiating movements, turning in place and higher levels of imbalance.  Her dynamic gait index score decreased by 2 points indicating higher risk of falls.  She continues with posterior LOB during STS and 5xSTS time higher than normal age related values.  PT over past month has included initiation of LSVT exercises to focus on balance/strength/endurance in structured program.  She has been instructed in home exercises as well as included her  to help guard her during these due to imbalance.  Her symptoms and presentation continue to present consistent with parkinson's disease that is severely impacting her functional mobility.  She is scheduled to follow up with neurologist next week to review response to new medication and also discuss further diagnostic testing to rule out hydrocephalus component to symptoms.  She will continue to benefit from skilled therapy to work towards addressing her strength, endurance and balance deficits as she begins transition onto new medication.  She also was recommended to seek a neuro OT evaluation regarding her recent cognitive  symptoms to determine if she would be good candidate for treatment or given HEP.        Impairments: abnormal gait, abnormal or restricted ROM, abnormal movement, activity intolerance, impaired balance, impaired physical strength, lacks appropriate home exercise program, poor posture  and poor body mechanics  Understanding of Dx/Px/POC: good   Prognosis: good    Goals - Updated 7/8/25  STG 2-4 weeks: -   1. Improve 5xSTS score to <30 seconds indicating improve ability to transfer STS. - Not Met  2. Pt will demonstrate increase DGI score by 1-2 points as evidence of decreased imbalance. - Regressed  3. Pt will be able to complete 6 minute walk test -  Not Met    LTG 4-12 weeks: - In Progress 7/8/25  1. Pt will improve 6 minute walk test to >1000ft   2. Pt will be I with comprehensive HEP.   3. Pt will demo >14/24 on dynamic gait index.      Plan  Plan details: HEP development, stretching, strengthening, A/AA/PROM, joint mobilizations, posture education, STM/MI as needed to reduce muscle tension, balance and proprioceptive training, muscle reeducation, PLOC discussed and agreed upon with patient.    Patient would benefit from: PT eval and skilled physical therapy  Planned modality interventions: cryotherapy and thermotherapy: hydrocollator packs  Planned therapy interventions: manual therapy, neuromuscular re-education, self care, therapeutic activities, therapeutic exercise and home exercise program  Frequency: 1-2x week  Duration in weeks: 6-12  Treatment plan discussed with: patient      Subjective Evaluation    History of Present Illness  Sahara Patel underwent procedure to remove half of thyroid and one parathyroid on 8/26/24.  During the procedure potential cancer was found in the thyroid and confirmed via biopsy.  She is to have further testing to determine if she will need to have parathyroid removed on opposite site and/or more of her thyroid.  She has also seen a neurologist specializing in movement and  "balance.  She did not see any evidence of PD, MS or any other conditions but contributes her increase in balance deficits due to her neuropathy, thryoid issues and diabetes.  She referred her back to OPPT at this time.    Since last being in therapy at this facility on 8/22/24.  Sahara Patel reports since then she feels significantly more fatigued, more imbalanced and having more difficulty getting around.  She states some days are better/worse than others.  She denies any falls in past month but has been more reliant on utilizing SPC for ambulation.  She has been avoiding walking outside on her own, relies on significant other to go with her for grocery shopping.  She also reports some days she is feeling \"foggy\" which is causing her to avoid driving.    Update 12/5/24:  Sahara Patel reports over the past month she has been experiencing significant fatigue that fluctuates day to day.  She states she is able to only do a couple things before she needs to sit and rest way more frequently than in the past.  She feels her balance is even worse forcing her to rely on leaning on shopping cart while in grocery store.  She was seen by her thyroid specialist who is now referring her to another specialist for thoracic surgery to remove her remaining thyroid and parathyroid due to placement of thyroid and continues signs of cancer.      Update 1/9/25:  Sahara Patel states she feels her endurance is a little better, she feels a little stronger over the past month.  She has been able to do more cooking for her family and not fatiguing as much as she had in the past.  Today however she feels more unstable and fatigued which she notices in her walking.  She is scheduled for follow up with specialist for her parathyroid this upcoming week to determine POC of about potential surgery.    Update 2/10/25:  Sahara Patel returns to surgeon on 2/24/25 to schedule surgery to remove the remaining portion of her thyroid.  " Her MRI of her brain found signs of pituitary findgins and possible normal pressure hydrocephalus.  She is to follow up with neurologist on these findings.  Last week she had to make a cake for her grandson which took a lot of effort form her and she was extremely fatigued.  She is having to take frequent rest breaks with her normal activities due to fatigue.  She continues with fluctuations in her balance and endurance day to day.    Update 3/10/25:  Sahara Patel reports she has been utilizing her SPC while walking in her home as she has been feeling unsteady compared to previously using no AD.  She states today she woke up very tired and this continues to fluctuate day to day.  She also reports she was asked to watch her grandchildren but she doesn't feel she is strong enough, balanced enough to be able to care for them so deferred.      Update 5/8/25:  Sahara Patel underwent recent procedure on 3/25/25 where the removed the parathyroid but left remaining half of her thyroid.  Following procedure she has had blood work showing her levels have improved to normal levels compared to prior to surgery.  She has been cleared to resume therapy.  She is also scheduled for an ultrsaound of her thryoid for her endocrinologist.  She recently followed up with neurologist who saw parkinsonian features of her gait and is now being sent for follow testing.  They also reviewed her prior brain scan showing possibilities of hydrocephalus.      She reports since her procedure she has felt her balance and overall mobility is either no better or worse since last being in OPPT.  She continues to feel unsteady with walking, utilizing her SPC outside of her home.  She reports she feels very unsteady when initially standing and feels it gets slightly better when she starts to move.  She has been doing some of her prior HEPs on her own and feels it helps.      Update 6/10/25:  Sahara Patel reports she started her new  Parkinson's medication this past weekend and hasn't noticed any significant side effects or change in symptoms at this point.  She was told by her neurologist she saw signs of Parkinson's in her brain imaging and this new medication may be helpful but will take atleast 2 weeks to start seeing benefits.      She states today she feels more fatigued in general.  She continues to notice changes day to day in her endurance and balance.  She continues to have difficulty getting out of her chair without arm support as well as left knee pain.      Update 7/8/25:  Sahara Patel reports over the past month she has not noticed any significant change in her overall mobility since starting her new PD medication.  She thinks she may have noticed very subtle improvements in her word recall and memory.  She states her walking and balance overall continues to fluctuate day to day but causing her to be fearful of falling. She states her  has to remind her frequently to stop shuffling her gait.  She is tired easily with standing activity.      Patient Goals  Patient goals for therapy: improved balance, increased strength and independence with ADLs/IADLs    Social Support  Steps to enter house: yes  4  Stairs in house: yes   4  Lives in: multiple-level home  Lives with: significant other    Employment status: not working  Exercise history: Pilates - 1-2x/wk (not currently)      Objective     Postural Observations  Seated posture: fair  Standing posture: fair    Neurological Testing     Sensation     Lumbar   Left   Diminished: light touch    Right   Diminished: light touch    Additional Neurological Details  Evidence of 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     Strength/Myotome Testing     Left Hip   Planes of Motion   Flexion: 4+   External rotation: 4+  Internal rotation: 4+    Right Hip   Planes of Motion   Flexion: 4  External rotation: 4  Internal rotation: 4    Left Knee   Flexion: 5  Extension: 5    Right Knee    Flexion: 4+  Extension: 5    Left Ankle/Foot   Dorsiflexion: 5  Plantar flexion: 5    Right Ankle/Foot   Dorsiflexion: 5  Plantar flexion: 5    Ambulation     Ambulation: Stairs   Ascend stairs: independent  Pattern: reciprocal  Railings: one rail  Descend stairs: independent  Pattern: intermittently non-reciprocal - due to FOF  Railings: one rail      Observational Gait     Patient utilizing SPC for ambulation.  She demonstrates decreased stride length and walking speed, limited bilateral foot clearance with shuffling of her feet, left LE externally rotated.  She also demonstrated LOB with pivoting/turning, difficulty initiating movement and uncontrolled changes in her pace due to signs of forward LOB.  Evidence of overall increased risk of falls and need for close supervision.        DGI  MDC: vestibular - 4 pts  MDC: geriatric/community - 3 pts  Falls risk <19/24 5xSTS: Lucila et al 2010  MDC: 2.3 sec  Age Norms:  60-69: 11.1 sec  70-79: 12.6 sec  80-89: 14.8 sec   6 Minute Walk Test  Age Norms  60-69: M - 1876 ft (571.80 m)  F - 1765 ft (537.98 m)  70-79: M - 1729 ft (527.00 m)  F - 1545 ft (470.92 m)  80-89: M - 1368 ft (416.97 m)  F - 1286 ft (391.97 m) mCTSIB  Norm: 20-60 yrs  Eyes open firm: norm sway 0.21-0.48  Eyes closed firm: norm sway 0.48-0.99  Eyes open foam: norm sway 0.38-0.71  Eyes closed foam: norm sway 0.70-2.22       Outcome Measures 1/22/24   4/22/2024 1/9/25 2/10/25 3/10/25 5/8/25 6/10/25 7/8/25   5xSTS 22.06s Trial 1: 23.43s  Trial 2: 19.09s    UE assist 48.34s initially no UE support but need for min assist by PT on last rep to prevent posterior LOB 23.00s with no UE support but 1 LOB posteriorly catching balance with UE on arm rest. 19.97s with no UE support but did lose balance posteriorly falling into chair on last repetition. 46.88s with increased need for UE support and posterior LOB 45.12s w/ need for UE support and repeated posterior LOB. 45.25s w/ need for UE support and repeated  posterior LOB.   6MWT 1100ft 1300ft  600ft in 5 min prior to stopping test - need for close supervision throughout due to evidence of imbalance 950ft completed full 6 minutes while needing verbal cues to reduce shuffling and need for close supervision due to imbalance 515ft completed in 3:55 having to stop due to fatigue.  She requires continued CS throughout and cues to improve foot clearance. To be completed next sx Stopped at 2:00 due to fatigue - had to 2 LOB requiring min A by PT to prevent fall Stopped at 3:15 due to fatigue.  Required close sup + min A due to significant imbalance  450ft   2MWT 375ft 450ft  275ft 325ft 325ft To be completed next sx 350ft 225ft   DGI 19/24 21/24 9/24 11/24 13/24 8/24 11/24 [id number]          Precautions: Imbalance/Falls Risk    Access Code: [email or web address]       Insurance:  [employer]/CMS Eval/ Re-eval Auth #/ Referral # Total units  Start date  Expiration date KX Visit limitation?  PT only or  PT+OT? Co-Insurance   CMS 11/4/24 NA    No Prior 47 visits in 2024  100% after deduct    12/5/24            1/9/25            2/10/25            3/10/25            5/8/25            6/10/25            7/8/25             POC Start Date POC Expiration Date Signed POC?   11/4/24 12/30/24 Yes   12/5/24 1/30/25 Yes   1/9/25 3/6/25 Pend   2/10/25 4/7/25 Pend   3/10/25 5/5/25 Pend   6/10/25 7/22/25 Pend   7/8/25 8/19/25 Pend      Date 11/4 12/5 12/10 12/13 12/18 12/19 12/26 12/30 1/2 1/9 1/16 1/23   Visits/Units:  Used 1 2 3 4-KX 5-KX 6-KX 7-KX 8-KX 9 10 11 12   [employer]hed:  Remaining   RE        RE         Date 1/27 1/30 2/3 2/10 2/13 2/20 2/27 3/6 3/10 3/17 5/8 5/13   Visits/Units:  Used 13 14 15 16 17 18 19 20 21 22 23 24   [employer]hed:  Remaining     RE     RE  RE        Date 5/19 5/23 5/29 6/3 6/5 6/10 6/12 6/17 6/19 6/26 7/1 7/3   Visits/Units:  Used 25 26 27 28 29 30 31 32 33 34 35 36   [employer]hed:  Remaining      KX RE KX kx KX KX KX       Date 7/8              Visits/Units:  Used 37-RE              Authed:   Remaining  KX                  Manuals 7/8 7/3 7/1 6/26 6/19    37 36 35 34 33    KX KX KX KX KX   IASTM to left distal ITB                Neuro Re-Ed        LSVT Floor to ceiling  2x5 5s need for rest break 10x10s 10x10s  10x10s   LSVT step forward 5x B CS 10x B CS 2x5 ea CS  10x ea CS with rest breaks  9x L  6xR CS with seated rest break between due to fatigue/dizziness   LSVT sideways step  3x to left then DC due to dizziness 2x5 ea CS 10x ea CS  3x R only then DC due to dizziness   LSVT forward rock/reach   10x B CS/CG 10x B CS/CG    LSVT back step   10x ea CS/CG 10x ea CS/CG 1x attempted but stopped due to dizziness   Turn in place 180        Side step at rail        SLS hip abd        SLS hip flex        STS    8x     Ambulation then approach chair into stand to sit        TUG        Romberg EC        Ther Ex        Education        HEP        6 MWT performed       5xSTS performed       DGI performed       Hook ITB str        Seated heel slides  20x      Seated march 20x 20x      Seated heel/toe raises    20x ea  20x ea   Seated CS SNAGS rot        B Heel raise        LAQ  2x10 B   2x10 B - left knee soreness

## 2025-07-08 NOTE — LETTER
2025    Rubi Bill MD  [address] # [facility]  [place] [address]    Patient: Sahara Patel   YOB: 1950   Date of Visit: 2025     Encounter Diagnosis     ICD-10-CM    1. Gait difficulty  R26.9       2. Imbalance  R26.89       3. Neuropathy  G62.9           Dear Dr. Rubi Bill MD:    Thank you for your recent referral of Sahara Patel. Please review the attached evaluation summary from Sahara's recent visit.     Please verify that you agree with the plan of care by signing the attached order.     If you have any questions or concerns, please do not hesitate to call.     I sincerely appreciate the opportunity to share in the care of one of your patients and hope to have another opportunity to work with you in the near future.       Sincerely,    Devon Robles, PT      Referring Provider:      I certify that I have read the below Plan of Care and certify the need for these services furnished under this plan of treatment while under my care.                    Rubi Bill MD  [address] # [facility]  [place] [address]  Via Fax: [phone]          Re-Eval     Today's date: 2025  Patient name: Sahara Patel  : 1950  MRN: [id number]  Referring provider: Rubi Bill MD  Dx:   Encounter Diagnosis     ICD-10-CM    1. Gait difficulty  R26.9       2. Imbalance  R26.89       3. Neuropathy  G62.9                      Assessment  Current assessment details: Sahara Patel presents for re-evaluation today following her recent start of medication for parkinson's disease.  She reports she has not noticed any significant change in her overall mobility with complaints of continued imbalance, low endurance and need to rely on her  for balance at times when ambulating in community.  Her gait patterns can fluctuate session to session with some days with less evidence of imbalance but more frequently coming to therapy with signs of progressing festinating  gait pattern, difficulty initiating movements, turning in place and higher levels of imbalance.  Her dynamic gait index score decreased by 2 points indicating higher risk of falls.  She continues with posterior LOB during STS and 5xSTS time higher than normal age related values.  PT over past month has included initiation of LSVT exercises to focus on balance/strength/endurance in structured program.  She has been instructed in home exercises as well as included her  to help guard her during these due to imbalance.  Her symptoms and presentation continue to present consistent with parkinson's disease that is severely impacting her functional mobility.  She is scheduled to follow up with neurologist next week to review response to new medication and also discuss further diagnostic testing to rule out hydrocephalus component to symptoms.  She will continue to benefit from skilled therapy to work towards addressing her strength, endurance and balance deficits as she begins transition onto new medication.  She also was recommended to seek a neuro OT evaluation regarding her recent cognitive symptoms to determine if she would be good candidate for treatment or given HEP.        Impairments: abnormal gait, abnormal or restricted ROM, abnormal movement, activity intolerance, impaired balance, impaired physical strength, lacks appropriate home exercise program, poor posture  and poor body mechanics  Understanding of Dx/Px/POC: good   Prognosis: good    Goals - Updated 7/8/25  STG 2-4 weeks: -   1. Improve 5xSTS score to <30 seconds indicating improve ability to transfer STS. - Not Met  2. Pt will demonstrate increase DGI score by 1-2 points as evidence of decreased imbalance. - Regressed  3. Pt will be able to complete 6 minute walk test -  Not Met    LTG 4-12 weeks: - In Progress 7/8/25  1. Pt will improve 6 minute walk test to >1000ft   2. Pt will be I with comprehensive HEP.   3. Pt will demo >14/24 on dynamic  "gait index.      Plan  Plan details: HEP development, stretching, strengthening, A/AA/PROM, joint mobilizations, posture education, STM/MI as needed to reduce muscle tension, balance and proprioceptive training, muscle reeducation, PLOC discussed and agreed upon with patient.    Patient would benefit from: PT eval and skilled physical therapy  Planned modality interventions: cryotherapy and thermotherapy: hydrocollator packs  Planned therapy interventions: manual therapy, neuromuscular re-education, self care, therapeutic activities, therapeutic exercise and home exercise program  Frequency: 1-2x week  Duration in weeks: 6-12  Treatment plan discussed with: patient      Subjective Evaluation    History of Present Illness  Sahara Patel underwent procedure to remove half of thyroid and one parathyroid on 8/26/24.  During the procedure potential cancer was found in the thyroid and confirmed via biopsy.  She is to have further testing to determine if she will need to have parathyroid removed on opposite site and/or more of her thyroid.  She has also seen a neurologist specializing in movement and balance.  She did not see any evidence of PD, MS or any other conditions but contributes her increase in balance deficits due to her neuropathy, thryoid issues and diabetes.  She referred her back to OPPT at this time.    Since last being in therapy at this facility on 8/22/24.  Sahara Patel reports since then she feels significantly more fatigued, more imbalanced and having more difficulty getting around.  She states some days are better/worse than others.  She denies any falls in past month but has been more reliant on utilizing SPC for ambulation.  She has been avoiding walking outside on her own, relies on significant other to go with her for grocery shopping.  She also reports some days she is feeling \"foggy\" which is causing her to avoid driving.    Update 12/5/24:  Sahara Patel reports over the past month she " has been experiencing significant fatigue that fluctuates day to day.  She states she is able to only do a couple things before she needs to sit and rest way more frequently than in the past.  She feels her balance is even worse forcing her to rely on leaning on shopping cart while in grocery store.  She was seen by her thyroid specialist who is now referring her to another specialist for thoracic surgery to remove her remaining thyroid and parathyroid due to placement of thyroid and continues signs of cancer.      Update 1/9/25:  Sahara Patel states she feels her endurance is a little better, she feels a little stronger over the past month.  She has been able to do more cooking for her family and not fatiguing as much as she had in the past.  Today however she feels more unstable and fatigued which she notices in her walking.  She is scheduled for follow up with specialist for her parathyroid this upcoming week to determine POC of about potential surgery.    Update 2/10/25:  Sahara Patel returns to surgeon on 2/24/25 to schedule surgery to remove the remaining portion of her thyroid.  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  She is to follow up with neurologist on these findings.  Last week she had to make a cake for her grandson which took a lot of effort form her and she was extremely fatigued.  She is having to take frequent rest breaks with her normal activities due to fatigue.  She continues with fluctuations in her balance and endurance day to day.    Update 3/10/25:  Sahara Patel reports she has been utilizing her SPC while walking in her home as she has been feeling unsteady compared to previously using no AD.  She states today she woke up very tired and this continues to fluctuate day to day.  She also reports she was asked to watch her grandchildren but she doesn't feel she is strong enough, balanced enough to be able to care for them  so deferred.      Update 5/8/25:  Sahara Patel underwent recent procedure on 3/25/25 where the removed the parathyroid but left remaining half of her thyroid.  Following procedure she has had blood work showing her levels have improved to normal levels compared to prior to surgery.  She has been cleared to resume therapy.  She is also scheduled for an ultrsaound of her thryoid for her endocrinologist.  She recently followed up with neurologist who saw parkinsonian features of her gait and is now being sent for follow testing.  They also reviewed her prior brain scan showing possibilities of hydrocephalus.      She reports since her procedure she has felt her balance and overall mobility is either no better or worse since last being in OPPT.  She continues to feel unsteady with walking, utilizing her SPC outside of her home.  She reports she feels very unsteady when initially standing and feels it gets slightly better when she starts to move.  She has been doing some of her prior HEPs on her own and feels it helps.      Update 6/10/25:  Sahara Patel reports she started her new Parkinson's medication this past weekend and hasn't noticed any significant side effects or change in symptoms at this point.  She was told by her neurologist she saw signs of Parkinson's in her brain imaging and this new medication may be helpful but will take atleast 2 weeks to start seeing benefits.      She states today she feels more fatigued in general.  She continues to notice changes day to day in her endurance and balance.  She continues to have difficulty getting out of her chair without arm support as well as left knee pain.      Update 7/8/25:  Sahara Patel reports over the past month she has not noticed any significant change in her overall mobility since starting her new PD medication.  She thinks she may have noticed very subtle improvements in her word recall and memory.  She states her walking and balance overall  continues to fluctuate day to day but causing her to be fearful of falling. She states her  has to remind her frequently to stop shuffling her gait.  She is tired easily with standing activity.      Patient Goals  Patient goals for therapy: improved balance, increased strength and independence with ADLs/IADLs    Social Support  Steps to enter house: yes  4  Stairs in house: yes   4  Lives in: multiple-level home  Lives with: significant other    Employment status: not working  Exercise history: Pilates - 1-2x/wk (not currently)      Objective     Postural Observations  Seated posture: fair  Standing posture: fair    Neurological Testing     Sensation     Lumbar   Left   Diminished: light touch    Right   Diminished: light touch    Additional Neurological Details  Evidence of 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     Strength/Myotome Testing     Left Hip   Planes of Motion   Flexion: 4+   External rotation: 4+  Internal rotation: 4+    Right Hip   Planes of Motion   Flexion: 4  External rotation: 4  Internal rotation: 4    Left Knee   Flexion: 5  Extension: 5    Right Knee   Flexion: 4+  Extension: 5    Left Ankle/Foot   Dorsiflexion: 5  Plantar flexion: 5    Right Ankle/Foot   Dorsiflexion: 5  Plantar flexion: 5    Ambulation     Ambulation: Stairs   Ascend stairs: independent  Pattern: reciprocal  Railings: one rail  Descend stairs: independent  Pattern: intermittently non-reciprocal - due to FOF  Railings: one rail      Observational Gait     Patient utilizing SPC for ambulation.  She demonstrates decreased stride length and walking speed, limited bilateral foot clearance with shuffling of her feet, left LE externally rotated.  She also demonstrated LOB with pivoting/turning, difficulty initiating movement and uncontrolled changes in her pace due to signs of forward LOB.  Evidence of overall increased risk of falls and need for close supervision.        DGI  MDC: vestibular - 4 pts  MDC:  geriatric/community - 3 pts  Falls risk <19/24 5xSTS: Lucila et al 2010  MDC: 2.3 sec  Age Norms:  60-69: 11.1 sec  70-79: 12.6 sec  80-89: 14.8 sec   6 Minute Walk Test  Age Norms  60-69: M - 1876 ft (571.80 m)  F - 1765 ft (537.98 m)  70-79: M - 1729 ft (527.00 m)  F - 1545 ft (470.92 m)  80-89: M - 1368 ft (416.97 m)  F - 1286 ft (391.97 m) mCTSIB  Norm: 20-60 yrs  Eyes open firm: norm sway 0.21-0.48  Eyes closed firm: norm sway 0.48-0.99  Eyes open foam: norm sway 0.38-0.71  Eyes closed foam: norm sway 0.70-2.22       Outcome Measures 1/22/24   4/22/2024 1/9/25 2/10/25 3/10/25 5/8/25 6/10/25 7/8/25   5xSTS 22.06s Trial 1: 23.43s  Trial 2: 19.09s    UE assist 48.34s initially no UE support but need for min assist by PT on last rep to prevent posterior LOB 23.00s with no UE support but 1 LOB posteriorly catching balance with UE on arm rest. 19.97s with no UE support but did lose balance posteriorly falling into chair on last repetition. 46.88s with increased need for UE support and posterior LOB 45.12s w/ need for UE support and repeated posterior LOB. 45.25s w/ need for UE support and repeated posterior LOB.   6MWT 1100ft 1300ft  600ft in 5 min prior to stopping test - need for close supervision throughout due to evidence of imbalance 950ft completed full 6 minutes while needing verbal cues to reduce shuffling and need for close supervision due to imbalance 515ft completed in 3:55 having to stop due to fatigue.  She requires continued CS throughout and cues to improve foot clearance. To be completed next sx Stopped at 2:00 due to fatigue - had to 2 LOB requiring min A by PT to prevent fall Stopped at 3:15 due to fatigue.  Required close sup + min A due to significant imbalance  450ft   2MWT 375ft 450ft  275ft 325ft 325ft To be completed next sx 350ft 225ft   DGI 19/24 21/24 9/24 11/24 13/24 8/24 11/24 [id number]/24200          Precautions: Imbalance/Falls Risk    Access Code: [email or web address]       Insurance:  AMA/CMS Eval/  Re-eval Auth #/ Referral # Total units  Start date  Expiration date KX Visit limitation?  PT only or  PT+OT? Co-Insurance   CMS 11/4/24 NA    No Prior 47 visits in 2024  100% after deduct    12/5/24            1/9/25            2/10/25            3/10/25            5/8/25            6/10/25            7/8/25             POC Start Date POC Expiration Date Signed POC?   11/4/24 12/30/24 Yes   12/5/24 1/30/25 Yes   1/9/25 3/6/25 Pend   2/10/25 4/7/25 Pend   3/10/25 5/5/25 Pend   6/10/25 7/22/25 Pend   7/8/25 8/19/25 Pend      Date 11/4 12/5 12/10 12/13 12/18 12/19 12/26 12/30 1/2 1/9 1/16 1/23   Visits/Units:  Used 1 2 3 4-KX 5-KX 6-KX 7-KX 8-KX 9 10 11 12   Authed:  Remaining   RE        RE         Date 1/27 1/30 2/3 2/10 2/13 2/20 2/27 3/6 3/10 3/17 5/8 5/13   Visits/Units:  Used 13 14 15 16 17 18 19 20 21 22 23 24   Authed:  Remaining     RE     RE  RE        Date 5/19 5/23 5/29 6/3 6/5 6/10 6/12 6/17 6/19 6/26 7/1 7/3   Visits/Units:  Used 25 26 27 28 29 30 31 32 33 34 35 36   Authed:  Remaining      KX RE KX kx KX KX KX       Date 7/8              Visits/Units:  Used 37-RE              Authed:  Remaining  KX                  Manuals 7/8 7/3 7/1 6/26 6/19    37 36 35 34 33    KX KX KX KX KX   IASTM to left distal ITB                Neuro Re-Ed        LSVT Floor to ceiling  2x5 5s need for rest break 10x10s 10x10s  10x10s   LSVT step forward 5x B CS 10x B CS 2x5 ea CS  10x ea CS with rest breaks  9x L  6xR CS with seated rest break between due to fatigue/dizziness   LSVT sideways step  3x to left then DC due to dizziness 2x5 ea CS 10x ea CS  3x R only then DC due to dizziness   LSVT forward rock/reach   10x B CS/CG 10x B CS/CG    LSVT back step   10x ea CS/CG 10x ea CS/CG 1x attempted but stopped due to dizziness   Turn in place 180        Side step at rail        SLS hip abd        SLS hip flex        STS    8x     Ambulation then approach chair into stand to sit        TUG        Romberg EC        Ther Ex         Education        HEP        6 MWT performed       5xSTS performed       DGI performed       Hook ITB str        Seated heel slides  20x      Seated march 20x 20x      Seated heel/toe raises    20x ea  20x ea   Seated CS SNAGS rot        B Heel raise        LAQ  2x10 B   2x10 B - left knee soreness

## 2025-07-10 ENCOUNTER — OFFICE VISIT (OUTPATIENT)
Dept: PHYSICAL THERAPY | Facility: CLINIC | Age: 75
End: 2025-07-10
Payer: MEDICARE

## 2025-07-10 DIAGNOSIS — R26.9 GAIT DIFFICULTY: Primary | ICD-10-CM

## 2025-07-10 DIAGNOSIS — G62.9 NEUROPATHY: ICD-10-CM

## 2025-07-10 DIAGNOSIS — R26.89 IMBALANCE: ICD-10-CM

## 2025-07-10 PROCEDURE — 97112 NEUROMUSCULAR REEDUCATION: CPT | Performed by: PHYSICAL THERAPIST

## 2025-07-10 NOTE — PROGRESS NOTES
Daily Note     Today's date: 7/10/2025  Patient name: Sahara Patel  : 1950  MRN: [id number]  Referring provider: Rubi Bill MD  Dx:   Encounter Diagnosis     ICD-10-CM    1. Gait difficulty  R26.9       2. Imbalance  R26.89       3. Neuropathy  G62.9                      Subjective: Sahara Patel reports  was a bad day with feeling more imbalance and fatigued very easily.  Today she feels she woke up much better and more balanced.        Objective: See treatment diary below      Assessment: Tolerated treatment well. Patient with significant improved presentation today vs Tuesday with less evidence of festinating gait pattern, more control with STS, increased standing endurance and ability to complete her LSVT exercises with less imbalance and need for assistance by PT.        Plan: Continue per plan of care.      Precautions: Imbalance/Falls Risk    Access Code: [email or web address]       Insurance:  A/CMS Eval/ Re-eval Auth #/ Referral # Total units  Start date  Expiration date KX Visit limitation?  PT only or  PT+OT? Co-Insurance   CMS 24 NA    No Prior 47 visits in   100% after deduct    12/5/24            1/9/25            2/10/25            3/10/25            5/8/25            6/10/25            7/8/25             POC Start Date POC Expiration Date Signed POC?   24 Yes   24 Yes   1/9/25 3/6/25 Pend   2/10/25 4/7/25 Pend   3/10/25 5/5/25 Pend   6/10/25 7/22/25 Pend   25 Pend      Date 11/4 12/5 12/10 12/13 12/18 12/19 12/26 12/30 1/2 1/9 1/16 1/23   Visits/Units:  Used 1 2 3 4-KX 5-KX 6-KX 7-KX 8-KX 9 10 11 12   Authed:  Remaining   RE        RE         Date  2/3 2/10 2/13 2/20 2/27 3/6 3/10 3/17 5/8 5/13   Visits/Units:  Used 13 14 15 16 17 18 19 20 21 22 23 24   Authed:  Remaining     RE     RE  RE        Date 5/19 5/23 5/29 6/3 6/5 6/10 6/12 6/17 6/19 6/26 7/1 7/3   Visits/Units:  Used 25 26 27 28 29 30 31 32 33 34 35 36   Authed:   Remaining      KX RE KX kx KX KX KX       Date 7/8 7/10             Visits/Units:  Used 37-RE 38             Authed:  Remaining  KX KX                 Manuals 7/10 7/8 7/3 7/1 6/26    38 37 36 35 34    KX KX KX KX KX   IASTM to left distal ITB                Neuro Re-Ed        LSVT Floor to ceiling 10x10s  2x5 5s need for rest break 10x10s 10x10s    LSVT step forward 10x B CS 5x B CS 10x B CS 2x5 ea CS  10x ea CS with rest breaks    LSVT sideways step 10x B CS  3x to left then DC due to dizziness 2x5 ea CS 10x ea CS    LSVT forward rock/reach 10x B CS/CG   10x B CS/CG 10x B CS/CG   LSVT back step 5x B CS/CG and slight dizziness   10x ea CS/CG 10x ea CS/CG   Turn in place 180        Side step at rail        SLS hip abd        SLS hip flex        STS     8x    Ambulation then approach chair into stand to sit        TUG        Romberg EC        Ther Ex        Education        HEP        6 MWT  performed      5xSTS  performed      DGI  performed      Hook ITB str        Seated heel slides   20x     Seated march  20x 20x     Seated heel/toe raises     20x ea    Seated CS SNAGS rot        B Heel raise        LAQ   2x10 B

## 2025-07-15 ENCOUNTER — OFFICE VISIT (OUTPATIENT)
Dept: PHYSICAL THERAPY | Facility: CLINIC | Age: 75
End: 2025-07-15
Payer: MEDICARE

## 2025-07-15 DIAGNOSIS — G62.9 NEUROPATHY: ICD-10-CM

## 2025-07-15 DIAGNOSIS — R26.89 IMBALANCE: ICD-10-CM

## 2025-07-15 DIAGNOSIS — R26.9 GAIT DIFFICULTY: Primary | ICD-10-CM

## 2025-07-15 PROCEDURE — 97112 NEUROMUSCULAR REEDUCATION: CPT | Performed by: PHYSICAL THERAPIST

## 2025-07-17 ENCOUNTER — OFFICE VISIT (OUTPATIENT)
Dept: PHYSICAL THERAPY | Facility: CLINIC | Age: 75
End: 2025-07-17
Payer: MEDICARE

## 2025-07-17 DIAGNOSIS — R26.9 GAIT DIFFICULTY: Primary | ICD-10-CM

## 2025-07-17 DIAGNOSIS — R26.89 IMBALANCE: ICD-10-CM

## 2025-07-17 DIAGNOSIS — G62.9 NEUROPATHY: ICD-10-CM

## 2025-07-17 PROCEDURE — 97110 THERAPEUTIC EXERCISES: CPT | Performed by: PHYSICAL THERAPIST

## 2025-07-17 PROCEDURE — 97112 NEUROMUSCULAR REEDUCATION: CPT | Performed by: PHYSICAL THERAPIST

## 2025-07-17 NOTE — PROGRESS NOTES
Daily Note     Today's date: 2025  Patient name: Sahara Patel  : 1950  MRN: [id number]  Referring provider: Rubi Bill MD  Dx:   Encounter Diagnosis     ICD-10-CM    1. Gait difficulty  R26.9       2. Imbalance  R26.89       3. Neuropathy  G62.9                      Subjective: Sahara Patel saw her neurologist today who advised her to stop new PD medication due to no change in symptoms and this helps to rule out PD.  She is now being sent for further testing for hydrocephalus to determine if potential shunt may be needed.  Today she is very tired due to traveling to neurologist right before her PT appt.      Objective: See treatment diary below      Assessment: Tolerated treatment fair. Patient with slight imbalance and low level dizzines with CS flexion in standing.  She also continues with evidence of increased festinating pattern and increased imbalance with negotiation around cones/small spaces and turning.  She required verbal cues and close supervision during all standing activity due to imbalance.       Plan: Continue per plan of care.      Precautions: Imbalance/Falls Risk    Access Code: [email or web address]       Insurance:  AMA/CMS Eval/ Re-eval Auth #/ Referral # Total units  Start date  Expiration date KX Visit limitation?  PT only or  PT+OT? Co-Insurance   CMS 24 NA    No Prior 47 visits in   100% after deduct    12/5/24            1/9/25            2/10/25            3/10/25            5/8/25            6/10/25            7/8/25             POC Start Date POC Expiration Date Signed POC?   24 Yes   24 Yes   1/9/25 3/6/25 Pend   2/10/25 4/7/25 Pend   3/10/25 5/5/25 Pend   6/10/25 7/22/25 Pend   25 Pend      Date 11/4 12/5 12/10 12/13 12/18 12/19 12/26 12/30 1/2 1/9 1/16 1/23   Visits/Units:  Used 1 2 3 4-KX 5-KX 6-KX 7-KX 8-KX 9 10 11 12   Authed:  Remaining   RE        RE         Date 1/27 1/30 2/3 2/10 2/13 2/20 2/27 3/6 3/10 3/17 5/8  5/13   Visits/Units:  Used 13 14 15 16 17 18 19 20 21 22 23 24   Authed:  Remaining     RE     RE  RE        Date 5/19 5/23 5/29 6/3 6/5 6/10 6/12 6/17 6/19 6/26 7/1 7/3   Visits/Units:  Used 25 26 27 28 29 30 31 32 33 34 35 36   Authed:  Remaining      KX RE KX kx KX KX KX       Date 7/8 7/10 7/15 7/17           Visits/Units:  Used 37-RE 38 39 40           Authed:  Remaining  KX KX KX KX               Manuals 7/17 7/15 7/10 7/8 7/3    40 39 38 37 36    KX KX KX KX KX   IASTM to left distal ITB                Neuro Re-Ed        LSVT Floor to ceiling 2x5 10s 10x10s 10x10s  2x5 5s need for rest break   LSVT step forward  10x B CS 10x B CS 5x B CS 10x B CS   LSVT sideways step  10x B CS 10x B CS  3x to left then DC due to dizziness   LSVT forward rock/reach  10x B CS/CG 10x B CS/CG     LSVT back step  3x L only then DC due to dizziness 5x B CS/CG and slight dizziness     Turn in place 180        Side step at rail        SLS hip abd        SLS hip flex        CS flex then return to neutral with feet shoulder width apart CS by PT  3x5 - provoked imbalance/dizziness       Ambulation then approach chair into stand to sit [place] around 4 cones turn around then sit in chair  5x w/ close supervision       TUG        Romberg EC        Ther Ex        Education Reviewed results of neurologist follow up and adjustments ot POC.       HEP        6 MWT    performed    5xSTS    performed    DGI    performed    Hook ITB str        Seated heel slides     20x   Seated march    20x 20x   Seated heel/toe raises        Seated CS SNAGS rot        B Heel raise        LAQ     2x10 B

## 2025-07-21 ENCOUNTER — APPOINTMENT (OUTPATIENT)
Dept: PHYSICAL THERAPY | Facility: CLINIC | Age: 75
End: 2025-07-21
Payer: MEDICARE

## 2025-07-22 ENCOUNTER — APPOINTMENT (OUTPATIENT)
Dept: PHYSICAL THERAPY | Facility: CLINIC | Age: 75
End: 2025-07-22
Payer: MEDICARE

## 2025-07-25 ENCOUNTER — OFFICE VISIT (OUTPATIENT)
Dept: PHYSICAL THERAPY | Facility: CLINIC | Age: 75
End: 2025-07-25
Payer: MEDICARE

## 2025-07-25 DIAGNOSIS — R26.89 IMBALANCE: ICD-10-CM

## 2025-07-25 DIAGNOSIS — G62.9 NEUROPATHY: ICD-10-CM

## 2025-07-25 DIAGNOSIS — R26.9 GAIT DIFFICULTY: Primary | ICD-10-CM

## 2025-07-25 PROCEDURE — 97110 THERAPEUTIC EXERCISES: CPT | Performed by: PHYSICAL THERAPIST

## 2025-07-25 PROCEDURE — 97112 NEUROMUSCULAR REEDUCATION: CPT | Performed by: PHYSICAL THERAPIST

## 2025-07-25 NOTE — PROGRESS NOTES
Daily Note     Today's date: 2025  Patient name: Sahara Patel  : 1950  MRN: [id number]  Referring provider: Rubi Bill MD  Dx:   Encounter Diagnosis     ICD-10-CM    1. Gait difficulty  R26.9       2. Imbalance  R26.89       3. Neuropathy  G62.9                      Subjective: Sahara Patel reports her left knee has been bothering her more again which she contributes to how she is walking.  She is scheduled to see a new neurosurgeon next week and have lumbar puncture the following week.       Objective: See treatment diary below      Assessment: Tolerated treatment well. Patient with left lateral patellar tendon irritability.  She had pain initially with fwd step up that was reduced with improved mechanics of knee tracking and glut recruitment.  She also continues with need for verbal cues to improve upright posture, reduce reliance on creeping along furniture, improving step length to reduce risk of falls.       Plan: Continue per plan of care.      Precautions: Imbalance/Falls Risk    Access Code: [email or web address]       Insurance:  A/CMS Eval/ Re-eval Auth #/ Referral # Total units  Start date  Expiration date KX Visit limitation?  PT only or  PT+OT? Co-Insurance   CMS 24 NA    No Prior 47 visits in   100% after deduct    12/5/24            1/9/25            2/10/25            3/10/25            5/8/25            6/10/25            7/8/25             POC Start Date POC Expiration Date Signed POC?   24 Yes   24 Yes   1/9/25 3/6/25 Pend   2/10/25 4/7/25 Pend   3/10/25 5/5/25 Pend   6/10/25 7/22/25 Pend   25 Pend      Date 11/4 12/5 12/10 12/13 12/18 12/19 12/26 12/30 1/2 1/9 1/16 1/23   Visits/Units:  Used 1 2 3 4-KX 5-KX 6-KX 7-KX 8-KX 9 10 11 12   Authed:  Remaining   RE        RE         Date  2/3 2/10 2/13 2/20 2/27 3/6 3/10 3/17 5/8 5/13   Visits/Units:  Used 13 14 15 16 17 18 19 20 21 22 23 24   Authed:  Remaining     RE     RE  RE  "       Date 5/19 5/23 5/29 6/3 6/5 6/10 6/12 6/17 6/19 6/26 7/1 7/3   Visits/Units:  Used 25 26 27 28 29 30 31 32 33 34 35 36   Authed:  Remaining      KX RE KX kx KX KX KX       Date 7/8 7/10 7/15 7/17 7/25          Visits/Units:  Used 37-RE 38 39 40 41          Authed:  Remaining  KX KX KX KX KX              Manuals 7/25 7/17 7/15 7/10 7/8    41 40 39 38 37    KX KX KX KX KX   IASTM to left distal ITB        TFM  Left lateral patellar tendon by PT       Neuro Re-Ed        LSVT Floor to ceiling  2x5 10s 10x10s 10x10s    LSVT step forward   10x B CS 10x B CS 5x B CS   LSVT sideways step   10x B CS 10x B CS    LSVT forward rock/reach   10x B CS/CG 10x B CS/CG    LSVT back step   3x L only then DC due to dizziness 5x B CS/CG and slight dizziness    Turn in place 180        Side step at rail        SLS hip abd        SLS hip flex        CS flex then return to neutral with feet shoulder width apart  CS by PT  3x5 - provoked imbalance/dizziness      Ambulation then approach chair into stand to sit  Slalom around 4 cones turn around then sit in chair  5x w/ close supervision      Fwd step up 4\" 15x B       Mini squats 2x10       Ther Ex        Education  Reviewed results of neurologist follow up and adjustments ot POC.      HEP        6 MWT     performed   5xSTS     performed   DGI     performed   Hook ITB str        Stand ham curl 20x B       Seated march 20x B    20x   Seated heel/toe raises        Seated CS SNAGS rot        Seated band abd BTB 2x10       LAQ 20x B                                        "

## 2025-07-29 ENCOUNTER — APPOINTMENT (OUTPATIENT)
Dept: PHYSICAL THERAPY | Facility: CLINIC | Age: 75
End: 2025-07-29
Payer: MEDICARE

## 2025-07-31 ENCOUNTER — OFFICE VISIT (OUTPATIENT)
Dept: PHYSICAL THERAPY | Facility: CLINIC | Age: 75
End: 2025-07-31
Payer: MEDICARE

## 2025-07-31 DIAGNOSIS — G62.9 NEUROPATHY: ICD-10-CM

## 2025-07-31 DIAGNOSIS — R26.9 GAIT DIFFICULTY: Primary | ICD-10-CM

## 2025-07-31 DIAGNOSIS — R26.89 IMBALANCE: ICD-10-CM

## 2025-07-31 PROCEDURE — 97110 THERAPEUTIC EXERCISES: CPT | Performed by: PHYSICAL THERAPIST

## 2025-07-31 PROCEDURE — 97112 NEUROMUSCULAR REEDUCATION: CPT | Performed by: PHYSICAL THERAPIST
